# Patient Record
Sex: MALE | URBAN - METROPOLITAN AREA
[De-identification: names, ages, dates, MRNs, and addresses within clinical notes are randomized per-mention and may not be internally consistent; named-entity substitution may affect disease eponyms.]

---

## 2019-08-27 ENCOUNTER — APPOINTMENT (RX ONLY)
Dept: URBAN - METROPOLITAN AREA CLINIC 51 | Facility: CLINIC | Age: 59
Setting detail: DERMATOLOGY
End: 2019-08-27

## 2019-08-27 ENCOUNTER — APPOINTMENT (RX ONLY)
Dept: URBAN - METROPOLITAN AREA CLINIC 49 | Facility: CLINIC | Age: 59
Setting detail: DERMATOLOGY
End: 2019-08-27

## 2019-08-27 DIAGNOSIS — L72.0 EPIDERMAL CYST: ICD-10-CM

## 2019-08-27 DIAGNOSIS — Z02.9 ENCOUNTER FOR ADMINISTRATIVE EXAMINATIONS, UNSPECIFIED: ICD-10-CM

## 2019-08-27 DIAGNOSIS — L91.8 OTHER HYPERTROPHIC DISORDERS OF THE SKIN: ICD-10-CM

## 2019-08-27 PROBLEM — H91.90 UNSPECIFIED HEARING LOSS, UNSPECIFIED EAR: Status: ACTIVE | Noted: 2019-08-27

## 2019-08-27 PROCEDURE — ? SKIN TAG REMOVAL

## 2019-08-27 PROCEDURE — 11200 RMVL SKIN TAGS UP TO&INC 15: CPT

## 2019-08-27 PROCEDURE — 99202 OFFICE O/P NEW SF 15 MIN: CPT | Mod: 25

## 2019-08-27 PROCEDURE — ? COUNSELING

## 2019-08-27 ASSESSMENT — LOCATION DETAILED DESCRIPTION DERM
LOCATION DETAILED: RIGHT LATERAL UPPER BACK
LOCATION DETAILED: RIGHT INFERIOR MEDIAL MIDBACK
LOCATION DETAILED: RIGHT AXILLARY VAULT
LOCATION DETAILED: LEFT SUPERIOR UPPER BACK
LOCATION DETAILED: LEFT AXILLARY VAULT

## 2019-08-27 ASSESSMENT — LOCATION ZONE DERM
LOCATION ZONE: AXILLAE
LOCATION ZONE: TRUNK

## 2019-08-27 ASSESSMENT — LOCATION SIMPLE DESCRIPTION DERM
LOCATION SIMPLE: LEFT UPPER BACK
LOCATION SIMPLE: RIGHT LOWER BACK
LOCATION SIMPLE: RIGHT AXILLARY VAULT
LOCATION SIMPLE: LEFT AXILLARY VAULT
LOCATION SIMPLE: RIGHT UPPER BACK

## 2019-08-27 NOTE — PROCEDURE: SKIN TAG REMOVAL
Anesthesia Volume In Cc: 3
Include Z78.9 (Other Specified Conditions Influencing Health Status) As An Associated Diagnosis?: No
Anesthesia Type: 1% lidocaine with epinephrine
Medical Necessity Clause: This procedure was medically necessary because the lesions that were treated were: irritated.
Medical Necessity Information: It is in your best interest to select a reason for this procedure from the list below. All of these items fulfill various CMS LCD requirements except the new and changing color options.
Consent: Written consent obtained and the risks of skin tag removal was reviewed with the patient including but not limited to bleeding, pigmentary change, infection, pain, and remote possibility of scarring.
Add Associated Diagnoses If Applicable When Selecting Medical Necessity: Yes
Detail Level: Detailed

## 2019-11-18 ENCOUNTER — EVALUATION (OUTPATIENT)
Dept: PHYSICAL THERAPY | Facility: CLINIC | Age: 59
End: 2019-11-18
Payer: COMMERCIAL

## 2019-11-18 DIAGNOSIS — M76.822 POSTERIOR TIBIAL TENDINITIS OF LEFT LOWER EXTREMITY: Primary | ICD-10-CM

## 2019-11-18 PROCEDURE — 97162 PT EVAL MOD COMPLEX 30 MIN: CPT | Performed by: PHYSICAL THERAPIST

## 2019-11-18 NOTE — LETTER
2019    [address]  [address]    Patient: Penelope Ferrara   YOB: 1960   Date of Visit: 2019     Encounter Diagnosis     ICD-10-CM    1  Posterior tibial tendinitis of left lower extremity S93 894        Dear Dr Colton Cardenas: Thank you for your recent referral of Penelope Ferrara  Please review the attached evaluation summary from Davon's recent visit  Please verify that you agree with the plan of care by signing the attached order  If you have any questions or concerns, please do not hesitate to call  I sincerely appreciate the opportunity to share in the care of one of your patients and hope to have another opportunity to work with you in the near future  Sincerely,    Oleksandr Liu, PT      Referring Provider:      I certify that I have read the below Plan of Care and certify the need for these services furnished under this plan of treatment while under my care  EDITH [address]  [address]  [facility]          PT Evaluation     Today's date: 2019  Patient name: Penelope Ferrara  : 1960  MRN: [id number]  Referring provider: Anum Wallace DPM  Dx:   Encounter Diagnosis     ICD-10-CM    1  Posterior tibial tendinitis of left lower extremity M97 591                   Assessment  Assessment details: Patient is a 61 y o  male who presents with the above listed impairments  Patient would benefit from skilled PT services to address these impairments and to maximize function  Thank you for the referral     Impairments: abnormal or restricted ROM, abnormal movement, activity intolerance, impaired physical strength, pain with function and weight-bearing intolerance  Understanding of Dx/Px/POC: good   Prognosis: good    Goals  Impairment Goals  - Decrease pain by 50% in 2 weeks  - Increase left flexibility by 50% in 2 weeks    - Increase left lower extremity strength globally to 5/5 in 4 weeks  Functional Goals  - Return to Prior Level of Function in 4 weeks  - Patient will be independent with HEP in 4 weeks    Plan  Patient would benefit from: skilled PT  Planned modality interventions: cryotherapy  Planned therapy interventions: joint mobilization, manual therapy, neuromuscular re-education, patient education, strengthening, stretching, therapeutic activities, therapeutic exercise, home exercise program, functional ROM exercises, Sequeira taping and postural training  Frequency: 2x week (2-3x week)  Duration in weeks: 4  Treatment plan discussed with: patient, PTA and referring physician        Subjective Evaluation    History of Present Illness  Mechanism of injury: Patient reports for the last 3 years he started to have L foot pain  He states his pain has been off and on  He notes he did see a podiatrist who prescribed a medrol dose pack  He states his foot is starting to feel better  He has not been wearing a shoe, but rather a brace on his ankle  He notes his pain is worse when WB and with walking  He denies any tingling or numbness  Occupation: Unemployed  PLOF: Independent   Pt's Goals:  Pain  Current pain ratin  At best pain ratin  At worst pain ratin  Location: L foot  Quality: sharp    Patient Goals  Patient goals for therapy: decreased pain          Objective     Tenderness   Left Ankle/Foot   Tenderness in the posterior tibial tendon  Additional Tenderness Details  Very mild      Neurological Testing     Sensation     Ankle/Foot   Left Ankle/Foot   Intact: light touch    Right Ankle/Foot   Intact: light touch     Active Range of Motion   Left Ankle/Foot   Normal active range of motion  Dorsiflexion (ke): 10 degrees   Plantar flexion: WFL  Inversion: WFL  Eversion: WFL    Right Ankle/Foot   Dorsiflexion (ke): 18 degrees   Plantar flexion: WFL  Inversion: WFL  Eversion: WFL    Passive Range of Motion   Left Ankle/Foot  Normal passive range of motion    Right Ankle/Foot  Normal passive range of motion    Joint Play   Left Ankle/Foot  Hypomobile in the talocrural joint  Strength/Myotome Testing     Right Ankle/Foot   Normal strength    Additional Strength Details  5/5 strength noted grossly L ankle  Tests     Additional Tests Details  Mild to moderate calcaneal eversion noted upon WB   OTC orthotics recommended  General Comments: Ankle/Foot Comments   Neurovascular intact  Gait is WNL  Flowsheet Rows      Most Recent Value   PT/OT G-Codes   Current Score  69   Projected Score  79   FOTO information reviewed  Yes         Diagnosis: L post  Tib   Tendonitis    Precautions: -   Manuals 11/18       IASTM        TCJ mobs                        Exercise Diary        Bike                gastroc S        soleous S                Eccentric heel raises                                                                                                                                Modalities             CP PRN

## 2019-11-18 NOTE — PROGRESS NOTES
PT Evaluation     Today's date: 2019  Patient name: Perla Smith  : 1960  MRN: [id number]  Referring provider: Angella Aiken DPM  Dx:   Encounter Diagnosis     ICD-10-CM    1  Posterior tibial tendinitis of left lower extremity S28 209                   Assessment  Assessment details: Patient is a 61 y o  male who presents with the above listed impairments  Patient would benefit from skilled PT services to address these impairments and to maximize function  Thank you for the referral     Impairments: abnormal or restricted ROM, abnormal movement, activity intolerance, impaired physical strength, pain with function and weight-bearing intolerance  Understanding of Dx/Px/POC: good   Prognosis: good    Goals  Impairment Goals  - Decrease pain by 50% in 2 weeks  - Increase left flexibility by 50% in 2 weeks  - Increase left lower extremity strength globally to 5/5 in 4 weeks  Functional Goals  - Return to Prior Level of Function in 4 weeks  - Patient will be independent with HEP in 4 weeks    Plan  Patient would benefit from: skilled PT  Planned modality interventions: cryotherapy  Planned therapy interventions: joint mobilization, manual therapy, neuromuscular re-education, patient education, strengthening, stretching, therapeutic activities, therapeutic exercise, home exercise program, functional ROM exercises, Sequeira taping and postural training  Frequency: 2x week (2-3x week)  Duration in weeks: 4  Treatment plan discussed with: patient, PTA and referring physician        Subjective Evaluation    History of Present Illness  Mechanism of injury: Patient reports for the last 3 years he started to have L foot pain  He states his pain has been off and on  He notes he did see a podiatrist who prescribed a medrol dose pack  He states his foot is starting to feel better  He has not been wearing a shoe, but rather a brace on his ankle    He notes his pain is worse when WB and with walking  He denies any tingling or numbness  Occupation: Unemployed  PLOF: Independent   Pt's Goals:  Pain  Current pain ratin  At best pain ratin  At worst pain ratin  Location: L foot  Quality: sharp    Patient Goals  Patient goals for therapy: decreased pain          Objective     Tenderness   Left Ankle/Foot   Tenderness in the posterior tibial tendon  Additional Tenderness Details  Very mild  Neurological Testing     Sensation     Ankle/Foot   Left Ankle/Foot   Intact: light touch    Right Ankle/Foot   Intact: light touch     Active Range of Motion   Left Ankle/Foot   Normal active range of motion  Dorsiflexion (ke): 10 degrees   Plantar flexion: WFL  Inversion: WFL  Eversion: WFL    Right Ankle/Foot   Dorsiflexion (ke): 18 degrees   Plantar flexion: WFL  Inversion: WFL  Eversion: WFL    Passive Range of Motion   Left Ankle/Foot  Normal passive range of motion    Right Ankle/Foot  Normal passive range of motion    Joint Play   Left Ankle/Foot  Hypomobile in the talocrural joint  Strength/Myotome Testing     Right Ankle/Foot   Normal strength    Additional Strength Details  5/5 strength noted grossly L ankle  Tests     Additional Tests Details  Mild to moderate calcaneal eversion noted upon WB   OTC orthotics recommended  General Comments: Ankle/Foot Comments   Neurovascular intact  Gait is WNL  Flowsheet Rows      Most Recent Value   PT/OT G-Codes   Current Score  69   Projected Score  79   FOTO information reviewed  Yes         Diagnosis: L post  Tib   Tendonitis    Precautions: -   Manuals        IASTM        TCJ mobs                        Exercise Diary        Bike                gastroc S        soleous S                Eccentric heel raises                                                                                                                                Modalities             CP PRN

## 2019-11-20 ENCOUNTER — OFFICE VISIT (OUTPATIENT)
Dept: PHYSICAL THERAPY | Facility: CLINIC | Age: 59
End: 2019-11-20
Payer: COMMERCIAL

## 2019-11-20 DIAGNOSIS — M76.822 POSTERIOR TIBIAL TENDINITIS OF LEFT LOWER EXTREMITY: Primary | ICD-10-CM

## 2019-11-20 PROCEDURE — 97140 MANUAL THERAPY 1/> REGIONS: CPT

## 2019-11-20 PROCEDURE — 97112 NEUROMUSCULAR REEDUCATION: CPT

## 2019-11-25 ENCOUNTER — APPOINTMENT (OUTPATIENT)
Dept: PHYSICAL THERAPY | Facility: CLINIC | Age: 59
End: 2019-11-25
Payer: COMMERCIAL

## 2020-12-02 ENCOUNTER — TELEPHONE (OUTPATIENT)
Dept: GASTROENTEROLOGY | Facility: CLINIC | Age: 60
End: 2020-12-02

## 2020-12-03 ENCOUNTER — TELEPHONE (OUTPATIENT)
Dept: GASTROENTEROLOGY | Facility: AMBULARY SURGERY CENTER | Age: 60
End: 2020-12-03

## 2020-12-08 ENCOUNTER — TRANSCRIBE ORDERS (OUTPATIENT)
Dept: LAB | Facility: CLINIC | Age: 60
End: 2020-12-08

## 2020-12-08 ENCOUNTER — OFFICE VISIT (OUTPATIENT)
Dept: FAMILY MEDICINE CLINIC | Facility: CLINIC | Age: 60
End: 2020-12-08
Payer: COMMERCIAL

## 2020-12-08 VITALS
OXYGEN SATURATION: 97 % | DIASTOLIC BLOOD PRESSURE: 80 MMHG | SYSTOLIC BLOOD PRESSURE: 136 MMHG | WEIGHT: 224.6 LBS | HEART RATE: 82 BPM | HEIGHT: 69 IN | BODY MASS INDEX: 33.27 KG/M2 | RESPIRATION RATE: 18 BRPM

## 2020-12-08 DIAGNOSIS — R21 RASH ON SCROTUM: ICD-10-CM

## 2020-12-08 DIAGNOSIS — Z11.59 NEED FOR HEPATITIS C SCREENING TEST: ICD-10-CM

## 2020-12-08 DIAGNOSIS — Z12.5 SCREENING FOR PROSTATE CANCER: ICD-10-CM

## 2020-12-08 DIAGNOSIS — R35.1 NOCTURIA: ICD-10-CM

## 2020-12-08 DIAGNOSIS — Z13.1 SCREENING FOR DIABETES MELLITUS: ICD-10-CM

## 2020-12-08 DIAGNOSIS — Z13.6 SCREENING FOR CARDIOVASCULAR CONDITION: ICD-10-CM

## 2020-12-08 DIAGNOSIS — Z00.00 ANNUAL PHYSICAL EXAM: Primary | ICD-10-CM

## 2020-12-08 DIAGNOSIS — Z12.11 SCREENING FOR COLON CANCER: ICD-10-CM

## 2020-12-08 PROCEDURE — 99386 PREV VISIT NEW AGE 40-64: CPT | Performed by: NURSE PRACTITIONER

## 2020-12-08 PROCEDURE — 3725F SCREEN DEPRESSION PERFORMED: CPT | Performed by: NURSE PRACTITIONER

## 2020-12-08 PROCEDURE — 3008F BODY MASS INDEX DOCD: CPT | Performed by: NURSE PRACTITIONER

## 2020-12-09 ENCOUNTER — LAB (OUTPATIENT)
Dept: LAB | Facility: CLINIC | Age: 60
End: 2020-12-09
Payer: COMMERCIAL

## 2020-12-09 DIAGNOSIS — Z12.5 SCREENING FOR PROSTATE CANCER: ICD-10-CM

## 2020-12-09 DIAGNOSIS — Z11.59 NEED FOR HEPATITIS C SCREENING TEST: ICD-10-CM

## 2020-12-09 DIAGNOSIS — R35.1 NOCTURIA: ICD-10-CM

## 2020-12-09 DIAGNOSIS — Z13.6 SCREENING FOR CARDIOVASCULAR CONDITION: ICD-10-CM

## 2020-12-09 DIAGNOSIS — Z13.1 SCREENING FOR DIABETES MELLITUS: ICD-10-CM

## 2020-12-09 DIAGNOSIS — Z00.00 ANNUAL PHYSICAL EXAM: ICD-10-CM

## 2020-12-09 LAB
ALBUMIN SERPL BCP-MCNC: 4.1 G/DL (ref 3.5–5)
ALP SERPL-CCNC: 75 U/L (ref 46–116)
ALT SERPL W P-5'-P-CCNC: 34 U/L (ref 12–78)
ANION GAP SERPL CALCULATED.3IONS-SCNC: 3 MMOL/L (ref 4–13)
AST SERPL W P-5'-P-CCNC: 15 U/L (ref 5–45)
BASOPHILS # BLD AUTO: 0.08 THOUSANDS/ΜL (ref 0–0.1)
BASOPHILS NFR BLD AUTO: 1 % (ref 0–1)
BILIRUB SERPL-MCNC: 0.39 MG/DL (ref 0.2–1)
BUN SERPL-MCNC: 16 MG/DL (ref 5–25)
CALCIUM SERPL-MCNC: 9.6 MG/DL (ref 8.3–10.1)
CHLORIDE SERPL-SCNC: 107 MMOL/L (ref 100–108)
CHOLEST SERPL-MCNC: 235 MG/DL (ref 50–200)
CO2 SERPL-SCNC: 29 MMOL/L (ref 21–32)
CREAT SERPL-MCNC: 0.94 MG/DL (ref 0.6–1.3)
EOSINOPHIL # BLD AUTO: 0.52 THOUSAND/ΜL (ref 0–0.61)
EOSINOPHIL NFR BLD AUTO: 7 % (ref 0–6)
ERYTHROCYTE [DISTWIDTH] IN BLOOD BY AUTOMATED COUNT: 12 % (ref 11.6–15.1)
GFR SERPL CREATININE-BSD FRML MDRD: 88 ML/MIN/1.73SQ M
GLUCOSE P FAST SERPL-MCNC: 95 MG/DL (ref 65–99)
HCT VFR BLD AUTO: 42.5 % (ref 36.5–49.3)
HCV AB SER QL: NORMAL
HDLC SERPL-MCNC: 48 MG/DL
HGB BLD-MCNC: 13.6 G/DL (ref 12–17)
IMM GRANULOCYTES # BLD AUTO: 0.03 THOUSAND/UL (ref 0–0.2)
IMM GRANULOCYTES NFR BLD AUTO: 0 % (ref 0–2)
LDLC SERPL CALC-MCNC: 165 MG/DL (ref 0–100)
LYMPHOCYTES # BLD AUTO: 2.58 THOUSANDS/ΜL (ref 0.6–4.47)
LYMPHOCYTES NFR BLD AUTO: 35 % (ref 14–44)
MCH RBC QN AUTO: 28.9 PG (ref 26.8–34.3)
MCHC RBC AUTO-ENTMCNC: 32 G/DL (ref 31.4–37.4)
MCV RBC AUTO: 90 FL (ref 82–98)
MONOCYTES # BLD AUTO: 0.52 THOUSAND/ΜL (ref 0.17–1.22)
MONOCYTES NFR BLD AUTO: 7 % (ref 4–12)
NEUTROPHILS # BLD AUTO: 3.58 THOUSANDS/ΜL (ref 1.85–7.62)
NEUTS SEG NFR BLD AUTO: 50 % (ref 43–75)
NRBC BLD AUTO-RTO: 0 /100 WBCS
PLATELET # BLD AUTO: 208 THOUSANDS/UL (ref 149–390)
PMV BLD AUTO: 10.7 FL (ref 8.9–12.7)
POTASSIUM SERPL-SCNC: 4.7 MMOL/L (ref 3.5–5.3)
PROT SERPL-MCNC: 8.2 G/DL (ref 6.4–8.2)
PSA SERPL-MCNC: 4.5 NG/ML (ref 0–4)
RBC # BLD AUTO: 4.7 MILLION/UL (ref 3.88–5.62)
SODIUM SERPL-SCNC: 139 MMOL/L (ref 136–145)
TRIGL SERPL-MCNC: 112 MG/DL
TSH SERPL DL<=0.05 MIU/L-ACNC: 2.73 UIU/ML (ref 0.36–3.74)
WBC # BLD AUTO: 7.31 THOUSAND/UL (ref 4.31–10.16)

## 2020-12-09 PROCEDURE — 86803 HEPATITIS C AB TEST: CPT

## 2020-12-09 PROCEDURE — 84443 ASSAY THYROID STIM HORMONE: CPT

## 2020-12-09 PROCEDURE — G0103 PSA SCREENING: HCPCS

## 2020-12-09 PROCEDURE — 80061 LIPID PANEL: CPT

## 2020-12-09 PROCEDURE — 80053 COMPREHEN METABOLIC PANEL: CPT

## 2020-12-09 PROCEDURE — 85025 COMPLETE CBC W/AUTO DIFF WBC: CPT

## 2020-12-09 PROCEDURE — 36415 COLL VENOUS BLD VENIPUNCTURE: CPT

## 2020-12-22 ENCOUNTER — TELEPHONE (OUTPATIENT)
Dept: FAMILY MEDICINE CLINIC | Facility: CLINIC | Age: 60
End: 2020-12-22

## 2020-12-22 ENCOUNTER — TELEMEDICINE (OUTPATIENT)
Dept: FAMILY MEDICINE CLINIC | Facility: CLINIC | Age: 60
End: 2020-12-22
Payer: COMMERCIAL

## 2020-12-22 DIAGNOSIS — R97.20 ELEVATED PSA: ICD-10-CM

## 2020-12-22 DIAGNOSIS — E66.9 OBESITY (BMI 30-39.9): ICD-10-CM

## 2020-12-22 DIAGNOSIS — E78.5 HYPERLIPIDEMIA, UNSPECIFIED HYPERLIPIDEMIA TYPE: Primary | ICD-10-CM

## 2020-12-22 PROCEDURE — 1036F TOBACCO NON-USER: CPT | Performed by: NURSE PRACTITIONER

## 2020-12-22 PROCEDURE — 99213 OFFICE O/P EST LOW 20 MIN: CPT | Performed by: NURSE PRACTITIONER

## 2022-08-21 ENCOUNTER — HOSPITAL ENCOUNTER (EMERGENCY)
Facility: HOSPITAL | Age: 62
Discharge: HOME | End: 2022-08-21
Attending: EMERGENCY MEDICINE
Payer: COMMERCIAL

## 2022-08-21 VITALS
WEIGHT: 275.57 LBS | DIASTOLIC BLOOD PRESSURE: 70 MMHG | RESPIRATION RATE: 28 BRPM | OXYGEN SATURATION: 95 % | HEART RATE: 62 BPM | SYSTOLIC BLOOD PRESSURE: 118 MMHG | BODY MASS INDEX: 40.7 KG/M2 | TEMPERATURE: 97.9 F

## 2022-08-21 DIAGNOSIS — R33.9 URINARY RETENTION: ICD-10-CM

## 2022-08-21 DIAGNOSIS — T83.9XXA PROBLEM WITH FOLEY CATHETER, INITIAL ENCOUNTER (HCC): Primary | ICD-10-CM

## 2022-08-21 LAB
AMORPH URATE CRY URNS QL MICRO: ABNORMAL /HPF
ANION GAP SERPL CALCULATED.3IONS-SCNC: 12 MMOL/L (ref 4–13)
BACTERIA UR QL AUTO: ABNORMAL /HPF
BASOPHILS # BLD AUTO: 0.05 THOUSANDS/ΜL (ref 0–0.1)
BASOPHILS NFR BLD AUTO: 1 % (ref 0–1)
BILIRUB UR QL STRIP: NEGATIVE
BUN SERPL-MCNC: 19 MG/DL (ref 5–25)
CALCIUM SERPL-MCNC: 9.1 MG/DL (ref 8.3–10.1)
CHLORIDE SERPL-SCNC: 99 MMOL/L (ref 96–108)
CLARITY UR: ABNORMAL
CO2 SERPL-SCNC: 23 MMOL/L (ref 21–32)
COLOR UR: ABNORMAL
CREAT SERPL-MCNC: 1.29 MG/DL (ref 0.6–1.3)
EOSINOPHIL # BLD AUTO: 0.1 THOUSAND/ΜL (ref 0–0.61)
EOSINOPHIL NFR BLD AUTO: 2 % (ref 0–6)
ERYTHROCYTE [DISTWIDTH] IN BLOOD BY AUTOMATED COUNT: 13.2 % (ref 11.6–15.1)
GFR SERPL CREATININE-BSD FRML MDRD: 59 ML/MIN/1.73SQ M
GLUCOSE SERPL-MCNC: 100 MG/DL (ref 65–140)
GLUCOSE UR STRIP-MCNC: NEGATIVE MG/DL
HCT VFR BLD AUTO: 39.7 % (ref 36.5–49.3)
HGB BLD-MCNC: 13.1 G/DL (ref 12–17)
HGB UR QL STRIP.AUTO: ABNORMAL
IMM GRANULOCYTES # BLD AUTO: 0.04 THOUSAND/UL (ref 0–0.2)
IMM GRANULOCYTES NFR BLD AUTO: 1 % (ref 0–2)
KETONES UR STRIP-MCNC: NEGATIVE MG/DL
LEUKOCYTE ESTERASE UR QL STRIP: ABNORMAL
LYMPHOCYTES # BLD AUTO: 1.06 THOUSANDS/ΜL (ref 0.6–4.47)
LYMPHOCYTES NFR BLD AUTO: 17 % (ref 14–44)
MCH RBC QN AUTO: 28.5 PG (ref 26.8–34.3)
MCHC RBC AUTO-ENTMCNC: 33 G/DL (ref 31.4–37.4)
MCV RBC AUTO: 86 FL (ref 82–98)
MONOCYTES # BLD AUTO: 0.44 THOUSAND/ΜL (ref 0.17–1.22)
MONOCYTES NFR BLD AUTO: 7 % (ref 4–12)
MUCOUS THREADS UR QL AUTO: ABNORMAL
NEUTROPHILS # BLD AUTO: 4.72 THOUSANDS/ΜL (ref 1.85–7.62)
NEUTS SEG NFR BLD AUTO: 72 % (ref 43–75)
NITRITE UR QL STRIP: NEGATIVE
NON-SQ EPI CELLS URNS QL MICRO: ABNORMAL /HPF
NRBC BLD AUTO-RTO: 0 /100 WBCS
OTHER STN SPEC: ABNORMAL
PH UR STRIP.AUTO: 6 [PH]
PLATELET # BLD AUTO: 195 THOUSANDS/UL (ref 149–390)
PMV BLD AUTO: 10.5 FL (ref 8.9–12.7)
POTASSIUM SERPL-SCNC: 4.2 MMOL/L (ref 3.5–5.3)
PROT UR STRIP-MCNC: ABNORMAL MG/DL
RBC # BLD AUTO: 4.6 MILLION/UL (ref 3.88–5.62)
RBC #/AREA URNS AUTO: ABNORMAL /HPF
SODIUM SERPL-SCNC: 134 MMOL/L (ref 135–147)
SP GR UR STRIP.AUTO: >=1.03 (ref 1–1.03)
UROBILINOGEN UR QL STRIP.AUTO: 0.2 E.U./DL
WBC # BLD AUTO: 6.41 THOUSAND/UL (ref 4.31–10.16)
WBC #/AREA URNS AUTO: ABNORMAL /HPF

## 2022-08-21 PROCEDURE — 99283 EMERGENCY DEPT VISIT LOW MDM: CPT

## 2022-08-21 PROCEDURE — 85025 COMPLETE CBC W/AUTO DIFF WBC: CPT | Performed by: EMERGENCY MEDICINE

## 2022-08-21 PROCEDURE — 81001 URINALYSIS AUTO W/SCOPE: CPT | Performed by: EMERGENCY MEDICINE

## 2022-08-21 PROCEDURE — 96374 THER/PROPH/DIAG INJ IV PUSH: CPT

## 2022-08-21 PROCEDURE — 36415 COLL VENOUS BLD VENIPUNCTURE: CPT | Performed by: EMERGENCY MEDICINE

## 2022-08-21 PROCEDURE — 99285 EMERGENCY DEPT VISIT HI MDM: CPT | Performed by: EMERGENCY MEDICINE

## 2022-08-21 PROCEDURE — 80048 BASIC METABOLIC PNL TOTAL CA: CPT | Performed by: EMERGENCY MEDICINE

## 2022-08-21 RX ORDER — WATER 1000 ML/1000ML
INJECTION, SOLUTION INTRAVENOUS
Status: DISCONTINUED
Start: 2022-08-21 | End: 2022-08-21 | Stop reason: HOSPADM

## 2022-08-21 RX ORDER — TAMSULOSIN HYDROCHLORIDE 0.4 MG/1
0.4 CAPSULE ORAL
COMMUNITY
End: 2022-09-07 | Stop reason: SDUPTHER

## 2022-08-21 RX ORDER — HYDROCODONE BITARTRATE AND ACETAMINOPHEN 5; 325 MG/1; MG/1
1 TABLET ORAL EVERY 6 HOURS PRN
Qty: 8 TABLET | Refills: 0 | Status: SHIPPED | OUTPATIENT
Start: 2022-08-21 | End: 2022-08-23

## 2022-08-21 RX ORDER — HYDROMORPHONE HCL/PF 1 MG/ML
1 SYRINGE (ML) INJECTION ONCE
Status: COMPLETED | OUTPATIENT
Start: 2022-08-21 | End: 2022-08-21

## 2022-08-21 RX ADMIN — HYDROMORPHONE HYDROCHLORIDE 1 MG: 1 INJECTION, SOLUTION INTRAMUSCULAR; INTRAVENOUS; SUBCUTANEOUS at 17:00

## 2022-08-21 NOTE — ED PROVIDER NOTES
History  Chief Complaint   Patient presents with    Urinary Catheter Problem     Catheter placed on  four days ago in [place], given referral here for Dr Gisela Oneill, started having prob with catheter recently urinating around catheter     27-year-old male presents with the urine leaking around his Daniels catheter and severe supra pubic abdominal discomfort  He has an indwelling Daniels catheter placed a few days ago at an outside hospital for urine retention and hematuria  Says since 5:00 a m  This morning he has not been draining any urine into the Daniels bag  Denies any nausea vomiting dysuria urgency frequency no fevers or chills no other symptoms at this time  Due to see Dr Gisela Oneill the urologist in a couple of days      History provided by:  Patient   used: No        Prior to Admission Medications   Prescriptions Last Dose Informant Patient Reported? Taking? Sulfamethoxazole-Trimethoprim (BACTRIM DS PO) 8/21/2022 at Unknown time  Yes Yes   Sig: Take by mouth 2 (two) times a day   tamsulosin (FLOMAX) 0 4 mg 8/21/2022 at Unknown time  Yes Yes   Sig: Take 0 4 mg by mouth daily with dinner      Facility-Administered Medications: None       Past Medical History:   Diagnosis Date    Sleep apnea        Past Surgical History:   Procedure Laterality Date    CYST REMOVAL      UVULECTOMY         Family History   Problem Relation Age of Onset    Dementia Mother     Prostate cancer Father      I have reviewed and agree with the history as documented      E-Cigarette/Vaping    E-Cigarette Use Current Every Day User      E-Cigarette/Vaping Substances    Nicotine No     THC No     CBD No     Flavoring No     Other No     Unknown No      Social History     Tobacco Use    Smoking status: Former Smoker    Smokeless tobacco: Never Used   Vaping Use    Vaping Use: Every day   Substance Use Topics    Alcohol use: Yes     Comment: occ    Drug use: Yes     Types: Marijuana     Comment: occ Review of Systems   Constitutional: Negative  HENT: Negative  Eyes: Negative  Respiratory: Negative  Cardiovascular: Negative  Gastrointestinal: Positive for abdominal pain  Endocrine: Negative  Genitourinary: Positive for difficulty urinating  Musculoskeletal: Negative  Skin: Negative  Allergic/Immunologic: Negative  Neurological: Negative  Hematological: Negative  Psychiatric/Behavioral: Negative  All other systems reviewed and are negative  Physical Exam  Physical Exam  Constitutional:       Appearance: Normal appearance  HENT:      Head: Normocephalic and atraumatic  Nose: Nose normal       Mouth/Throat:      Mouth: Mucous membranes are moist    Eyes:      Extraocular Movements: Extraocular movements intact  Pupils: Pupils are equal, round, and reactive to light  Cardiovascular:      Rate and Rhythm: Normal rate and regular rhythm  Pulmonary:      Effort: Pulmonary effort is normal       Breath sounds: Normal breath sounds  Abdominal:      General: Abdomen is flat  Bowel sounds are normal       Palpations: Abdomen is soft  Tenderness: There is abdominal tenderness  Musculoskeletal:         General: Normal range of motion  Cervical back: Normal range of motion and neck supple  Skin:     General: Skin is warm  Capillary Refill: Capillary refill takes less than 2 seconds  Neurological:      General: No focal deficit present  Mental Status: He is alert and oriented to person, place, and time  Mental status is at baseline  Psychiatric:         Mood and Affect: Mood normal          Thought Content:  Thought content normal          Vital Signs  ED Triage Vitals [08/21/22 1620]   Temperature Pulse Respirations Blood Pressure SpO2   97 9 °F (36 6 °C) 76 (!) 28 144/87 94 %      Temp Source Heart Rate Source Patient Position - Orthostatic VS BP Location FiO2 (%)   Tympanic Monitor Sitting Left arm --      Pain Score       8 Vitals:    08/21/22 1620 08/21/22 1753   BP: 144/87 134/74   Pulse: 76 62   Patient Position - Orthostatic VS: Sitting          Visual Acuity      ED Medications  Medications   sterile water injection **ADS Override Pull** (has no administration in time range)   HYDROmorphone (DILAUDID) injection 1 mg (1 mg Intravenous Given 8/21/22 1700)       Diagnostic Studies  Results Reviewed     Procedure Component Value Units Date/Time    Urine Microscopic [[id number]]  (Abnormal) Collected: 08/21/22 1715    Lab Status: Final result Specimen: Urine, Indwelling Daniels Catheter Updated: 08/21/22 1735     RBC, UA 30-50 /hpf      WBC, UA 4-10 /hpf      Epithelial Cells None Seen /hpf      Bacteria, UA Occasional /hpf      AMORPH URATES Moderate /hpf      OTHER OBSERVATIONS Renal Tubule Epithelial Cells Present     MUCUS THREADS Occasional    UA (URINE) with reflex to Scope [[id number]]  (Abnormal) Collected: 08/21/22 1715    Lab Status: Final result Specimen: Urine, Indwelling Daniels Catheter Updated: 08/21/22 1725     Color, UA Gini     Clarity, UA Slightly Cloudy     Specific Gravity, UA >=1 030     pH, UA 6 0     Leukocytes, UA Small     Nitrite, UA Negative     Protein, UA Trace mg/dl      Glucose, UA Negative mg/dl      Ketones, UA Negative mg/dl      Urobilinogen, UA 0 2 E U /dl      Bilirubin, UA Negative     Occult Blood, UA Large    Basic metabolic panel [[id number]]  (Abnormal) Collected: 08/21/22 1705    Lab Status: Final result Specimen: Blood from Arm, Left Updated: 08/21/22 1722     Sodium 134 mmol/L      Potassium 4 2 mmol/L      Chloride 99 mmol/L      CO2 23 mmol/L      ANION GAP 12 mmol/L      BUN 19 mg/dL      Creatinine 1 29 mg/dL      Glucose 100 mg/dL      Calcium 9 1 mg/dL      eGFR 59 ml/min/1 73sq m     Narrative:      [place] guidelines for Chronic Kidney Disease (CKD):     Stage 1 with normal or high GFR (GFR > 90 mL/min/1 73 square meters)    Stage 2 Mild CKD (GFR = 60-89 mL/min/1 73 square meters)    Stage 3A Moderate CKD (GFR = 45-59 mL/min/1 73 square meters)    Stage 3B Moderate CKD (GFR = 30-44 mL/min/1 73 square meters)    Stage 4 Severe CKD (GFR = 15-29 mL/min/1 73 square meters)    Stage 5 End Stage CKD (GFR <15 mL/min/1 73 square meters)  Note: GFR calculation is accurate only with a steady state creatinine    CBC and differential [[id number]] Collected: 08/21/22 1705    Lab Status: Final result Specimen: Blood from Arm, Left Updated: 08/21/22 1712     WBC 6 41 Thousand/uL      RBC 4 60 Million/uL      Hemoglobin 13 1 g/dL      Hematocrit 39 7 %      MCV 86 fL      MCH 28 5 pg      MCHC 33 0 g/dL      RDW 13 2 %      MPV 10 5 fL      Platelets 279 Thousands/uL      nRBC 0 /100 WBCs      Neutrophils Relative 72 %      Immat GRANS % 1 %      Lymphocytes Relative 17 %      Monocytes Relative 7 %      Eosinophils Relative 2 %      Basophils Relative 1 %      Neutrophils Absolute 4 72 Thousands/µL      Immature Grans Absolute 0 04 Thousand/uL      Lymphocytes Absolute 1 06 Thousands/µL      Monocytes Absolute 0 44 Thousand/µL      Eosinophils Absolute 0 10 Thousand/µL      Basophils Absolute 0 05 Thousands/µL                  No orders to display              Procedures  Procedures         ED Course                                             MDM  Number of Diagnoses or Management Options     Amount and/or Complexity of Data Reviewed  Clinical lab tests: ordered and reviewed  Tests in the medicine section of CPT®: ordered and reviewed    Patient Progress  Patient progress: stable      Disposition  Final diagnoses:   Problem with Daniels catheter, initial encounter [facility])   Urinary retention     Time reflects when diagnosis was documented in both MDM as applicable and the Disposition within this note     Time User Action Codes Description Comment    8/21/2022  5:57 PM Massimo Merida Add [T83  9XXA] Problem with Daniels catheter, initial encounter ([facility] [address] )     8/21/2022  5:57 PM Anepu Sana Frost Add [R33 9] Urinary retention       ED Disposition     ED Disposition   Discharge    Condition   Stable    Date/Time   Sun Aug 21, 2022  5:57 PM    [address] discharge to home/self care  Follow-up Information     Follow up With Specialties Details Why Contact Info Additional [address], [address], Nurse Practitioner Schedule an appointment as soon as possible for a visit   [address] [facility]  [facility] [phone]       [address] [place] [address] Emergency Department Emergency Medicine  If symptoms worsen [address] [address] Emergency Department, [place], [place], [address] [facility], MD Urology   [address] [facility]  [phone]             Patient's Medications   Discharge Prescriptions    HYDROCODONE-ACETAMINOPHEN (NORCO) 5-325 MG PER TABLET    Take 1 tablet by mouth every 6 (six) hours as needed for pain for up to 2 days Max Daily Amount: 4 tablets       Start Date: 8/21/2022 End Date: 8/23/2022       Order Dose: 1 tablet       Quantity: 8 tablet    Refills: 0       No discharge procedures on file      PDMP Review     None          ED Provider  Electronically Signed by           Shala Montelongo DO  08/21/22 5193

## 2022-08-21 NOTE — ED NOTES
Placed pt in room removed pants and given cover, will not let go of catheter, if I let go it is going to come out 3 inches took catheter out of stat loc himself  Pushed over bladder  States not too much pressure there as he isnt drinking too much  States Dr wants a urine specimen   Some urine in leg bag, pt states this is from much earlier     [facility]  08/21/22 [id number]

## 2022-08-24 ENCOUNTER — HOSPITAL ENCOUNTER (EMERGENCY)
Facility: HOSPITAL | Age: 62
Discharge: HOME | End: 2022-08-24
Attending: EMERGENCY MEDICINE
Payer: COMMERCIAL

## 2022-08-24 ENCOUNTER — APPOINTMENT (EMERGENCY)
Dept: RADIOLOGY | Facility: HOSPITAL | Age: 62
End: 2022-08-24
Payer: COMMERCIAL

## 2022-08-24 VITALS
OXYGEN SATURATION: 93 % | TEMPERATURE: 97.2 F | RESPIRATION RATE: 20 BRPM | SYSTOLIC BLOOD PRESSURE: 100 MMHG | DIASTOLIC BLOOD PRESSURE: 60 MMHG | HEART RATE: 67 BPM

## 2022-08-24 DIAGNOSIS — R33.9 URINARY RETENTION: ICD-10-CM

## 2022-08-24 DIAGNOSIS — R10.2 PELVIC PAIN: Primary | ICD-10-CM

## 2022-08-24 LAB
ANION GAP SERPL CALCULATED.3IONS-SCNC: 13 MMOL/L (ref 4–13)
BACTERIA UR QL AUTO: ABNORMAL /HPF
BASOPHILS # BLD AUTO: 0.06 THOUSANDS/ΜL (ref 0–0.1)
BASOPHILS NFR BLD AUTO: 1 % (ref 0–1)
BILIRUB UR QL STRIP: NEGATIVE
BUN SERPL-MCNC: 19 MG/DL (ref 5–25)
CALCIUM SERPL-MCNC: 8.3 MG/DL (ref 8.3–10.1)
CHLORIDE SERPL-SCNC: 96 MMOL/L (ref 96–108)
CLARITY UR: CLEAR
CO2 SERPL-SCNC: 23 MMOL/L (ref 21–32)
COLOR UR: ABNORMAL
CREAT SERPL-MCNC: 1.31 MG/DL (ref 0.6–1.3)
EOSINOPHIL # BLD AUTO: 0.31 THOUSAND/ΜL (ref 0–0.61)
EOSINOPHIL NFR BLD AUTO: 4 % (ref 0–6)
ERYTHROCYTE [DISTWIDTH] IN BLOOD BY AUTOMATED COUNT: 13.4 % (ref 11.6–15.1)
GFR SERPL CREATININE-BSD FRML MDRD: 58 ML/MIN/1.73SQ M
GLUCOSE SERPL-MCNC: 83 MG/DL (ref 65–140)
GLUCOSE UR STRIP-MCNC: NEGATIVE MG/DL
HCT VFR BLD AUTO: 37.6 % (ref 36.5–49.3)
HGB BLD-MCNC: 12.3 G/DL (ref 12–17)
HGB UR QL STRIP.AUTO: ABNORMAL
IMM GRANULOCYTES # BLD AUTO: 0.05 THOUSAND/UL (ref 0–0.2)
IMM GRANULOCYTES NFR BLD AUTO: 1 % (ref 0–2)
KETONES UR STRIP-MCNC: NEGATIVE MG/DL
LEUKOCYTE ESTERASE UR QL STRIP: NEGATIVE
LYMPHOCYTES # BLD AUTO: 1.6 THOUSANDS/ΜL (ref 0.6–4.47)
LYMPHOCYTES NFR BLD AUTO: 23 % (ref 14–44)
MCH RBC QN AUTO: 28.7 PG (ref 26.8–34.3)
MCHC RBC AUTO-ENTMCNC: 32.7 G/DL (ref 31.4–37.4)
MCV RBC AUTO: 88 FL (ref 82–98)
MONOCYTES # BLD AUTO: 0.61 THOUSAND/ΜL (ref 0.17–1.22)
MONOCYTES NFR BLD AUTO: 9 % (ref 4–12)
NEUTROPHILS # BLD AUTO: 4.34 THOUSANDS/ΜL (ref 1.85–7.62)
NEUTS SEG NFR BLD AUTO: 62 % (ref 43–75)
NITRITE UR QL STRIP: NEGATIVE
NON-SQ EPI CELLS URNS QL MICRO: ABNORMAL /HPF
NRBC BLD AUTO-RTO: 0 /100 WBCS
PH UR STRIP.AUTO: 5.5 [PH]
PLATELET # BLD AUTO: 212 THOUSANDS/UL (ref 149–390)
PMV BLD AUTO: 10 FL (ref 8.9–12.7)
POTASSIUM SERPL-SCNC: 3.8 MMOL/L (ref 3.5–5.3)
PROT UR STRIP-MCNC: NEGATIVE MG/DL
RBC # BLD AUTO: 4.28 MILLION/UL (ref 3.88–5.62)
RBC #/AREA URNS AUTO: ABNORMAL /HPF
SODIUM SERPL-SCNC: 132 MMOL/L (ref 135–147)
SP GR UR STRIP.AUTO: 1.01 (ref 1–1.03)
URATE CRY URNS QL MICRO: ABNORMAL /HPF
UROBILINOGEN UR QL STRIP.AUTO: 0.2 E.U./DL
WBC # BLD AUTO: 6.97 THOUSAND/UL (ref 4.31–10.16)
WBC #/AREA URNS AUTO: ABNORMAL /HPF

## 2022-08-24 PROCEDURE — 85025 COMPLETE CBC W/AUTO DIFF WBC: CPT | Performed by: EMERGENCY MEDICINE

## 2022-08-24 PROCEDURE — 96374 THER/PROPH/DIAG INJ IV PUSH: CPT

## 2022-08-24 PROCEDURE — 80048 BASIC METABOLIC PNL TOTAL CA: CPT | Performed by: EMERGENCY MEDICINE

## 2022-08-24 PROCEDURE — 81001 URINALYSIS AUTO W/SCOPE: CPT | Performed by: EMERGENCY MEDICINE

## 2022-08-24 PROCEDURE — 36415 COLL VENOUS BLD VENIPUNCTURE: CPT | Performed by: EMERGENCY MEDICINE

## 2022-08-24 PROCEDURE — 99285 EMERGENCY DEPT VISIT HI MDM: CPT | Performed by: EMERGENCY MEDICINE

## 2022-08-24 PROCEDURE — 74178 CT ABD&PLV WO CNTR FLWD CNTR: CPT

## 2022-08-24 PROCEDURE — G1004 CDSM NDSC: HCPCS

## 2022-08-24 PROCEDURE — 99284 EMERGENCY DEPT VISIT MOD MDM: CPT

## 2022-08-24 RX ORDER — HYDROMORPHONE HCL/PF 1 MG/ML
0.5 SYRINGE (ML) INJECTION ONCE
Status: COMPLETED | OUTPATIENT
Start: 2022-08-24 | End: 2022-08-24

## 2022-08-24 RX ORDER — OXYCODONE HYDROCHLORIDE AND ACETAMINOPHEN 5; 325 MG/1; MG/1
1 TABLET ORAL EVERY 4 HOURS PRN
Qty: 5 TABLET | Refills: 0 | Status: SHIPPED | OUTPATIENT
Start: 2022-08-24 | End: 2022-09-03

## 2022-08-24 RX ORDER — TAMSULOSIN HYDROCHLORIDE 0.4 MG/1
0.4 CAPSULE ORAL
Qty: 7 CAPSULE | Refills: 0 | Status: SHIPPED | OUTPATIENT
Start: 2022-08-24 | End: 2022-09-07 | Stop reason: SDUPTHER

## 2022-08-24 RX ORDER — LIDOCAINE HYDROCHLORIDE 20 MG/ML
1 JELLY TOPICAL ONCE
Status: COMPLETED | OUTPATIENT
Start: 2022-08-24 | End: 2022-08-24

## 2022-08-24 RX ORDER — LIDOCAINE HYDROCHLORIDE 20 MG/ML
JELLY TOPICAL AS NEEDED
Qty: 30 ML | Refills: 0 | Status: SHIPPED | OUTPATIENT
Start: 2022-08-24

## 2022-08-24 RX ADMIN — LIDOCAINE HYDROCHLORIDE 1 APPLICATION: 20 JELLY TOPICAL at 16:10

## 2022-08-24 RX ADMIN — HYDROMORPHONE HYDROCHLORIDE 0.5 MG: 1 INJECTION, SOLUTION INTRAMUSCULAR; INTRAVENOUS; SUBCUTANEOUS at 15:47

## 2022-08-24 RX ADMIN — IOHEXOL 65 ML: 350 INJECTION, SOLUTION INTRAVENOUS at 16:26

## 2022-08-24 NOTE — ED NOTES
[facility] patient of results       Hitesh Sanchez RN  08/24/22 0815

## 2022-08-24 NOTE — ED NOTES
Hand irrigated cox as per verbal order of Dr Heidy Watters  No clots noted only sediments  Patient will be discharged with cox catheter in place  Offered to change standard drainage bag with a leg bag but patient refuse  Expressed that he is more comfortable with the standard drainage bag because it doesn't fill too quickly       Radha Jordan RN  08/24/22 6618

## 2022-08-24 NOTE — ED PROVIDER NOTES
History  Chief Complaint   Patient presents with    Urinary Retention     Patient states had a cox catheter removed around 1000 this morning - states has been unable to urinate since and has the urge to go  Sent by Dr Remy Bernard  HPI  Patient is a 35-year-old male presenting for evaluation of urinary retention  Patient initially had Cox catheter placed on 08/17 while out of state in [place], initially had 3 way catheter placed for irrigation hematuria and was discharged home with this  Patient came to this emergency department on 08/21 for leakage around catheter and had Cox changed  Patient's Cox was removed today in outpatient urology office  Patient states that there was some draining sediment at that time, denies hematuria or passage of clots  Patient has not been able to urinate all day today  Patient states that he is having significant suprapubic pain, denies fevers, chills, flank pain, nausea, vomiting  Prior to Admission Medications   Prescriptions Last Dose Informant Patient Reported? Taking? HYDROcodone-acetaminophen (Norco) 5-325 mg per tablet   No No   Sig: Take 1 tablet by mouth every 6 (six) hours as needed for pain for up to 2 days Max Daily Amount: 4 tablets   Sulfamethoxazole-Trimethoprim (BACTRIM DS PO)   Yes No   Sig: Take by mouth 2 (two) times a day   tamsulosin (FLOMAX) 0 4 mg   Yes No   Sig: Take 0 4 mg by mouth daily with dinner      Facility-Administered Medications: None       Past Medical History:   Diagnosis Date    Sleep apnea        Past Surgical History:   Procedure Laterality Date    CYST REMOVAL      UVULECTOMY         Family History   Problem Relation Age of Onset    Dementia Mother     Prostate cancer Father      I have reviewed and agree with the history as documented      E-Cigarette/Vaping    E-Cigarette Use Current Every Day User      E-Cigarette/Vaping Substances    Nicotine No     THC No     CBD No     Flavoring No     Other No     Unknown No Social History     Tobacco Use    Smoking status: Former Smoker    Smokeless tobacco: Never Used   Vaping Use    Vaping Use: Every day   Substance Use Topics    Alcohol use: Yes     Comment: occ    Drug use: Yes     Types: Marijuana     Comment: occ       Review of Systems   Constitutional: Negative for chills, fatigue and fever  HENT: Negative for congestion, rhinorrhea and sore throat  Eyes: Negative for photophobia and visual disturbance  Respiratory: Negative for chest tightness and shortness of breath  Cardiovascular: Negative for chest pain, palpitations and leg swelling  Gastrointestinal: Negative for abdominal distention, abdominal pain, diarrhea, nausea and vomiting  Endocrine: Negative for polydipsia and polyuria  Genitourinary: Positive for decreased urine volume and difficulty urinating  Negative for dysuria, enuresis, flank pain, frequency, genital sores and hematuria  Musculoskeletal: Negative for arthralgias and myalgias  Skin: Negative for color change, pallor, rash and wound  Neurological: Negative for weakness, numbness and headaches  Psychiatric/Behavioral: Negative for confusion  Physical Exam  Physical Exam  Vitals and nursing note reviewed  Constitutional:       General: He is not in acute distress  Appearance: He is well-developed  He is not diaphoretic  Comments: Uncomfortable appearing but nondistressed   HENT:      Head: Normocephalic and atraumatic  Right Ear: External ear normal       Left Ear: External ear normal       Nose: Nose normal       Mouth/Throat:      Pharynx: No oropharyngeal exudate  Eyes:      Conjunctiva/sclera: Conjunctivae normal       Pupils: Pupils are equal, round, and reactive to light  Cardiovascular:      Rate and Rhythm: Normal rate and regular rhythm  Heart sounds: Normal heart sounds  No murmur heard  No friction rub  No gallop  Comments: Regular rate and rhythm, no murmurs rubs or gallops  Extremities warm and well perfused  Pulmonary:      Effort: Pulmonary effort is normal  No respiratory distress  Breath sounds: Normal breath sounds  No wheezing  Comments: No increased work of breathing  Speaking complete sentences  Satting 95% on room air indicating adequate oxygenation  Chest:      Chest wall: No tenderness  Abdominal:      General: Bowel sounds are normal  There is no distension  Palpations: Abdomen is soft  There is no mass  Tenderness: There is no abdominal tenderness  There is no guarding or rebound  Comments: Suprapubic fullness and tenderness  No CVA tenderness  Musculoskeletal:         General: No deformity  Skin:     General: Skin is warm and dry  Capillary Refill: Capillary refill takes less than 2 seconds  Neurological:      Mental Status: He is alert and oriented to person, place, and time     Psychiatric:         Behavior: Behavior normal          Vital Signs  ED Triage Vitals [08/24/22 1524]   Temperature Pulse Respirations Blood Pressure SpO2   99 °F (37 2 °C) 72 22 130/82 95 %      Temp Source Heart Rate Source Patient Position - Orthostatic VS BP Location FiO2 (%)   Tympanic Monitor Sitting Right arm --      Pain Score       6           Vitals:    08/24/22 1524 08/24/22 1702   BP: 130/82 100/60   Pulse: 72 67   Patient Position - Orthostatic VS: Sitting          Visual Acuity      ED Medications  Medications   HYDROmorphone (DILAUDID) injection 0 5 mg (0 5 mg Intravenous Given 8/24/22 1547)   lidocaine (URO-JET) 2 % urethral/mucosal gel 1 application (1 application Urethral Given 8/24/22 1610)   iohexol (OMNIPAQUE) 350 MG/ML injection (MULTI-DOSE) 65 mL (65 mL Intravenous Given 8/24/22 1626)       Diagnostic Studies  Results Reviewed     Procedure Component Value Units Date/Time    Urinalysis with microscopic [[id number]]  (Abnormal) Collected: 08/24/22 1619    Lab Status: Final result Specimen: Urine, Indwelling Daniels Catheter Updated: 08/24/22 1658     Color, UA Light Yellow     Clarity, UA Clear     Specific Gravity, UA 1 010     pH, UA 5 5     Leukocytes, UA Negative     Nitrite, UA Negative     Protein, UA Negative mg/dl      Glucose, UA Negative mg/dl      Ketones, UA Negative mg/dl      Urobilinogen, UA 0 2 E U /dl      Bilirubin, UA Negative     Occult Blood, UA Large     RBC, UA 10-20 /hpf      WBC, UA 0-1 /hpf      Epithelial Cells None Seen /hpf      Bacteria, UA None Seen /hpf      Uric Acid Laura, UA Occasional /hpf     Basic metabolic panel [[id number]]  (Abnormal) Collected: 08/24/22 1548    Lab Status: Final result Specimen: Blood from Arm, Right Updated: 08/24/22 1605     Sodium 132 mmol/L      Potassium 3 8 mmol/L      Chloride 96 mmol/L      CO2 23 mmol/L      ANION GAP 13 mmol/L      BUN 19 mg/dL      Creatinine 1 31 mg/dL      Glucose 83 mg/dL      Calcium 8 3 mg/dL      eGFR 58 ml/min/1 73sq m     Narrative:      [place] guidelines for Chronic Kidney Disease (CKD):     Stage 1 with normal or high GFR (GFR > 90 mL/min/1 73 square meters)    Stage 2 Mild CKD (GFR = 60-89 mL/min/1 73 square meters)    Stage 3A Moderate CKD (GFR = 45-59 mL/min/1 73 square meters)    Stage 3B Moderate CKD (GFR = 30-44 mL/min/1 73 square meters)    Stage 4 Severe CKD (GFR = 15-29 mL/min/1 73 square meters)    Stage 5 End Stage CKD (GFR <15 mL/min/1 73 square meters)  Note: GFR calculation is accurate only with a steady state creatinine    CBC and differential [[id number]] Collected: 08/24/22 1548    Lab Status: Final result Specimen: Blood from Arm, Right Updated: 08/24/22 1555     WBC 6 97 Thousand/uL      RBC 4 28 Million/uL      Hemoglobin 12 3 g/dL      Hematocrit 37 6 %      MCV 88 fL      MCH 28 7 pg      MCHC 32 7 g/dL      RDW 13 4 %      MPV 10 0 fL      Platelets 422 Thousands/uL      nRBC 0 /100 WBCs      Neutrophils Relative 62 %      Immat GRANS % 1 %      Lymphocytes Relative 23 %      Monocytes Relative 9 %      Eosinophils Relative 4 %      Basophils Relative 1 %      Neutrophils Absolute 4 34 Thousands/µL      Immature Grans Absolute 0 05 Thousand/uL      Lymphocytes Absolute 1 60 Thousands/µL      Monocytes Absolute 0 61 Thousand/µL      Eosinophils Absolute 0 31 Thousand/µL      Basophils Absolute 0 06 Thousands/µL                  CT renal protocol   Final Result by Alie Logan MD (08/24 1700)      Severe prostamegaly protruding into the bladder base  A Cox catheter is present within the bladder lumen  Mild circumferential bladder wall thickening likely on the basis of outlet obstruction  No hydronephrosis  Workstation performed: [id number]                    Procedures  Procedures         ED Course                               SBIRT 22yo+    Flowsheet Row Most Recent Value   SBIRT (25 yo +)    In order to provide better care to our patients, we are screening all of our patients for alcohol and drug use  Would it be okay to ask you these screening questions? Unable to answer at this time Filed at: 08/24/2022 1531                    MDM  Number of Diagnoses or Management Options  Pelvic pain  Urinary retention  Diagnosis management comments: Urinary retention shortly following Cox removal   Roughly 700 cc of urine on bladder scan  Plan for lab evaluation including CBC, CMP, analgesia, CT urogram per urology request, replacing of Cox with urinalysis  Patient stating improvement of symptoms following cox placement  CT demonstrating severe prostatomegaly and bladder wall thickening  Patient without significant blood noted in Cox bag  Continue good urine output  Provided with prescription for analgesia, plan for Urology follow-up the next week for additional attempt of Cox removal   Dr Leanna Shaw with Urology agreeable with this plan  Discharged with verbal and written return precautions        Disposition  Final diagnoses:   Pelvic pain   Urinary retention     Time reflects when diagnosis was documented in both MDM as applicable and the Disposition within this note     Time User Action Codes Description Comment    8/24/2022  5:12 PM Brandee Shipley Add [R10 2] Pelvic pain     8/24/2022  5:12 PM Brandee Shipley Add [R33 9] Urinary retention       ED Disposition     ED Disposition   Discharge    Condition   Stable    Date/Time   Wed Aug 24, 2022  5:16 PM    Comment   Yudi Nevarez discharge to home/self care  Follow-up Information     Follow up With Specialties Details Why Contact Info    Jonathan Collins MD Urology   [address]  [phone]            Discharge Medication List as of 8/24/2022  5:16 PM      START taking these medications    Details   oxyCODONE-acetaminophen (Percocet) 5-325 mg per tablet Take 1 tablet by mouth every 4 (four) hours as needed for moderate pain for up to 10 days Max Daily Amount: 6 tablets, Starting Wed 8/24/2022, Until Sat 9/3/2022 at 2359, Normal      !! tamsulosin (FLOMAX) 0 4 mg Take 1 capsule (0 4 mg total) by mouth daily with dinner, Starting Wed 8/24/2022, Normal       !! - Potential duplicate medications found  Please discuss with provider  CONTINUE these medications which have NOT CHANGED    Details   Sulfamethoxazole-Trimethoprim (BACTRIM DS PO) Take by mouth 2 (two) times a day, Historical Med      !! tamsulosin (FLOMAX) 0 4 mg Take 0 4 mg by mouth daily with dinner, Historical Med       !! - Potential duplicate medications found  Please discuss with provider  STOP taking these medications       HYDROcodone-acetaminophen (Norco) 5-325 mg per tablet Comments:   Reason for Stopping:               No discharge procedures on file      PDMP Review     None          ED Provider  Electronically Signed by           Aracely Holcomb MD  08/25/22 1070

## 2022-08-24 NOTE — DISCHARGE INSTRUCTIONS
Based on your CT scan it looks like you are retaining urine due to your large prostate  We have given you an additional prescription of Flomax  Take 2 of the 0 4 mg tablets daily  Keep your Daniels in place  Follow-up with urology next week  Use the provided Percocet as needed for pain control  You can use the provided Uro jet for pain around the tip of the penis  Return to the emergency department if symptoms worsen

## 2022-08-30 ENCOUNTER — TELEPHONE (OUTPATIENT)
Dept: UROLOGY | Facility: CLINIC | Age: 62
End: 2022-08-30

## 2022-08-30 NOTE — TELEPHONE ENCOUNTER
Patient walked in today discuss setting up appointments  He is a previous patient at Dr Anjel Crump office and would like to come to [facility]  He would like to see Dr Alec Thomas to discuss a possibly cystoscopy procedure and to discuss treatment of care  He was in the ER on 8/24 after leaving Dr Anjel Crump office after getting his catheter removed  He does not want to end up in the ER again and would like an appointment ASAP for his catheter  He has had the catheter in for 3 weeks now  Patient is tentatively scheduled in [facility] for 10/6 with Dr Alec Thomas  I told patient I would forward this to clinical and see about any possibility of sooner appointments  Patient signed a release form and I will try to get records from Dr Anjel Crump office

## 2022-08-30 NOTE — TELEPHONE ENCOUNTER
When patient came in on 8/30, he filled out a release form to get records from Dr Mihir Cooper office  I faxed the release to [phone]  Will keep checking to see if we get records

## 2022-09-06 PROBLEM — Z71.2 ENCOUNTER TO DISCUSS TEST RESULTS: Status: ACTIVE | Noted: 2022-09-06

## 2022-09-06 PROBLEM — N40.1 URINARY RETENTION DUE TO BENIGN PROSTATIC HYPERPLASIA: Status: ACTIVE | Noted: 2022-09-06

## 2022-09-06 PROBLEM — R33.8 URINARY RETENTION DUE TO BENIGN PROSTATIC HYPERPLASIA: Status: ACTIVE | Noted: 2022-09-06

## 2022-09-06 PROBLEM — Z97.8 FOLEY CATHETER IN PLACE: Status: ACTIVE | Noted: 2022-09-06

## 2022-09-06 NOTE — PROGRESS NOTES
Problem List Items Addressed This Visit        Cardiovascular and Mediastinum    Angiokeratoma of scrotum     Patient noted to have angiokeratomas of scrotum, these are not bother him at this time, these can be treated with laser therapy in the future if necessary            Genitourinary    Urinary retention due to benign prostatic hyperplasia     Has now had catheter dependent urinary retention a number of times, the precipitating event is drinking alcohol, currently has failed a trial of void with his current catheter  He is amenable to Stevenson of finasteride which I have started  Prostate measures 107 75 grams, no median lobe  I recommend a holmium laser enucleation of prostate  We will arrange for consultation with my colleague, Dr Green Blizzard  He can have a trial of void in 2 weeks, if this fails this can be repeated at 4 week intervals with maximal medical therapy  He will ultimately continue to require surgery at some point         Relevant Medications    finasteride (PROSCAR) 5 mg tablet    Other Relevant Orders    PSA Total, Diagnostic    Cytology, urine    Gross hematuria     No concerning findings on CT renal protocol, he does have an enlarged prostate, hematuria likely due to his large and vascular prostate  A urine cytology has been sent            Other    Nocturia     The patient has the sound of obstructive tissue within the throat upon talking, I suspect that he may have some occult sleep apnea based on this  He also has quite a large prostate which can contribute to nocturia         Relevant Orders    Cytology, urine    Elevated PSA - Primary     Status post negative prostate biopsy in the past, PSA was 4 5  PSA density is low  I have ordered a repeat PSA, this will likely be elevated given the Daniels catheter in place  Relevant Orders    PSA Total, Diagnostic    Cytology, urine    Daniels catheter in place     He does not like his Daniels catheter    I spoke with him about clean intermittent catheterization, he is not sure that he would be able to do this         Relevant Orders    Cytology, urine    Encounter to discuss test results     All findings reviewed with the patient, we discussed the pre, oscar, postoperative care for simple prostatectomy as well as holmium laser enucleation of prostate as well as transurethral resection of prostate  He has abdominal mesh in place, simple prostatectomy is not ideal given this information  I recommend a holmium laser enucleation of prostate         Relevant Orders    Cytology, urine              Assessment and plan:       Please see problem oriented charting for the assessment plan of today's urological complaints      Ernestine Zambrano MD      Chief Complaint     Chief Complaint   Patient presents with    Cystoscopy         History of Present Illness     Luisa Koch is a 64 y o  gentleman with chief complaints as above  Has been catheter dependent urinary tension  He hates his catheter  He is interested in holmium laser enucleation of prostate  This is reasonable based on his findings  Status post negative prostate biopsy in the past, PSA was 4 5, prostate volume 107 75 grams, no median lobe  No malignant findings on cystoscopy today      The following portions of the patient's history were reviewed and updated as appropriate: allergies, current medications, past family history, past medical history, past social history, past surgical history and problem list     Detailed Urologic History     - please refer to HPI    Review of Systems     Review of Systems          Allergies     Allergies   Allergen Reactions    Keflex [Cephalexin]        Physical Exam     Physical Exam        Vital Signs  Vitals:    09/07/22 0856   BP: 116/76   Pulse: 68   SpO2: 95%   Weight: 120 kg (265 lb)   Height: 5' 9" (1 753 m)         Current Medications       Current Outpatient Medications:     finasteride (PROSCAR) 5 mg tablet, Take 1 tablet (5 mg total) by mouth daily, Disp: 90 tablet, Rfl: 3    lidocaine (XYLOCAINE) 2 % topical gel, Apply topically as needed for mild pain, Disp: 30 mL, Rfl: 0    tamsulosin (FLOMAX) 0 4 mg, Take 1 capsule (0 4 mg total) by mouth daily with dinner, Disp: 7 capsule, Rfl: 0    Sulfamethoxazole-Trimethoprim (BACTRIM DS PO), Take by mouth 2 (two) times a day (Patient not taking: Reported on 9/7/2022), Disp: , Rfl:       Active Problems     Patient Active Problem List   Diagnosis    Annual physical exam    Nocturia    Screening for prostate cancer    Screening for colon cancer    Screening for cardiovascular condition    Screening for diabetes mellitus    Need for hepatitis C screening test    Hyperlipidemia    Elevated PSA    Obesity (BMI 30-39  9)    Urinary retention due to benign prostatic hyperplasia    Daniels catheter in place    Encounter to discuss test results    Gross hematuria    Angiokeratoma of scrotum         Past Medical History     Past Medical History:   Diagnosis Date    Sleep apnea          Surgical History     Past Surgical History:   Procedure Laterality Date    APPENDECTOMY      CYST REMOVAL      HERNIA REPAIR      UVULECTOMY           Family History     Family History   Problem Relation Age of Onset    Dementia Mother     Prostate cancer Father          Social History     Social History     Social History     Tobacco Use   Smoking Status Current Some Day Smoker    Types: Cigarettes   Smokeless Tobacco Never Used   Vaporizer device      Pertinent Lab Values     Lab Results   Component Value Date    CREATININE 1 31 (H) 08/24/2022       Lab Results   Component Value Date    PSA 4 5 (H) 12/09/2020             Pertinent Imaging      Review of imaging performed, no concerning lesions within the kidneys or upper tracts    Enlarged prostate is noted

## 2022-09-06 NOTE — TELEPHONE ENCOUNTER
Patient walked into [facility] office and was scheduled in cancellation spot tomorrow

## 2022-09-07 ENCOUNTER — TELEPHONE (OUTPATIENT)
Dept: UROLOGY | Facility: CLINIC | Age: 62
End: 2022-09-07

## 2022-09-07 ENCOUNTER — OFFICE VISIT (OUTPATIENT)
Dept: UROLOGY | Facility: CLINIC | Age: 62
End: 2022-09-07
Payer: COMMERCIAL

## 2022-09-07 VITALS
SYSTOLIC BLOOD PRESSURE: 116 MMHG | DIASTOLIC BLOOD PRESSURE: 76 MMHG | WEIGHT: 265 LBS | HEIGHT: 69 IN | OXYGEN SATURATION: 95 % | BODY MASS INDEX: 39.25 KG/M2 | HEART RATE: 68 BPM

## 2022-09-07 DIAGNOSIS — D29.4 ANGIOKERATOMA OF SCROTUM: ICD-10-CM

## 2022-09-07 DIAGNOSIS — Z97.8 FOLEY CATHETER IN PLACE: ICD-10-CM

## 2022-09-07 DIAGNOSIS — R33.8 URINARY RETENTION DUE TO BENIGN PROSTATIC HYPERPLASIA: ICD-10-CM

## 2022-09-07 DIAGNOSIS — Z71.2 ENCOUNTER TO DISCUSS TEST RESULTS: ICD-10-CM

## 2022-09-07 DIAGNOSIS — R33.9 URINARY RETENTION: ICD-10-CM

## 2022-09-07 DIAGNOSIS — R35.1 NOCTURIA: ICD-10-CM

## 2022-09-07 DIAGNOSIS — R31.0 GROSS HEMATURIA: ICD-10-CM

## 2022-09-07 DIAGNOSIS — R97.20 ELEVATED PSA: Primary | ICD-10-CM

## 2022-09-07 DIAGNOSIS — N40.1 URINARY RETENTION DUE TO BENIGN PROSTATIC HYPERPLASIA: ICD-10-CM

## 2022-09-07 PROCEDURE — 88112 CYTOPATH CELL ENHANCE TECH: CPT | Performed by: PATHOLOGY

## 2022-09-07 PROCEDURE — 76872 US TRANSRECTAL: CPT | Performed by: UROLOGY

## 2022-09-07 PROCEDURE — 52000 CYSTOURETHROSCOPY: CPT | Performed by: UROLOGY

## 2022-09-07 PROCEDURE — 99215 OFFICE O/P EST HI 40 MIN: CPT | Performed by: UROLOGY

## 2022-09-07 RX ORDER — FINASTERIDE 5 MG/1
5 TABLET, FILM COATED ORAL DAILY
Qty: 90 TABLET | Refills: 3 | Status: SHIPPED | OUTPATIENT
Start: 2022-09-07 | End: 2022-12-06

## 2022-09-07 RX ORDER — TAMSULOSIN HYDROCHLORIDE 0.4 MG/1
0.4 CAPSULE ORAL
Qty: 90 CAPSULE | Refills: 3 | Status: SHIPPED | OUTPATIENT
Start: 2022-09-07 | End: 2022-12-06

## 2022-09-07 NOTE — ASSESSMENT & PLAN NOTE
Patient noted to have angiokeratomas of scrotum, these are not bother him at this time, these can be treated with laser therapy in the future if necessary

## 2022-09-07 NOTE — TELEPHONE ENCOUNTER
Patient needs to be seen by Dr Griffin Harper ASAP within the next 2 weeks and I cannot find anything at [facility] for him  Any suggestions? Provider note:    Return for ASAP with Dr Griffin Harper please for HOLEP discussion, TOV 2 weeks

## 2022-09-07 NOTE — ASSESSMENT & PLAN NOTE
All findings reviewed with the patient, we discussed the pre, oscar, postoperative care for simple prostatectomy as well as holmium laser enucleation of prostate as well as transurethral resection of prostate  He has abdominal mesh in place, simple prostatectomy is not ideal given this information    I recommend a holmium laser enucleation of prostate

## 2022-09-07 NOTE — ASSESSMENT & PLAN NOTE
No concerning findings on CT renal protocol, he does have an enlarged prostate, hematuria likely due to his large and vascular prostate    A urine cytology has been sent

## 2022-09-07 NOTE — PROGRESS NOTES
Office Cystoscopy Procedure Note    Indication:    Hematuria    Informed consent   The risks, benefits, complications, treatment options, and expected outcomes were discussed with the patient  The patient concurred with the proposed plan and provided informed consent  Anesthesia  Lidocaine jelly 2%    Antibiotic prophylaxis   None    Procedure  The patient was placed in the supineposition, was prepped and draped in the usual manner using sterile technique, and 2% lidocaine jelly instilled into the urethra  A 17 F flexible cystoscope was then inserted into the urethra and the urethra and bladder carefully examined  The following findings were noted:    Findings:  Urethra:  Normal  Prostate:  Enlarged, vascular, protrudes into the bladder base, no median lobe  Bladder:  Some catheter edema, no lesions, tumors, defects, or stones  Ureteral orifices:  Orthotopic  Other findings:  None, retroflexed view confirms    Specimens: None                 Complications:    None; patient tolerated the procedure well           Disposition: To home           Condition: Stable    Plan: High-grade bladder outlet obstruction due to large and vascular prostate  This is the likely culprit for gross hematuria, no malignant findings       Cystoscopy     Date/Time 9/7/2022 9:27 AM     Performed by  Alycia Mares MD     Authorized by Alycia Mares MD      Universal Protocol:  Consent: Verbal consent obtained  Written consent obtained    Risks and benefits: risks, benefits and alternatives were discussed  Consent given by: patient  Patient understanding: patient states understanding of the procedure being performed  Patient consent: the patient's understanding of the procedure matches consent given  Procedure consent: procedure consent matches procedure scheduled  Relevant documents: relevant documents present and verified  Test results: test results available and properly labeled  Site marked: the operative site was not marked  Radiology Images displayed and confirmed  If images not available, report reviewed: imaging studies available  Required items: required blood products, implants, devices, and special equipment available  Patient identity confirmed: verbally with patient and provided demographic data        Procedure Details:  Procedure type: cystoscopy    Patient tolerance: Patient tolerated the procedure well with no immediate complications    Additional Procedure Details: Office Cystoscopy Procedure Note    Indication:    Hematuria    Informed consent   The risks, benefits, complications, treatment options, and expected outcomes were discussed with the patient  The patient concurred with the proposed plan and provided informed consent  Anesthesia  Lidocaine jelly 2%    Antibiotic prophylaxis   None    Procedure  The patient was placed in the supineposition, was prepped and draped in the usual manner using sterile technique, and 2% lidocaine jelly instilled into the urethra  A 17 F flexible cystoscope was then inserted into the urethra and the urethra and bladder carefully examined  The following findings were noted:    Findings:  Urethra:  Normal  Prostate:  Enlarged, vascular, protrudes into the bladder base, no median lobe  Bladder:  Some catheter edema, no lesions, tumors, defects, or stones  Ureteral orifices:  Orthotopic  Other findings:  None, retroflexed view confirms    Specimens: None                 Complications:    None; patient tolerated the procedure well           Disposition: To home           Condition: Stable    Plan: High-grade bladder outlet obstruction due to large and vascular prostate    This is the likely culprit for gross hematuria, no malignant findings

## 2022-09-07 NOTE — ASSESSMENT & PLAN NOTE
Has now had catheter dependent urinary retention a number of times, the precipitating event is drinking alcohol, currently has failed a trial of void with his current catheter  He is amenable to [place] of finasteride which I have started  Prostate measures 107 75 grams, no median lobe  I recommend a holmium laser enucleation of prostate  We will arrange for consultation with my colleague, Dr Alize Aldana  He can have a trial of void in 2 weeks, if this fails this can be repeated at 4 week intervals with maximal medical therapy    He will ultimately continue to require surgery at some point

## 2022-09-07 NOTE — ASSESSMENT & PLAN NOTE
The patient has the sound of obstructive tissue within the throat upon talking, I suspect that he may have some occult sleep apnea based on this    He also has quite a large prostate which can contribute to nocturia

## 2022-09-07 NOTE — ASSESSMENT & PLAN NOTE
He does not like his Daniels catheter    I spoke with him about clean intermittent catheterization, he is not sure that he would be able to do this

## 2022-09-07 NOTE — ASSESSMENT & PLAN NOTE
Status post negative prostate biopsy in the past, PSA was 4 5  PSA density is low  I have ordered a repeat PSA, this will likely be elevated given the Daniels catheter in place

## 2022-09-07 NOTE — PROGRESS NOTES
Office transrectal ultrasound    Indication    Urinary retention    Informed consent   The risks, benefits and alternatives to TRUS discussed with patient, informed consent obtained  Transrectal ultrasonography  The patient was placed in the left lateral decubitus position  After an attentive digital rectal examination, a 7 5 mHz sidefire ultrasound probe was gently inserted into the rectum and biplanar imaging of the prostate was done with the findings noted below  Images were taken of any abnormal findings and also to document prostate size  Bladder  The bladder base appeared normal     Prostate  Digital rectal exam findings:  - enlarged    Ultrasound size measurements:  -Volume:  107 75 cm3    Ultrasound findings:  -Cysts: None  -Masses: None  -Median lobe: Absent                  Complications  There were no procedural complications  Disposition  The patient was dismissed to home     Post-procedure instructions: Today he underwent an uncomplicated transrectal ultrasound   showing a 107 75 gram gland, the patient is interested in holmium laser enucleation of prostate andthis is reasonable based on these findings          Biopsy prostate     Date/Time 9/7/2022 9:30 AM     Performed by  Ken Donahue MD     Authorized by Ken Donahue MD      Universal Protocol   Consent: Verbal consent obtained  Written consent obtained  Risks and benefits: risks, benefits and alternatives were discussed  Consent given by: patient  Patient understanding: patient states understanding of the procedure being performed  Patient consent: the patient's understanding of the procedure matches consent given  Procedure consent: procedure consent matches procedure scheduled  Relevant documents: relevant documents present and verified  Test results: test results available and properly labeled  Site marked: the operative site was not marked  Radiology Images displayed and confirmed   If images not available, report reviewed: imaging studies available  Required items: required blood products, implants, devices, and special equipment available  Patient identity confirmed: verbally with patient and provided demographic data        Local anesthesia used: no     Anesthesia   Local anesthesia used: no     Sedation   Patient sedated: no        Specimen: no    Culture: no   Procedure Details   Procedure Notes: Office transrectal ultrasound    Indication    Urinary retention    Informed consent   The risks, benefits and alternatives to TRUS discussed with patient, informed consent obtained  Transrectal ultrasonography  The patient was placed in the left lateral decubitus position  After an attentive digital rectal examination, a 7 5 mHz sidefire ultrasound probe was gently inserted into the rectum and biplanar imaging of the prostate was done with the findings noted below  Images were taken of any abnormal findings and also to document prostate size  Bladder  The bladder base appeared normal     Prostate  Digital rectal exam findings:  - enlarged    Ultrasound size measurements:  -Volume:  107 75 cm3    Ultrasound findings:  -Cysts: None  -Masses: None  -Median lobe: Absent                  Complications  There were no procedural complications  Disposition  The patient was dismissed to home     Post-procedure instructions: Today he underwent an uncomplicated transrectal ultrasound     showing a 107 75 gram gland, the patient is interested in holmium laser enucleation of prostate andthis is reasonable based on these findings  Patient Transportation: confirmed  Patient tolerance: patient tolerated the procedure well with no immediate complications

## 2022-09-07 NOTE — TELEPHONE ENCOUNTER
Patient scheduled for 2 week void trial on 9/22/22 at 830am and 230pm in the [place] office with RN  Soonest discussion with Dr Green Blizzard scheduled for 9/30/22 at 830am in the [facility] office  Please confirm all appts and locations

## 2022-09-22 ENCOUNTER — PROCEDURE VISIT (OUTPATIENT)
Dept: UROLOGY | Facility: CLINIC | Age: 62
End: 2022-09-22
Payer: COMMERCIAL

## 2022-09-22 VITALS
SYSTOLIC BLOOD PRESSURE: 148 MMHG | WEIGHT: 262 LBS | DIASTOLIC BLOOD PRESSURE: 82 MMHG | RESPIRATION RATE: 18 BRPM | HEIGHT: 69 IN | BODY MASS INDEX: 38.8 KG/M2

## 2022-09-22 DIAGNOSIS — R33.8 URINARY RETENTION DUE TO BENIGN PROSTATIC HYPERPLASIA: Primary | ICD-10-CM

## 2022-09-22 DIAGNOSIS — N40.1 URINARY RETENTION DUE TO BENIGN PROSTATIC HYPERPLASIA: Primary | ICD-10-CM

## 2022-09-22 LAB — POST-VOID RESIDUAL VOLUME, ML POC: 450 ML

## 2022-09-22 PROCEDURE — 51702 INSERT TEMP BLADDER CATH: CPT

## 2022-09-22 PROCEDURE — 51798 US URINE CAPACITY MEASURE: CPT

## 2022-09-22 NOTE — PROGRESS NOTES
9/22/2022    Joesph Stringer  1960  [id number]    Diagnosis  Chief Complaint     Urinary Retention; Elevated PSA          Patient presents for cox removal and void trial managed by our office    Plan  Patient will keep catheter in place until upcoming appt with Dr Aidan Fraga next week on 9/30/22  Procedure Cox removal/voiding trial    Cox catheter removed after deflation of an intact balloon  Patient tolerated well  Encouraged patient to hydrate well and return this afternoon for post void residual   he knows he may return early if uncomfortable and unable to urinate  Patient agrees to this plan        Vitals:    09/22/22 0833   BP: 148/82   Resp: 18   Weight: 119 kg (262 lb)   Height: 5' 9" (1 753 m)           Neisha Riley RN BSN

## 2022-09-22 NOTE — PROGRESS NOTES
9/22/2022  Amarilis Angel is a 64 y o  male  [id number]    Diagnosis:  Chief Complaint     Urinary Retention; Elevated PSA          Patient presents for follow up post void residual s/p Daniels removal earlier today managed by our office    Plan:    folow up as scheduled      Assessment:      Vitals:    09/22/22 0833   BP: 148/82   Resp: 18   Weight: 119 kg (262 lb)   Height: 5' 9" (1 753 m)           Patient was not able to void in the office  He was uncomfortable and states he only urinated about 4 oz at home throughout the day  Post void residual measured via bladder scanner to be 450 mL  Discussed with Dr Melecio Melendez, who recommends Daniels catheter be placed  Recent Results (from the past 6 hour(s))   POCT Measure PVR    Collection Time: 09/22/22  2:24 PM   Result Value Ref Range    POST-VOID RESIDUAL VOLUME, ML  mL     Universal Protocol:  Consent: Verbal consent obtained  Risks and benefits: risks, benefits and alternatives were discussed  Consent given by: patient  Patient understanding: patient states understanding of the procedure being performed  Patient identity confirmed: verbally with patient      Bladder catheterization    Date/Time: 9/22/2022 2:27 PM  Performed by: Tor Lauren RN  Authorized by: Khari Link MD     Patient location:  Bedside  Consent:     Consent given by:  Patient  Universal protocol:     Procedure explained and questions answered to patient or proxy's satisfaction: yes      Patient identity confirmed:  Verbally with patient  Pre-procedure details:     Procedure purpose:  Therapeutic    Preparation: Patient was prepped and draped in usual sterile fashion    Anesthesia (see MAR for exact dosages):      Anesthesia method:  None  Procedure details:     Bladder irrigation: no      Catheter insertion:  Indwelling    Approach: natural orifice      Catheter type:  Coude, Daniels and latex    Catheter size:  16 Fr    Number of attempts:  1    Successful placement: yes Urine characteristics:  Clear and yellow  Post-procedure details:     Patient tolerance of procedure: Tolerated well, no immediate complications  Comments:      After bladder was drained of 450 mL yellow urine, it was attached to leg bag  Patient provided with extra leg bags and stat locks  He will maintain catheter until at least his upcoming appt with Dr Oanh Jimenez  Patient knows to call the office with any questions or concerns          Андрей Sweeney RN,BSN

## 2022-09-28 NOTE — TELEPHONE ENCOUNTER
After numerous attempts to fax release, we called the office and e-mailed the request to them  They received the request and will send records

## 2022-09-30 ENCOUNTER — OFFICE VISIT (OUTPATIENT)
Dept: UROLOGY | Facility: AMBULATORY SURGERY CENTER | Age: 62
End: 2022-09-30
Payer: COMMERCIAL

## 2022-09-30 ENCOUNTER — APPOINTMENT (OUTPATIENT)
Dept: LAB | Facility: CLINIC | Age: 62
End: 2022-09-30
Payer: COMMERCIAL

## 2022-09-30 VITALS
DIASTOLIC BLOOD PRESSURE: 68 MMHG | HEIGHT: 69 IN | BODY MASS INDEX: 37.47 KG/M2 | SYSTOLIC BLOOD PRESSURE: 118 MMHG | WEIGHT: 253 LBS | HEART RATE: 74 BPM | OXYGEN SATURATION: 96 %

## 2022-09-30 DIAGNOSIS — N32.89 BLADDER SPASMS: ICD-10-CM

## 2022-09-30 DIAGNOSIS — R97.20 ELEVATED PSA: ICD-10-CM

## 2022-09-30 DIAGNOSIS — N40.1 URINARY RETENTION DUE TO BENIGN PROSTATIC HYPERPLASIA: Primary | ICD-10-CM

## 2022-09-30 DIAGNOSIS — N40.1 URINARY RETENTION DUE TO BENIGN PROSTATIC HYPERPLASIA: ICD-10-CM

## 2022-09-30 DIAGNOSIS — R33.8 URINARY RETENTION DUE TO BENIGN PROSTATIC HYPERPLASIA: Primary | ICD-10-CM

## 2022-09-30 DIAGNOSIS — R33.8 URINARY RETENTION DUE TO BENIGN PROSTATIC HYPERPLASIA: ICD-10-CM

## 2022-09-30 LAB
BACTERIA UR QL AUTO: ABNORMAL /HPF
BILIRUB UR QL STRIP: NEGATIVE
CLARITY UR: ABNORMAL
COLOR UR: COLORLESS
GLUCOSE UR STRIP-MCNC: NEGATIVE MG/DL
HGB UR QL STRIP.AUTO: NEGATIVE
KETONES UR STRIP-MCNC: NEGATIVE MG/DL
LEUKOCYTE ESTERASE UR QL STRIP: ABNORMAL
NITRITE UR QL STRIP: NEGATIVE
NON-SQ EPI CELLS URNS QL MICRO: ABNORMAL /HPF
PH UR STRIP.AUTO: 6 [PH]
PROT UR STRIP-MCNC: NEGATIVE MG/DL
PSA SERPL-MCNC: 5.6 NG/ML (ref 0–4)
RBC #/AREA URNS AUTO: ABNORMAL /HPF
SP GR UR STRIP.AUTO: 1 (ref 1–1.03)
UROBILINOGEN UR STRIP-ACNC: <2 MG/DL
WBC #/AREA URNS AUTO: ABNORMAL /HPF

## 2022-09-30 PROCEDURE — 87181 SC STD AGAR DILUTION PER AGT: CPT | Performed by: UROLOGY

## 2022-09-30 PROCEDURE — 81001 URINALYSIS AUTO W/SCOPE: CPT | Performed by: UROLOGY

## 2022-09-30 PROCEDURE — 87077 CULTURE AEROBIC IDENTIFY: CPT | Performed by: UROLOGY

## 2022-09-30 PROCEDURE — 87086 URINE CULTURE/COLONY COUNT: CPT | Performed by: UROLOGY

## 2022-09-30 PROCEDURE — 84153 ASSAY OF PSA TOTAL: CPT

## 2022-09-30 PROCEDURE — 99215 OFFICE O/P EST HI 40 MIN: CPT | Performed by: UROLOGY

## 2022-09-30 PROCEDURE — 87186 SC STD MICRODIL/AGAR DIL: CPT | Performed by: UROLOGY

## 2022-09-30 RX ORDER — OXYBUTYNIN CHLORIDE 15 MG/1
15 TABLET, EXTENDED RELEASE ORAL
Qty: 30 TABLET | Refills: 1 | Status: SHIPPED | OUTPATIENT
Start: 2022-09-30 | End: 2022-10-30

## 2022-09-30 RX ORDER — LEVOFLOXACIN 5 MG/ML
750 INJECTION, SOLUTION INTRAVENOUS ONCE
OUTPATIENT
Start: 2022-09-30 | End: 2022-09-30

## 2022-09-30 RX ORDER — ACETAMINOPHEN 325 MG/1
975 TABLET ORAL ONCE
OUTPATIENT
Start: 2022-09-30 | End: 2022-09-30

## 2022-09-30 NOTE — ASSESSMENT & PLAN NOTE
Patient with persistent catheter dependent urinary retention with a very large prostate gland despite medical therapy  We discussed options for procedural intervention  Given the size of his gland these center on robotic simple prostatectomy versus HoLEP surgery versus prostate artery embolization  Discussed the risks and benefits of each  Robotic simple prostatectomy offers excellent functional outcomes requires entry into the abdomen and catheter for 2 weeks while the bladder is healing with cystogram to follow and has risks of bleeding urine leak bowel injury and usually temporary incontinence  HoLEP surgery requires special equipment and an experienced urologist and has risks of bleeding and fluid absorption and almost guaranteed incontinence for a few weeks to months  Prostate artery embolization is performed by Interventional Radiology through a minimally invasive approach through femoral vessels  This is a relatively new technology that has not been studied in a wide population but limited results show good efficacy but will take time for them to result as the prostate shrinks over time  We discussed these procedures will likely improve obstructive symptoms and may or may not improve irritative symptoms such as frequency and urgency  The patient wants move forward with HoLEP  We need to evaluate his PSA further before we can commit to surgery consent was obtained today after discussing risks

## 2022-09-30 NOTE — PROGRESS NOTES
Assessment/Plan:    Urinary retention due to benign prostatic hyperplasia  Patient with persistent catheter dependent urinary retention with a very large prostate gland despite medical therapy  We discussed options for procedural intervention  Given the size of his gland these center on robotic simple prostatectomy versus HoLEP surgery versus prostate artery embolization  Discussed the risks and benefits of each  Robotic simple prostatectomy offers excellent functional outcomes requires entry into the abdomen and catheter for 2 weeks while the bladder is healing with cystogram to follow and has risks of bleeding urine leak bowel injury and usually temporary incontinence  HoLEP surgery requires special equipment and an experienced urologist and has risks of bleeding and fluid absorption and almost guaranteed incontinence for a few weeks to months  Prostate artery embolization is performed by Interventional Radiology through a minimally invasive approach through femoral vessels  This is a relatively new technology that has not been studied in a wide population but limited results show good efficacy but will take time for them to result as the prostate shrinks over time  We discussed these procedures will likely improve obstructive symptoms and may or may not improve irritative symptoms such as frequency and urgency  The patient wants move forward with HoLEP  We need to evaluate his PSA further before we can commit to surgery consent was obtained today after discussing risks  Elevated PSA  Pt with neg biopsy in the past and last PSA 4 5 which is low density but with family hx of prostate cancer  Plan to repeat PSA since it has been 2 years  If high (which it may be from cox) have to consider MRI or biopsy prior to outlet surgery    Bladder spasms  Cox seems to be draining so discomfort and leakage around is likely bladder spasms   Will try ditropan XL      Subjective:      Patient ID: Abby Gonzalez Blayne Boland is a 64 y o  male  HPI  Summer Nava is a 64 y o  gentleman with hx of gross hematuria and recurrent catheter dependent urinary retention  The patient has been catheter dependent for approximately 2 months  He has also had issues in the past usually precipitated by alcohol  He recently had a trial of void which he again failed  He hates his catheter  He had cysto showing bilobar hypertrophy (no median lobe) and TRUS volume of 108cc  He was recently started on finasteride (already on Flomax)  Reports he has had more issues with bladder spasms and pericatheter leakage since his last catheter was placed  As result he says he has cut down on drinking  Regarding his PSA he does have a history of negative prostate biopsy in the past but we do not have access to the records  His last PSA was 4 5 in 2020, repeat ordered but not done  His father had prostate cancer  Also with angiokertoma of scrotum  He is not on blood thinners does take a lot of NSAIDs because of discomfort with catheterization    Abdominal surgical history is significant for appendectomy and umbilical mesh for hernia  Pt is here with his sister  Past Surgical History:   Procedure Laterality Date    APPENDECTOMY      CYST REMOVAL      HERNIA REPAIR      UVULECTOMY          Past Medical History:   Diagnosis Date    Sleep apnea              Review of Systems   Constitutional: Negative for chills and fever  HENT: Negative for ear pain and sore throat  Eyes: Negative for pain and visual disturbance  Respiratory: Negative for cough and shortness of breath  Cardiovascular: Negative for chest pain and palpitations  Gastrointestinal: Negative for abdominal pain and vomiting  Genitourinary: Positive for difficulty urinating and penile pain  Negative for dysuria and hematuria  Musculoskeletal: Negative for arthralgias and back pain  Skin: Negative for color change and rash     Neurological: Negative for seizures and syncope  All other systems reviewed and are negative  Objective:      /68   Pulse 74   Ht 5' 9" (1 753 m)   Wt 115 kg (253 lb)   SpO2 96%   BMI 37 36 kg/m²     Lab Results   Component Value Date    PSA 4 5 (H) 12/09/2020          Physical Exam  Vitals reviewed  Constitutional:       Appearance: Normal appearance  He is normal weight  HENT:      Head: Normocephalic and atraumatic  Eyes:      Pupils: Pupils are equal, round, and reactive to light  Abdominal:      General: Abdomen is flat  Genitourinary:     Comments: Catheter in place draining urine  Neurological:      General: No focal deficit present  Mental Status: He is alert and oriented to person, place, and time  Psychiatric:         Mood and Affect: Mood normal          Thought Content: Thought content normal            Catheter was irrigated by our team and is draining well  Bag was exchanged  Orders  No orders of the defined types were placed in this encounter

## 2022-09-30 NOTE — ASSESSMENT & PLAN NOTE
Pt with neg biopsy in the past and last PSA 4 5 which is low density but with family hx of prostate cancer    Plan to repeat PSA since it has been 2 years  If high (which it may be from cox) have to consider MRI or biopsy prior to outlet surgery

## 2022-09-30 NOTE — ASSESSMENT & PLAN NOTE
Daniels seems to be draining so discomfort and leakage around is likely bladder spasms   Will try ditropan XL

## 2022-10-02 LAB — BACTERIA UR CULT: ABNORMAL

## 2022-10-03 DIAGNOSIS — R33.8 URINARY RETENTION DUE TO BENIGN PROSTATIC HYPERPLASIA: ICD-10-CM

## 2022-10-03 DIAGNOSIS — N40.1 URINARY RETENTION DUE TO BENIGN PROSTATIC HYPERPLASIA: ICD-10-CM

## 2022-10-03 DIAGNOSIS — R97.20 ELEVATED PSA: Primary | ICD-10-CM

## 2022-10-04 ENCOUNTER — TELEPHONE (OUTPATIENT)
Dept: UROLOGY | Facility: AMBULATORY SURGERY CENTER | Age: 62
End: 2022-10-04

## 2022-10-04 NOTE — TELEPHONE ENCOUNTER
Spoke with patient and provided him with central scheduling phone number to call and get MRI prostate scheduled  He was informed his PSA 5 6  He agrees to call and get scheduled

## 2022-10-04 NOTE — TELEPHONE ENCOUNTER
----- Message from Alfred Zheng MD sent at 10/3/2022  1:15 PM EDT -----  Please tell the patient that his PSA is slightly higher than before and therefore I think we should get an MRI of the prostate (which is ordered) as we discussed in clinic before committing to benign prostate surgery

## 2022-10-05 LAB
BACTERIA UR CULT: ABNORMAL
BACTERIA UR CULT: ABNORMAL

## 2022-10-06 ENCOUNTER — TELEPHONE (OUTPATIENT)
Dept: UROLOGY | Facility: CLINIC | Age: 62
End: 2022-10-06

## 2022-10-06 ENCOUNTER — TELEPHONE (OUTPATIENT)
Dept: UROLOGY | Facility: MEDICAL CENTER | Age: 62
End: 2022-10-06

## 2022-10-06 ENCOUNTER — PREP FOR PROCEDURE (OUTPATIENT)
Dept: UROLOGY | Facility: CLINIC | Age: 62
End: 2022-10-06

## 2022-10-06 DIAGNOSIS — N40.1 URINARY RETENTION DUE TO BENIGN PROSTATIC HYPERPLASIA: ICD-10-CM

## 2022-10-06 DIAGNOSIS — R33.8 URINARY RETENTION DUE TO BENIGN PROSTATIC HYPERPLASIA: ICD-10-CM

## 2022-10-06 DIAGNOSIS — R97.20 ELEVATED PSA: Primary | ICD-10-CM

## 2022-10-06 DIAGNOSIS — Z97.8 FOLEY CATHETER IN PLACE: ICD-10-CM

## 2022-10-06 NOTE — TELEPHONE ENCOUNTER
Spoke with patient and provided him with central scheduling phone number to call and have prostate MRI done STAT  Patient asked why and he was informed it is due to his elevated PSA  We have to rule out cancer prior to scheduling him for surgery  He verbalized understanding and agrees to plan  Office will be in touch once results are back

## 2022-10-06 NOTE — PROGRESS NOTES
Reordering mri as stat   I ordered earlier today as stat but did not put stat in 2nd place it asked about

## 2022-10-06 NOTE — TELEPHONE ENCOUNTER
Spoke with patient and he is scheduled for 10/12 with AS at [facility]  He is aware the hospital will call the day prior with time of arrival, nothing to eat or drink after midnight, he will need a , will stay overnight, and to hold blood thinning medications 7 days prior  He does not require any further testing after MRI tomorrow  Mailed surgical pkt to patient

## 2022-10-06 NOTE — TELEPHONE ENCOUNTER
Called patient to get him scheduled for his procedure on 10/12, he was having problems with MRI getting scheduled as STAT  I called MRI and spoke with Dr Sam Meckel who made sure order was scheduled STAT       Patient is scheduled for STAT MRI on 10/7 at 12:15pm

## 2022-10-07 ENCOUNTER — APPOINTMENT (OUTPATIENT)
Dept: RADIOLOGY | Age: 62
End: 2022-10-07
Payer: MEDICARE

## 2022-10-07 ENCOUNTER — HOSPITAL ENCOUNTER (OUTPATIENT)
Dept: RADIOLOGY | Age: 62
Discharge: HOME | End: 2022-10-07
Payer: MEDICARE

## 2022-10-07 ENCOUNTER — TELEPHONE (OUTPATIENT)
Dept: UROLOGY | Facility: CLINIC | Age: 62
End: 2022-10-07

## 2022-10-07 DIAGNOSIS — R33.8 URINARY RETENTION DUE TO BENIGN PROSTATIC HYPERPLASIA: ICD-10-CM

## 2022-10-07 DIAGNOSIS — Z97.8 FOLEY CATHETER IN PLACE: ICD-10-CM

## 2022-10-07 DIAGNOSIS — R97.20 ELEVATED PSA: ICD-10-CM

## 2022-10-07 DIAGNOSIS — R97.20 ELEVATED PSA: Primary | ICD-10-CM

## 2022-10-07 DIAGNOSIS — N30.00 ACUTE CYSTITIS WITHOUT HEMATURIA: ICD-10-CM

## 2022-10-07 DIAGNOSIS — N40.1 URINARY RETENTION DUE TO BENIGN PROSTATIC HYPERPLASIA: ICD-10-CM

## 2022-10-07 PROCEDURE — G1004 CDSM NDSC: HCPCS

## 2022-10-07 PROCEDURE — 72197 MRI PELVIS W/O & W/DYE: CPT

## 2022-10-07 PROCEDURE — 76377 3D RENDER W/INTRP POSTPROCES: CPT

## 2022-10-07 PROCEDURE — A9585 GADOBUTROL INJECTION: HCPCS | Performed by: UROLOGY

## 2022-10-07 RX ADMIN — GADOBUTROL 11 ML: 604.72 INJECTION INTRAVENOUS at 13:10

## 2022-10-07 NOTE — TELEPHONE ENCOUNTER
I called the patient relayed the results of his prostate MRI which shows a PI-RADS 4 lesion  We discussed implications of this  I recommend we hold off on HoLEP and do an MRI guided fusion biopsy because if he has clinically significant prostate cancer he may elect for prostatectomy and therefore HoLEP would be unnecessary surgery  We did discuss that if he wanted radiation HoLEP will still be indicated but most men in their early 62s opt for radical prostatectomy over radiation  Patient is amenable to getting fusion biopsy    Will cancel HoLEP surgery

## 2022-10-10 NOTE — TELEPHONE ENCOUNTER
Dr Edilia Soni,  Patient has been scheduled for 1st available which is 11/1/2022 with Dr Seth Jo at the [place] [address]  Unfortunately the [facility] schedule with you for tomorrow is set, as you are scheduled at [facility] the USB needed to be upload and picked up by Hector Rankin to be checked, this happened last week and we would not have enough time to make arrangements to add a case tomorrow   Also, patient will need authorization for the case that will take an estimated 2 weeks with his Insurance      -instructions given verbally and mailed  -patient aware to be NPO, needs a  and use an enema 1 hour prior to leaving the house morning of procedure  -patient aware to avoid any potentially blood thinning medications 7 days prior  -Urine C&S  2 weeks prior  -[employer]/Healthcare assistance -

## 2022-10-12 PROBLEM — Z12.5 SCREENING FOR PROSTATE CANCER: Status: RESOLVED | Noted: 2020-12-08 | Resolved: 2022-10-12

## 2022-10-12 PROBLEM — Z13.1 SCREENING FOR DIABETES MELLITUS: Status: RESOLVED | Noted: 2020-12-08 | Resolved: 2022-10-12

## 2022-10-12 PROBLEM — Z12.11 SCREENING FOR COLON CANCER: Status: RESOLVED | Noted: 2020-12-08 | Resolved: 2022-10-12

## 2022-10-12 PROBLEM — Z13.6 SCREENING FOR CARDIOVASCULAR CONDITION: Status: RESOLVED | Noted: 2020-12-08 | Resolved: 2022-10-12

## 2022-10-12 PROBLEM — Z11.59 NEED FOR HEPATITIS C SCREENING TEST: Status: RESOLVED | Noted: 2020-12-08 | Resolved: 2022-10-12

## 2022-10-17 ENCOUNTER — TELEPHONE (OUTPATIENT)
Dept: UROLOGY | Facility: MEDICAL CENTER | Age: 62
End: 2022-10-17

## 2022-10-17 NOTE — TELEPHONE ENCOUNTER
DOS 11/1/22 procedure [id number] approved [facility]#[id number] dates 11/1/22-1/1/23

## 2022-10-24 ENCOUNTER — TELEPHONE (OUTPATIENT)
Dept: OTHER | Facility: OTHER | Age: 62
End: 2022-10-24

## 2022-10-24 NOTE — TELEPHONE ENCOUNTER
Pt calling to make sure everything is done prior to his procedure  For 11/12022  Calling about paperwork for pre admission testing   Please call patient back to discuss

## 2022-10-25 ENCOUNTER — APPOINTMENT (OUTPATIENT)
Dept: LAB | Facility: CLINIC | Age: 62
End: 2022-10-25

## 2022-10-25 DIAGNOSIS — R97.20 ELEVATED PSA: ICD-10-CM

## 2022-10-27 RX ORDER — SULFAMETHOXAZOLE AND TRIMETHOPRIM 800; 160 MG/1; MG/1
1 TABLET ORAL 2 TIMES DAILY
Qty: 14 TABLET | Refills: 0 | Status: SHIPPED | OUTPATIENT
Start: 2022-10-27 | End: 2022-11-03

## 2022-10-28 ENCOUNTER — TELEPHONE (OUTPATIENT)
Dept: UROLOGY | Facility: MEDICAL CENTER | Age: 62
End: 2022-10-28

## 2022-10-28 LAB
BACTERIA UR CULT: ABNORMAL
BACTERIA UR CULT: ABNORMAL

## 2022-10-28 NOTE — TELEPHONE ENCOUNTER
Called and left detailed message regarding the prep for pt's upcoming procedure       I am calling in regrades to your prep instructions for your appointment at the [place] [address] [facility] [address] [place] [address]  on 11/1/22 with Dr Jose Salas  under IV Se  We need you to please make sure to stop all blood thinners (including multivitamins) ( Eliquis or Gala Skillern should be clarified with Cardiology before stopping) 7 day prior to you appointment  Pt should have nothing to eat after midnight the day before the procedure  The pt will need to make sure they have a   Finally the Pt will need to use an enema at least 2 hour prior to the appointment   If you have any questions please call [phone] and ask for Shila Duvall "

## 2022-10-31 NOTE — H&P
[address] [place] [address] Mathias Moritz [address], [address] [facility]  [phone]  MD Nicholas Bennett MD Leamon Alfred, NP    Office Note  Patient ID:  Name:  Alma Delia Lion  MRN:  [id number]  :  1968/49 y.o. Date:  2017      HISTORY OF PRESENT ILLNESS:  Alma Delia Lion is a 52 y.o.  perimenopausal female who was referred to me for a left ovarian mass, pelvic pain, and a rising CA-125 by Dr. Ilan Guevara. I first met her in 2017. She was noted to have a left ovarian mass back in late  when she presented for evaluation of pelvic pain. A CA-125 at that time was mildly elevated. She went to [facility] for a few months before returning for repeat evaluation. The ovarian mass had decreased in size, but her CA-125 was a little bit higher. In 2017 she was taken to the OR by Dr. Bogdan Ocasio for laparoscopic evaluation. The plan was to perform a left salpingooopohorectomy. At the time of surgery she was noted to have a normal appearing uterus, but there were significant bowel and omental adhesions that completely covered and precluded visualization of the left ovary. There also appeared to be a right hydrosalpinx. Overall findings were consistent with pelvic endometriosis. The procedure was aborted at that time. She was referred to me for surgical consultation. A  service was used for her visit due to her limited [place]. She and her  were concerned about the cost of surgery, as they were still paying off the last surgery. She did not want to have surgery at the time of her last visit. A repeat CA-125 last week was minimally elevated and about the same as the prior check. She presents today for follow-up. She reports less pain than before.         ROS:   and GI review: Negative  Cardiopulmonary review:Negative   Musculoskeletal:  Negative    A comprehensive review of systems was negative except for UROLOGY H&P NOTE     CHIEF COMPLAINT   Francisca Miner is a 58 y o  male with a complaint of No chief complaint on file  History of Present Illness:     58 y o  male known to [facility] and [place]  Patient with a known enlarged prostate  Patient developed ongoing urinary retention  Outlet evaluation performed showing bilobar hypertrophy without a median lobe and a transrectal ultrasound volume of approximately 108 g  Patient was started on finasteride in addition to his Flomax  Was seen and evaluated for holmium laser enucleation of the prostate however given some elevation of his PSA, preoperative multiparametric MRI was performed  This demonstrated abnormality  Patient presents today for fusion biopsy  Patient's father did have prostate cancer      Lab Results   Component Value Date    PSA 5 6 (H) 09/30/2022    PSA 4 5 (H) 12/09/2020     Past Medical History:     Past Medical History:   Diagnosis Date   • Sleep apnea        PAST SURGICAL HISTORY:     Past Surgical History:   Procedure Laterality Date   • APPENDECTOMY     • CYST REMOVAL     • HERNIA REPAIR     • UVULECTOMY         CURRENT MEDICATIONS:     Current Facility-Administered Medications   Medication Dose Route Frequency Provider Last Rate Last Admin   • gentamicin (GARAMYCIN) 240 mg in sodium chloride 0 9 % 100 mL IVPB  3 4 mg/kg (Ideal) Intravenous Once Raisa Travis MD           ALLERGIES:     Allergies   Allergen Reactions   • Keflex [Cephalexin]        SOCIAL HISTORY:     Social History     Socioeconomic History   • Marital status: Single     Spouse name: None   • Number of children: None   • Years of education: None   • Highest education level: None   Occupational History   • None   Tobacco Use   • Smoking status: Current Some Day Smoker     Types: Cigarettes   • Smokeless tobacco: Never Used   • Tobacco comment: rarely - one a week or so   Vaping Use   • Vaping Use: Every day   • Substances: Nicotine   Substance and Sexual Activity   • Alcohol use: Not Currently     Comment: occ   • Drug use: Yes     Types: Marijuana     Comment: occ   • Sexual activity: Not Currently   Other Topics Concern   • None   Social History Narrative   • None     Social Determinants of Health     Financial Resource Strain: Not on file   Food Insecurity: Not on file   Transportation Needs: Not on file   Physical Activity: Not on file   Stress: Not on file   Social Connections: Not on file   Intimate Partner Violence: Not on file   Housing Stability: Not on file       SOCIAL HISTORY:     Family History   Problem Relation Age of Onset   • Dementia Mother    • Prostate cancer Father        REVIEW OF SYSTEMS:     Review of Systems   Constitutional: Negative  Respiratory: Positive for wheezing  Cardiovascular: Negative  Gastrointestinal: Negative  Genitourinary: Positive for penile pain  Musculoskeletal: Negative  Skin: Negative  Psychiatric/Behavioral: Negative  PHYSICAL EXAM:     /95   Pulse 78   Temp 98 6 °F (37 °C) (Tympanic)   Resp 20   SpO2 94%     General:  Healthy appearing male in no acute distress  They have a normal affect  There is not appear to be any gross neurologic defects or abnormalities  HEENT:  Normocephalic, atraumatic  Neck is supple without any palpable lymphadenopathy  Cardiovascular:  Patient has normal palpable distal radial pulses  There is no significant peripheral edema  No JVD is noted  Respiratory:  Patient has unlabored respirations  There is no audible wheeze or rhonchi  Abdomen:  Abdomen is soft and nontender  There is no tympany  Inguinal and umbilical hernia are not appreciated  Genitourinary: Catheter in place    Musculoskeletal:  Patient does not have significant CVA tenderness in the  flank with palpation or percussion  They full range of motion in all 4 extremities  Strength in all 4 extremities appears congruent    Patient is able to ambulate without assistance or that written in the History of Present Illness. , 10 point ROS    OB/GYN ROS:    Prior Lsc demonstrating pelvic endometriosis  Patient denies any abnormal bleeding or vaginal discharge. Problem List:  Patient Active Problem List    Diagnosis Date Noted    Ovarian mass, left 2017    Pelvic pain in female 2017    Leiomyoma of body of uterus 2017    Elevated CA-125 2017     PMH:  Past Medical History:   Diagnosis Date    Environmental allergies     Ill-defined condition     blood disease (thalalassemia)    Thyroid disease       PSH:  History reviewed. No pertinent surgical history. Social History:  Social History   Substance Use Topics    Smoking status: Never Smoker    Smokeless tobacco: Never Used    Alcohol use No      Family History:  History reviewed. No pertinent family history. Medications: (reviewed)  Current Outpatient Prescriptions   Medication Sig    atenolol (TENORMIN) 50 mg tablet     ibuprofen (MOTRIN) 400 mg tablet Take 1 Tab by mouth every six (6) hours as needed for Pain.  methIMAzole (TAPAZOLE) 5 mg tablet Take 5 mg by mouth two (2) times a day. Indications: hyperthyroidism    acetaminophen (TYLENOL) 325 mg tablet Take 325 mg by mouth every four (4) hours as needed for Pain. Indications: Pain    oxyCODONE-acetaminophen (PERCOCET) 5-325 mg per tablet Take 1-2 Tabs by mouth every four (4) hours as needed for Pain. Max Daily Amount: 12 Tabs.  cetirizine (ZYRTEC) 10 mg tablet Take 10 mg by mouth daily. Indications: ALLERGIC RHINITIS     No current facility-administered medications for this visit. Allergies: (reviewed)  No Known Allergies       OBJECTIVE:    Physical Exam:  VITAL SIGNS: Vitals:    17 1539   BP: 119/83   Pulse: 76   Weight: 118 lb 8 oz (53.8 kg)   Height: 5' 2.99\" (1.6 m)     Body mass index is 21 kg/(m^2). GENERAL KAZ: Conversant, alert, oriented. No acute distress. HEENT: HEENT. No thyroid enlargement. No JVD. Neck: Supple without restrictions. RESPIRATORY: Clear to auscultation and percussion to the bases. No CVAT. CARDIOVASC: RRR without murmur/rub. GASTROINT: soft, non-tender, without masses or organomegaly   MUSCULOSKEL: no joint tenderness, deformity or swelling   EXTREMITIES: extremities normal, atraumatic, no cyanosis or edema   PELVIC: Vulva and vagina appear normal. Bimanual exam reveals normal uterus and adnexa. RECTAL: Deferred   LALA SURVEY: No suspicious lymphadenopathy or edema noted. NEURO: Grossly intact. No acute deficit. Imaging:  Multiple outside pelvic ultrasounds from [facility] reviewed. Pertinent findings noted in HPI. IMPRESSION/PLAN:  Eulalia Dutton is a 52 y.o. female with a working diagnosis of pelvic pain and a left adnexal mass, presumably secondary to endometriosis. I reviewed with Eulalia Dutton her medical records, physical exam, and review of symptoms. I again explained that the rising CA-125 is most likely due to endometriosis, but malignancy cannot be excluded without pathology. I again discussed laparoscopic evaluation with resection of the mass, laparoscopic hysterectomy, removal of the contralateral ovary, lysis of adhesions, and resection of any endometriosis implants. She would like to hold off on any surgery at this time. I feel very comfortable that she does not have a malignancy and I explained to her that it is OK that she holds off on surgery for now. I have instructed her to call if the pain gets worse.       Signed By: Teresa Lugo MD     9/26/2017/3:25 PM difficulty  Dermatologic:  Patient has no skin abnormalities or rashes  LABS:     CBC:   Lab Results   Component Value Date    WBC 6 97 08/24/2022    HGB 12 3 08/24/2022    HCT 37 6 08/24/2022    MCV 88 08/24/2022     08/24/2022       BMP:   Lab Results   Component Value Date    CALCIUM 8 3 08/24/2022    K 3 8 08/24/2022    CO2 23 08/24/2022    CL 96 08/24/2022    BUN 19 08/24/2022    CREATININE 1 31 (H) 08/24/2022     Urine Culture >100,000 cfu/ml Escherichia coli ESBL Abnormal     An Extended-Spectrum Beta-Lactamase is being produced by this organism (requires contact precautions)  Cephalosporins are NOT effective for treating these organisms  For SEVERE infections (i e  bacteremia, septic shock, etc ), Carbapenems are the drug of choice  For MILD infections (i e  isolated urinary or biliary infection), high-dose Zosyn may be used  This organism has been edited  The previous result was Gram Negative Wallace Enteric Like on 10/26/2022 at 1714 EDT    >100,000 cfu/ml Klebsiella aerogenes Abnormal     This organism has been edited  The previous result was Gram Negative Wallace Enteric Like on 10/26/2022 at 1714 EDT            Susceptibility     Escherichia coli ESBL Klebsiella aerogenes     KAMILAH KAMILAH     Amoxicillin + Clavulanate <=8/4 ug/ml Susceptible >16/8 ug/ml Resistant     Ampicillin ($$) >16 00 ug/ml Resistant >16 00 ug/ml Resistant     Ampicillin + Sulbactam ($) 8/4 ug/ml Susceptible 8/4 ug/ml Resistant     Aztreonam ($$$)  16 ug/ml Resistant <=4 ug/ml Susceptible     Cefazolin ($) >16 00 ug/ml Resistant >16 00 ug/ml Resistant     Cefepime ($) >16 00 ug/ml Resistant       Ceftazidime ($$) 16 ug/ml Resistant       Ceftriaxone ($$) >32 00 ug/ml Resistant       Cefuroxime ($$) >16 ug/ml Resistant <=4 ug/ml Susceptible     Ciprofloxacin ($)  >2 00 ug/ml Resistant <=0 25 ug/ml Susceptible     Ertapenem ($$$) <=0 5 ug/ml Susceptible <=0 5 ug/ml Susceptible     Fosfomycin   2 000 ug/ml Susceptible Gentamicin ($$) <=2 ug/ml Susceptible <=2 ug/ml Susceptible     Levofloxacin ($) >4 00 ug/ml Resistant <=0 50 ug/ml Susceptible     Nitrofurantoin <=32 ug/ml Susceptible 64 ug/ml Intermediate     Piperacillin + Tazobactam ($$$) <=8 ug/ml Susceptible <=8 ug/ml Susceptible     Tetracycline <=4 ug/ml Susceptible <=4 ug/ml Susceptible     Tobramycin ($) <=2 ug/ml Susceptible <=2 ug/ml Susceptible     Trimethoprim + Sulfamethoxazole ($$$) <=0 5/9 5 u  Susceptible <=0 5/9 5 u  Susceptible     ZID Performed Yes   Yes                      Specimen Collected: 10/25/22 12:03 PM Last Resulted: 10/28/22  4:25 PM             IMAGING:     10/7/22  MULTIPARAMETRIC MRI OF THE PROSTATE WITH AND WITHOUT CONTRAST-WITH 3-D POSTPROCESSING      INDICATION:   49-year-old male with urinary retention and elevated PSA  Patient to undergo surgery next week but need to evaluate prostate for lesions first based on prostate specific antigen (PSA)      COMPARISON: CT abdomen pelvis 8/24/2022      PSA LEVEL: 5 6 ng/mL on 9/30/2022, previously 4 5 ng/mL on 12/9/2020  PRIOR BIOPSY: Patient has undergone a prior prostate biopsy in the past which was negative      TECHNIQUE: The following pulse sequences were obtained:  Small field-of-view axial T1-weighted and multiplanar T2-weighted images; DWI axial and ADC map; large field of view axial T2 weighted images; T1w in-phase and opposed-phase axials of entire pelvis   and dynamic 3D T1w axial before and during IV contrast injection  Imaging performed on 3 0T MRI      CONTRAST:  Gadobutrol (Gadavist) 11 mL of Gadobutrol injection (SINGLE-DOSE)       TECHNICAL LIMITATIONS: None      FINDINGS:     PROSTATE:     Size: 5 4 x 6 2 x 6 1 cm = 106 2 cc  Post-biopsy hemorrhage:  None  Central gland enlargement (BPH): Marked    A 2 5 x 2 3 cm prostatic cyst in the left anterior transition zone      Focal lesions - localization as follows:     Lesion: 1       Size: 3 0 x 3 0 x 2 7 cm, series 5 image 16 and series 4 image 18  Location: Right anterior and posterior transition zone at the level of the base and mid gland  T2-weighted images: Score 3: Heterogeneous signal intensity with obscured margins; includes others that do not qualify as 2, 4 or 5  Diffusion-weighted images: Score 5: Focal markedly hypointense on ADC and markedly hyperintense on high b-value DWI, but greater than or equal to 1 5 cm in greatest dimension or definite extraprostatic extension/invasive behavior  Dynamic post-contrast images: (-) Lesion that does not enhance early compared to the surrounding prostate or enhances diffusely so that the margins of the enhancing area do not correspond to a finding on T2-weighted images and/or DWI  PI-RADS Assessment Category: 4, High (clinically significant cancer is likely to be present)  Extra-prostatic extension (EPE): Broadly abuts capsule without visualized gross EPE      SEMINAL VESICLES: Unremarkable     Note: Clinically significant cancer is defined on pathology/histology as Anastasiia score greater than or equal to 7, and/or volume of greater than or equal to 0 5 mL, and/or extraprostatic extension      URINARY BLADDER: Decompressed by Daniels catheter      LYMPH NODES: Prominent bilateral obturator lymph nodes measuring up to 0 9 cm on the left (6/6) and 0 7 cm on the right (6/13)      BONES: No suspicious osseous lesion       Sigmoid diverticulosis      IMPRESSION:     1  PI-RADSv2 1 Category 4 -High (clinically significant cancer is likely to be present)  Index lesion #1 in the right anterior and posterior transition zone at the level of the base and mid gland measuring up to 3 0 cm  The lesion broadly abuts capsule   without visualized gross extraprostatic extension      2   No extraprostatic tumor, seminal vesicle invasion, or pelvic osseous metastatic disease      3  Prominent bilateral obturator lymph nodes measuring up to 0 9 cm, nonspecific      4   Calculated prostate volume of 106 2 cc  PATHOLOGY:     9/7/22  Final Diagnosis   A  Urine, Cystoscopic, :  Negative for high grade urothelial carcinoma ([address]) - see comment  Rare benign urothelial and squamous cells, limited by the presence of lubricant  Scattered mixed inflammatory cells     ASSESSMENT:     58 y o  male with catheter dependence, elevated PSA, abnormal multiparametric MRI    PLAN:     Plan for transperineal fusion biopsy of the prostate discussed and described  Risks and benefits reviewed  Patient signed informed consent  Patient has been pretreated with Bactrim given an outpatient positive urine culture  Patient will need gentamicin given the culture data

## 2022-11-01 ENCOUNTER — ANESTHESIA (OUTPATIENT)
Dept: GASTROENTEROLOGY | Facility: HOSPITAL | Age: 62
End: 2022-11-01

## 2022-11-01 ENCOUNTER — APPOINTMENT (EMERGENCY)
Dept: RADIOLOGY | Facility: HOSPITAL | Age: 62
End: 2022-11-01

## 2022-11-01 ENCOUNTER — ANESTHESIA EVENT (OUTPATIENT)
Dept: GASTROENTEROLOGY | Facility: HOSPITAL | Age: 62
End: 2022-11-01

## 2022-11-01 ENCOUNTER — HOSPITAL ENCOUNTER (INPATIENT)
Facility: HOSPITAL | Age: 62
LOS: 3 days | Discharge: HOME | End: 2022-11-04
Attending: EMERGENCY MEDICINE | Admitting: HOSPITALIST

## 2022-11-01 ENCOUNTER — NURSE TRIAGE (OUTPATIENT)
Dept: OTHER | Facility: OTHER | Age: 62
End: 2022-11-01

## 2022-11-01 ENCOUNTER — HOSPITAL ENCOUNTER (OUTPATIENT)
Facility: HOSPITAL | Age: 62
Setting detail: OUTPATIENT SURGERY
Discharge: HOME | End: 2022-11-01
Attending: UROLOGY | Admitting: UROLOGY

## 2022-11-01 VITALS
SYSTOLIC BLOOD PRESSURE: 106 MMHG | HEART RATE: 66 BPM | DIASTOLIC BLOOD PRESSURE: 65 MMHG | RESPIRATION RATE: 18 BRPM | OXYGEN SATURATION: 93 % | TEMPERATURE: 98.1 F

## 2022-11-01 DIAGNOSIS — R65.20 SEVERE SEPSIS (HCC): Primary | ICD-10-CM

## 2022-11-01 DIAGNOSIS — R97.20 ELEVATED PROSTATE SPECIFIC ANTIGEN (PSA): ICD-10-CM

## 2022-11-01 DIAGNOSIS — G47.30 SLEEP APNEA, UNSPECIFIED TYPE: ICD-10-CM

## 2022-11-01 DIAGNOSIS — N39.0 UTI (URINARY TRACT INFECTION): ICD-10-CM

## 2022-11-01 DIAGNOSIS — N32.89 BLADDER SPASMS: ICD-10-CM

## 2022-11-01 DIAGNOSIS — Z97.8 FOLEY CATHETER IN PLACE: ICD-10-CM

## 2022-11-01 DIAGNOSIS — R33.8 URINARY RETENTION DUE TO BENIGN PROSTATIC HYPERPLASIA: ICD-10-CM

## 2022-11-01 DIAGNOSIS — R06.82 TACHYPNEA: ICD-10-CM

## 2022-11-01 DIAGNOSIS — A41.9 SEVERE SEPSIS (HCC): Primary | ICD-10-CM

## 2022-11-01 DIAGNOSIS — R50.9 FEVER: ICD-10-CM

## 2022-11-01 DIAGNOSIS — N40.1 URINARY RETENTION DUE TO BENIGN PROSTATIC HYPERPLASIA: ICD-10-CM

## 2022-11-01 LAB
2HR DELTA HS TROPONIN: 3 NG/L
4HR DELTA HS TROPONIN: 7 NG/L
ALBUMIN SERPL BCP-MCNC: 4.3 G/DL (ref 3.5–5)
ALP SERPL-CCNC: 66 U/L (ref 34–104)
ALT SERPL W P-5'-P-CCNC: 31 U/L (ref 7–52)
ANION GAP SERPL CALCULATED.3IONS-SCNC: 10 MMOL/L (ref 4–13)
ANION GAP SERPL CALCULATED.3IONS-SCNC: 12 MMOL/L (ref 4–13)
APTT PPP: 29 SECONDS (ref 23–37)
AST SERPL W P-5'-P-CCNC: 23 U/L (ref 13–39)
ATRIAL RATE: 108 BPM
BACTERIA UR QL AUTO: ABNORMAL /HPF
BASE EX.OXY STD BLDV CALC-SCNC: 69.1 % (ref 60–80)
BASE EXCESS BLDV CALC-SCNC: -2.9 MMOL/L
BASOPHILS # BLD MANUAL: 0.06 THOUSAND/UL (ref 0–0.1)
BASOPHILS NFR MAR MANUAL: 1 % (ref 0–1)
BILIRUB SERPL-MCNC: 0.82 MG/DL (ref 0.2–1)
BILIRUB UR QL STRIP: NEGATIVE
BUN SERPL-MCNC: 26 MG/DL (ref 5–25)
BUN SERPL-MCNC: 27 MG/DL (ref 5–25)
CALCIUM SERPL-MCNC: 8.6 MG/DL (ref 8.4–10.2)
CALCIUM SERPL-MCNC: 9.2 MG/DL (ref 8.4–10.2)
CARDIAC TROPONIN I PNL SERPL HS: 21 NG/L
CARDIAC TROPONIN I PNL SERPL HS: 24 NG/L
CARDIAC TROPONIN I PNL SERPL HS: 28 NG/L
CHLORIDE SERPL-SCNC: 100 MMOL/L (ref 96–108)
CHLORIDE SERPL-SCNC: 101 MMOL/L (ref 96–108)
CLARITY UR: ABNORMAL
CO2 SERPL-SCNC: 20 MMOL/L (ref 21–32)
CO2 SERPL-SCNC: 21 MMOL/L (ref 21–32)
COLOR UR: YELLOW
CREAT SERPL-MCNC: 1.51 MG/DL (ref 0.6–1.3)
CREAT SERPL-MCNC: 1.68 MG/DL (ref 0.6–1.3)
EOSINOPHIL # BLD MANUAL: 0 THOUSAND/UL (ref 0–0.4)
EOSINOPHIL NFR BLD MANUAL: 0 % (ref 0–6)
ERYTHROCYTE [DISTWIDTH] IN BLOOD BY AUTOMATED COUNT: 14.6 % (ref 11.6–15.1)
GFR SERPL CREATININE-BSD FRML MDRD: 42 ML/MIN/1.73SQ M
GFR SERPL CREATININE-BSD FRML MDRD: 48 ML/MIN/1.73SQ M
GLUCOSE SERPL-MCNC: 101 MG/DL (ref 65–140)
GLUCOSE SERPL-MCNC: 119 MG/DL (ref 65–140)
GLUCOSE UR STRIP-MCNC: NEGATIVE MG/DL
HCO3 BLDV-SCNC: 21.9 MMOL/L (ref 24–30)
HCT VFR BLD AUTO: 39.8 % (ref 36.5–49.3)
HGB BLD-MCNC: 13.1 G/DL (ref 12–17)
HGB UR QL STRIP.AUTO: ABNORMAL
HYALINE CASTS #/AREA URNS LPF: ABNORMAL /LPF
INR PPP: 1.12 (ref 0.84–1.19)
KETONES UR STRIP-MCNC: NEGATIVE MG/DL
LACTATE SERPL-SCNC: 2.1 MMOL/L (ref 0.5–2)
LACTATE SERPL-SCNC: 3.1 MMOL/L (ref 0.5–2)
LACTATE SERPL-SCNC: 4.4 MMOL/L (ref 0.5–2)
LEUKOCYTE ESTERASE UR QL STRIP: ABNORMAL
LYMPHOCYTES # BLD AUTO: 0.31 THOUSAND/UL (ref 0.6–4.47)
LYMPHOCYTES # BLD AUTO: 5 % (ref 14–44)
MCH RBC QN AUTO: 28 PG (ref 26.8–34.3)
MCHC RBC AUTO-ENTMCNC: 32.9 G/DL (ref 31.4–37.4)
MCV RBC AUTO: 85 FL (ref 82–98)
METAMYELOCYTES NFR BLD MANUAL: 3 % (ref 0–1)
MONOCYTES # BLD AUTO: 0 THOUSAND/UL (ref 0–1.22)
MONOCYTES NFR BLD: 0 % (ref 4–12)
MUCOUS THREADS UR QL AUTO: ABNORMAL
MYELOCYTES NFR BLD MANUAL: 1 % (ref 0–1)
NEUTROPHILS # BLD MANUAL: 5.45 THOUSAND/UL (ref 1.85–7.62)
NEUTS BAND NFR BLD MANUAL: 18 % (ref 0–8)
NEUTS SEG NFR BLD AUTO: 69 % (ref 43–75)
NITRITE UR QL STRIP: NEGATIVE
NON-SQ EPI CELLS URNS QL MICRO: ABNORMAL /HPF
O2 CT BLDV-SCNC: 13.3 ML/DL
P AXIS: 68 DEGREES
PCO2 BLDV: 38.3 MM HG (ref 42–50)
PH BLDV: 7.38 [PH] (ref 7.3–7.4)
PH UR STRIP.AUTO: 5.5 [PH]
PLATELET # BLD AUTO: 189 THOUSANDS/UL (ref 149–390)
PLATELET BLD QL SMEAR: ADEQUATE
PMV BLD AUTO: 9.7 FL (ref 8.9–12.7)
PO2 BLDV: 39.2 MM HG (ref 35–45)
POTASSIUM SERPL-SCNC: 4.1 MMOL/L (ref 3.5–5.3)
POTASSIUM SERPL-SCNC: 4.2 MMOL/L (ref 3.5–5.3)
PR INTERVAL: 172 MS
PROCALCITONIN SERPL-MCNC: 19.98 NG/ML
PROT SERPL-MCNC: 7.8 G/DL (ref 6.4–8.4)
PROT UR STRIP-MCNC: ABNORMAL MG/DL
PROTHROMBIN TIME: 14.6 SECONDS (ref 11.6–14.5)
QRS AXIS: -55 DEGREES
QRSD INTERVAL: 98 MS
QT INTERVAL: 314 MS
QTC INTERVAL: 420 MS
RBC # BLD AUTO: 4.68 MILLION/UL (ref 3.88–5.62)
RBC #/AREA URNS AUTO: ABNORMAL /HPF
RBC MORPH BLD: NORMAL
SODIUM SERPL-SCNC: 132 MMOL/L (ref 135–147)
SODIUM SERPL-SCNC: 132 MMOL/L (ref 135–147)
SP GR UR STRIP.AUTO: 1.02 (ref 1–1.03)
T WAVE AXIS: 71 DEGREES
UROBILINOGEN UR STRIP-ACNC: 2 MG/DL
VARIANT LYMPHS # BLD AUTO: 3 %
VENTRICULAR RATE: 108 BPM
WBC # BLD AUTO: 6.27 THOUSAND/UL (ref 4.31–10.16)
WBC #/AREA URNS AUTO: ABNORMAL /HPF
WBC CLUMPS # UR AUTO: PRESENT /UL

## 2022-11-01 RX ORDER — PROPOFOL 10 MG/ML
INJECTION, EMULSION INTRAVENOUS AS NEEDED
Status: DISCONTINUED | OUTPATIENT
Start: 2022-11-01 | End: 2022-11-01

## 2022-11-01 RX ORDER — HEPARIN SODIUM 5000 [USP'U]/ML
5000 INJECTION, SOLUTION INTRAVENOUS; SUBCUTANEOUS EVERY 8 HOURS SCHEDULED
Status: DISCONTINUED | OUTPATIENT
Start: 2022-11-01 | End: 2022-11-04 | Stop reason: HOSPADM

## 2022-11-01 RX ORDER — SODIUM CHLORIDE, SODIUM GLUCONATE, SODIUM ACETATE, POTASSIUM CHLORIDE, MAGNESIUM CHLORIDE, SODIUM PHOSPHATE, DIBASIC, AND POTASSIUM PHOSPHATE .53; .5; .37; .037; .03; .012; .00082 G/100ML; G/100ML; G/100ML; G/100ML; G/100ML; G/100ML; G/100ML
75 INJECTION, SOLUTION INTRAVENOUS CONTINUOUS
Status: DISCONTINUED | OUTPATIENT
Start: 2022-11-01 | End: 2022-11-03

## 2022-11-01 RX ORDER — SODIUM CHLORIDE, SODIUM GLUCONATE, SODIUM ACETATE, POTASSIUM CHLORIDE, MAGNESIUM CHLORIDE, SODIUM PHOSPHATE, DIBASIC, AND POTASSIUM PHOSPHATE .53; .5; .37; .037; .03; .012; .00082 G/100ML; G/100ML; G/100ML; G/100ML; G/100ML; G/100ML; G/100ML
1000 INJECTION, SOLUTION INTRAVENOUS ONCE
Status: COMPLETED | OUTPATIENT
Start: 2022-11-01 | End: 2022-11-01

## 2022-11-01 RX ORDER — NICOTINE 21 MG/24HR
14 PATCH, TRANSDERMAL 24 HOURS TRANSDERMAL DAILY
Status: DISCONTINUED | OUTPATIENT
Start: 2022-11-02 | End: 2022-11-04 | Stop reason: HOSPADM

## 2022-11-01 RX ORDER — LANOLIN ALCOHOL/MO/W.PET/CERES
3 CREAM (GRAM) TOPICAL
Status: DISCONTINUED | OUTPATIENT
Start: 2022-11-01 | End: 2022-11-04 | Stop reason: HOSPADM

## 2022-11-01 RX ORDER — CEFTRIAXONE 2 G/50ML
2000 INJECTION, SOLUTION INTRAVENOUS ONCE
Status: COMPLETED | OUTPATIENT
Start: 2022-11-01 | End: 2022-11-01

## 2022-11-01 RX ORDER — LEVOFLOXACIN 5 MG/ML
750 INJECTION, SOLUTION INTRAVENOUS ONCE
Status: DISCONTINUED | OUTPATIENT
Start: 2022-11-01 | End: 2022-11-01

## 2022-11-01 RX ORDER — FENTANYL CITRATE 50 UG/ML
INJECTION, SOLUTION INTRAMUSCULAR; INTRAVENOUS AS NEEDED
Status: DISCONTINUED | OUTPATIENT
Start: 2022-11-01 | End: 2022-11-01

## 2022-11-01 RX ORDER — TAMSULOSIN HYDROCHLORIDE 0.4 MG/1
0.4 CAPSULE ORAL
Status: DISCONTINUED | OUTPATIENT
Start: 2022-11-02 | End: 2022-11-04 | Stop reason: HOSPADM

## 2022-11-01 RX ORDER — LIDOCAINE HYDROCHLORIDE 10 MG/ML
INJECTION, SOLUTION EPIDURAL; INFILTRATION; INTRACAUDAL; PERINEURAL AS NEEDED
Status: DISCONTINUED | OUTPATIENT
Start: 2022-11-01 | End: 2022-11-01

## 2022-11-01 RX ORDER — SODIUM CHLORIDE, SODIUM LACTATE, POTASSIUM CHLORIDE, CALCIUM CHLORIDE 600; 310; 30; 20 MG/100ML; MG/100ML; MG/100ML; MG/100ML
INJECTION, SOLUTION INTRAVENOUS CONTINUOUS PRN
Status: DISCONTINUED | OUTPATIENT
Start: 2022-11-01 | End: 2022-11-01

## 2022-11-01 RX ORDER — OXYBUTYNIN CHLORIDE 5 MG/1
15 TABLET, EXTENDED RELEASE ORAL
Status: DISCONTINUED | OUTPATIENT
Start: 2022-11-01 | End: 2022-11-04 | Stop reason: HOSPADM

## 2022-11-01 RX ORDER — CEFTRIAXONE 1 G/50ML
1000 INJECTION, SOLUTION INTRAVENOUS EVERY 24 HOURS
Status: DISCONTINUED | OUTPATIENT
Start: 2022-11-01 | End: 2022-11-01

## 2022-11-01 RX ORDER — ACETAMINOPHEN 325 MG/1
650 TABLET ORAL EVERY 6 HOURS PRN
Status: DISCONTINUED | OUTPATIENT
Start: 2022-11-01 | End: 2022-11-04 | Stop reason: HOSPADM

## 2022-11-01 RX ORDER — OXYBUTYNIN CHLORIDE 15 MG/1
15 TABLET, EXTENDED RELEASE ORAL
Qty: 30 TABLET | Refills: 0 | Status: SHIPPED | OUTPATIENT
Start: 2022-11-01 | End: 2022-11-18 | Stop reason: SDUPTHER

## 2022-11-01 RX ORDER — FINASTERIDE 5 MG/1
5 TABLET, FILM COATED ORAL DAILY
Status: DISCONTINUED | OUTPATIENT
Start: 2022-11-02 | End: 2022-11-04 | Stop reason: HOSPADM

## 2022-11-01 RX ADMIN — Medication 3 MG: at 22:12

## 2022-11-01 RX ADMIN — PROPOFOL 20 MG: 10 INJECTION, EMULSION INTRAVENOUS at 10:30

## 2022-11-01 RX ADMIN — PROPOFOL 80 MG: 10 INJECTION, EMULSION INTRAVENOUS at 10:11

## 2022-11-01 RX ADMIN — SODIUM CHLORIDE, SODIUM GLUCONATE, SODIUM ACETATE, POTASSIUM CHLORIDE, MAGNESIUM CHLORIDE, SODIUM PHOSPHATE, DIBASIC, AND POTASSIUM PHOSPHATE 1000 ML: .53; .5; .37; .037; .03; .012; .00082 INJECTION, SOLUTION INTRAVENOUS at 19:15

## 2022-11-01 RX ADMIN — FENTANYL CITRATE 25 MCG: 50 INJECTION INTRAMUSCULAR; INTRAVENOUS at 10:11

## 2022-11-01 RX ADMIN — ACETAMINOPHEN 650 MG: 325 TABLET ORAL at 22:12

## 2022-11-01 RX ADMIN — SODIUM CHLORIDE, SODIUM GLUCONATE, SODIUM ACETATE, POTASSIUM CHLORIDE, MAGNESIUM CHLORIDE, SODIUM PHOSPHATE, DIBASIC, AND POTASSIUM PHOSPHATE 75 ML/HR: .53; .5; .37; .037; .03; .012; .00082 INJECTION, SOLUTION INTRAVENOUS at 22:11

## 2022-11-01 RX ADMIN — PROPOFOL 30 MG: 10 INJECTION, EMULSION INTRAVENOUS at 10:18

## 2022-11-01 RX ADMIN — CEFTRIAXONE 2000 MG: 2 INJECTION, SOLUTION INTRAVENOUS at 18:07

## 2022-11-01 RX ADMIN — SODIUM CHLORIDE, SODIUM GLUCONATE, SODIUM ACETATE, POTASSIUM CHLORIDE, MAGNESIUM CHLORIDE, SODIUM PHOSPHATE, DIBASIC, AND POTASSIUM PHOSPHATE 1000 ML: .53; .5; .37; .037; .03; .012; .00082 INJECTION, SOLUTION INTRAVENOUS at 19:06

## 2022-11-01 RX ADMIN — SODIUM CHLORIDE, SODIUM LACTATE, POTASSIUM CHLORIDE, AND CALCIUM CHLORIDE: .6; .31; .03; .02 INJECTION, SOLUTION INTRAVENOUS at 10:08

## 2022-11-01 RX ADMIN — HEPARIN SODIUM 5000 UNITS: 5000 INJECTION INTRAVENOUS; SUBCUTANEOUS at 22:12

## 2022-11-01 RX ADMIN — PROPOFOL 30 MG: 10 INJECTION, EMULSION INTRAVENOUS at 10:16

## 2022-11-01 RX ADMIN — GENTAMICIN SULFATE 240 MG: 40 INJECTION, SOLUTION INTRAMUSCULAR; INTRAVENOUS at 10:05

## 2022-11-01 RX ADMIN — LIDOCAINE HYDROCHLORIDE 50 MG: 10 INJECTION, SOLUTION EPIDURAL; INFILTRATION; INTRACAUDAL; PERINEURAL at 10:11

## 2022-11-01 RX ADMIN — SODIUM CHLORIDE, SODIUM GLUCONATE, SODIUM ACETATE, POTASSIUM CHLORIDE, MAGNESIUM CHLORIDE, SODIUM PHOSPHATE, DIBASIC, AND POTASSIUM PHOSPHATE 1000 ML: .53; .5; .37; .037; .03; .012; .00082 INJECTION, SOLUTION INTRAVENOUS at 18:07

## 2022-11-01 RX ADMIN — FENTANYL CITRATE 25 MCG: 50 INJECTION INTRAMUSCULAR; INTRAVENOUS at 10:18

## 2022-11-01 RX ADMIN — OXYBUTYNIN CHLORIDE 15 MG: 5 TABLET, EXTENDED RELEASE ORAL at 22:12

## 2022-11-01 RX ADMIN — PROPOFOL 20 MG: 10 INJECTION, EMULSION INTRAVENOUS at 10:13

## 2022-11-01 RX ADMIN — PROPOFOL 20 MG: 10 INJECTION, EMULSION INTRAVENOUS at 10:23

## 2022-11-01 RX ADMIN — ERTAPENEM SODIUM 1000 MG: 1 INJECTION, POWDER, LYOPHILIZED, FOR SOLUTION INTRAMUSCULAR; INTRAVENOUS at 22:16

## 2022-11-01 NOTE — TELEPHONE ENCOUNTER
Regarding: POST OP SHAKING VIOLENTLY ELEVATE TEMP  ----- Message from Andrez Patiño sent at 11/1/2022  2:54 PM EDT -----  Patient had a prostate biopsy today, shaking violently, temp went from up to 104 now at 102  3  He feels very cold covered in blanket

## 2022-11-01 NOTE — TELEPHONE ENCOUNTER
Reason for Disposition  • Fever > 100 4 F (38 0 C)    Answer Assessment - Initial Assessment Questions  1  SYMPTOM: "What's the main symptom you're concerned about?" (e g , pain, fever, vomiting)     Shivering/fever   2  ONSET: "When did fever  start?"      1300  3  SURGERY: "What surgery was performed?"    TRANSPERINEALMRI FUSION BIOPSY PROSTATE  4  DATE of SURGERY: "When was surgery performed?"      11/1/202  5  ANESTHESIA: " What type of anesthesia did you have?" (e g , general, spinal, epidural, local)      General   6  PAIN: "Is there any pain?" If Yes, ask: "How bad is it?"  (Scale 1-10; or mild, moderate, severe)      denies  7  FEVER: "Do you have a fever?" If Yes, ask: "What is your temperature, how was it measured, and when did it start?"      Temp 103 7     8  VOMITING: "Is there any vomiting?" If yes, ask: "How many times?"      Denies  9  BLEEDING: "Is there any bleeding?" If Yes, ask: "How much?" and "Where?"      Denies   10   OTHER SYMPTOMS: "Do you have any other symptoms?" (e g , drainage from wound, painful urination, constipation)      Denies    Protocols used: POST-OP SYMPTOMS AND QUESTIONS-[facility]

## 2022-11-01 NOTE — OP NOTE
OPERATIVE REPORT  PATIENT NAME: Theresa Quintero    :  1960  MRN: [id number]  Pt Location: [facility] GI ROOM 01    SURGERY DATE: 2022    Surgeon(s) and Role:     * Adalberto Glaser MD - Primary    Preop Diagnosis:  Elevated prostate specific antigen (PSA) [R97 20]    Post-Op Diagnosis Codes:     * Elevated prostate specific antigen (PSA) [R97 20]    Procedure(s) (LRB):  TRANSPERINEALMRI FUSION BIOPSY PROSTATE (N/A)    Specimen(s):  ID Type Source Tests Collected by Time Destination   1 : R post med x 2 Tissue Prostate TISSUE EXAM Adalberto Glaser MD 2022 0954    2 : R post lat x 2 Tissue Prostate TISSUE EXAM Adalberto Glaser MD 2022 0954    3 : R base x 2 Tissue Prostate TISSUE EXAM Adalberto Glaser MD 2022 0954    4 : L post med x 2 Tissue Prostate TISSUE EXAM Adalberto Glaser MD 2022 0954    5 : L post lat x 2 Tissue Prostate TISSUE EXAM Adalberto Glaser MD 2022 0954    6 : L base x 2 Tissue Prostate TISSUE EXAM Adalberto Glaser MD 2022 0954    7 : L ant med x 2 Tissue Prostate TISSUE EXAM Adalberto Glaser MD 2022 0955    8 : L ant lat x 2 Tissue Prostate TISSUE EXAM Adalberto Glaser MD 2022 0955    9 : R ant lat x 2 Tissue Prostate TISSUE EXAM Adalberto Glaser MD 2022 0955    10 : R ant med x 2 Tissue Prostate TISSUE EXAM Adalberto Glaser MD 2022 0955    11 : R Lat HUONG x 7 Tissue Prostate TISSUE EXAM Adalberto Glaser MD 2022 2632        Estimated Blood Loss:   Minimal    Drains:  Urethral Catheter Straight-tip 16 Fr  (Active)   Number of days: 69       Anesthesia Type:   IV Sedation with Anesthesia    Operative Indications:  Elevated prostate specific antigen (PSA) [R97 20]      Operative Findings:  1  Standard transperineal biopsy, 20 samples  2  Large RIGHT lesion difficult to sample in the lateral border, 7 samples taken  3   Total 27 samples    Complications:   None    Procedure and Technique:    Procedure was transperineal saturation biopsy utilizing the Precision Point perineal access device utilized to map the standard geographic sites of the prostate  Giovanna Grant is a 58 y o  male with history of urinary retention and elevated PSA  He presents after multiparametric MRI was obtained of the prostate  There were lesions concerning so the patient was scheduled for transperineal fusion style biopsy  He was pretreated with outpatient Bactrim and preop gentamycin antibiotics given positive culture  He was met in the prep and hold area and the procedure along with its risks and benefits were discussed and reviewed  Patient signed an informed consent  Transferred to OR  He was placed in dorsal lithotomy with care to pad all pressure points  Daniels removed  His perineum and genitalia were prepped with a ChloraPrep solution  Sterile Iodoband was placed over the perineum above the rectum  Side fire biplanar transrectal ultrasound was performed and measurements of the prostate gland were taken as above  Biplanar transrectal ultrasound was placed in the patient's rectum  With the assistance of the technician, the prior obtained MRI images which had been form added for the abnormal lesions were mapped to the real-time ultrasound  In the midportion of the left and right prostate, a radha was denoted in the perineal skin  Skin wheal was elevated with 5 cc of 2% lidocaine plain on either side  The PrecisionPoint device was then introduced into the left perineal subcutaneous tissue  We visualized the tip of the needle  Spinal needle was introduced through the subcutaneous fat and into the pelvic floor muscle where a perineal pudendal block was performed with additional 5 cc of 2% lidocaine  This was repeated on the patient's right side  Utilizing the [place] [employer] access device, the perineum was access via finder needle   Ultrasound guidance was utilized to place the needle into the lesion that was mass at the large left lateral lesion (ROI1)  Seven specimens taken  The lateral aspect was difficult to fully saturate however the lesion appeared well sampled  We confirmed good position of the needle strikes utilizing the fusion software  On the right side of the prostate, we performed serial biopsies of the right posterior medial peripheral zone, right posterior lateral peripheral zone and moving up the anterior horn to the right anterior lateral and right anteromedial zone  Separate specimen was taken from the right-sided prostate base  2 samples were taken from each geography    The PrecisionPoint device was repositioned into the left perineal stab  The biopsies were undertaken in the same fashion on the left side  Left posterior medial, left posterior lateral, left anterior lateral, left anteromedial and left base  A total of 27 biopsies taken  The transplant rectal ultrasound probe was removed  The Iodoband was retracted  Some gentle pressure was placed on the perineum for approximately 5 minutes  Direct pressure was held for 5 minutes for hemostasis  At the completion of the procedure, the patient was taken out of dorsal lithotomy, extubated and transferred to PACU in good condition  Overall the patient tolerated the procedure and there were no complications  Plan-the patient will return for biopsy pathology review in 2 weeks       I was present for the entire procedure    Patient Disposition:  PACU         SIGNATURE: Ivanna Goodrich MD  DATE: November 1, 2022  TIME: 10:45 AM

## 2022-11-01 NOTE — ED PROVIDER NOTES
History  Chief Complaint   Patient presents with   • Fever - 9 weeks to 74 years     Pt arrives c/o fever 104 earlier, feeling lightheaded, denies cough or CP, reports SOB  Some nausea, denies vomiting or diarrhea  Maryse Garcia comes to the emergency department accompanied by his spouse after recording in elevated temperature at home (T-max 104°) with their home thermometer as well as episodes of rigors and chills at home  Patient states that he underwent a prostate biopsy with his urology team and stated that he had tolerated the procedure well and was discharged home  He states that there were multiple hours following the procedure in which he felt well  However, he stated sudden onset of chills and shaking (bilateral upper extremity started shaking as well as checking of his jaw and profound feeling of chills that required him to put multiple levels of occult on top of him in order to improve his feelings of coldness  Patient stated that this resolved on its own after approximately 30-45 minutes  Patient was provided with discharge information following his procedure that if he was to experience any fevers at home, he should come to be evaluated in the emergency department  Secondary to the presence of fever that was recorded at home, patient wished to come to the emergency department for continued evaluation  Patient states that his breathing and ability to catch his breath is at his baseline but his spouse accounts that the patient is breathing more rapidly and loudly when compared to his baseline        History provided by:  Patient   used: No    Fever - 9 weeks to 74 years  Max temp prior to arrival:  80 F  Temp source:  Oral  Severity:  Mild  Onset quality:  Sudden  Duration:  3 hours  Timing:  Constant  Progression:  Improving  Chronicity:  New  Relieved by:  Nothing  Worsened by:  Nothing  Ineffective treatments:  None tried  Associated symptoms: no chest pain, no chills, no confusion, no congestion, no cough, no diarrhea, no dysuria, no ear pain, no headaches, no myalgias, no nausea, no rash, no rhinorrhea, no somnolence, no sore throat and no vomiting    Risk factors: no recent sickness, no recent travel and no sick contacts        Prior to Admission Medications   Prescriptions Last Dose Informant Patient Reported? Taking?   finasteride (PROSCAR) 5 mg tablet  Self No No   Sig: Take 1 tablet (5 mg total) by mouth daily   lidocaine (XYLOCAINE) 2 % topical gel  Self No No   Sig: Apply topically as needed for mild pain   oxybutynin (DITROPAN XL) 15 MG 24 hr tablet   No No   Sig: Take 1 tablet (15 mg total) by mouth daily at bedtime   sulfamethoxazole-trimethoprim (BACTRIM DS) 800-160 mg per tablet   No No   Sig: Take 1 tablet by mouth 2 (two) times a day for 7 days   tamsulosin (FLOMAX) 0 4 mg  Self No No   Sig: Take 1 capsule (0 4 mg total) by mouth daily with dinner      Facility-Administered Medications: None       Past Medical History:   Diagnosis Date   • Enlarged prostate    • Sleep apnea        Past Surgical History:   Procedure Laterality Date   • APPENDECTOMY     • CLAVICLE FRACTURE REPAIR     • CYST REMOVAL     • HERNIA REPAIR     • UVULECTOMY         Family History   Problem Relation Age of Onset   • Dementia Mother    • Prostate cancer Father      I have reviewed and agree with the history as documented      E-Cigarette/Vaping   • E-Cigarette Use Current Every Day User    • Cartridges/Day 1      E-Cigarette/Vaping Substances   • Nicotine Yes    • THC No    • CBD No    • Flavoring No    • Other No    • Unknown No      Social History     Tobacco Use   • Smoking status: Former Smoker     Types: Cigarettes   • Smokeless tobacco: Never Used   • Tobacco comment: rarely - one a week or so   Vaping Use   • Vaping Use: Every day   • Substances: Nicotine   Substance Use Topics   • Alcohol use: Not Currently     Comment: occ   • Drug use: Yes     Types: Marijuana     Comment: occ Review of Systems   Constitutional: Positive for fever  Negative for chills  HENT: Negative for congestion, ear pain, rhinorrhea and sore throat  Eyes: Negative for pain and visual disturbance  Respiratory: Negative for cough and shortness of breath  Cardiovascular: Negative for chest pain and palpitations  Gastrointestinal: Negative for abdominal pain, diarrhea, nausea and vomiting  Genitourinary: Negative for dysuria and hematuria  Musculoskeletal: Negative for arthralgias, back pain and myalgias  Skin: Negative for color change and rash  Neurological: Negative for seizures, syncope and headaches  Psychiatric/Behavioral: Negative for confusion  All other systems reviewed and are negative  Physical Exam  ED Triage Vitals [11/01/22 1624]   Temperature Pulse Respirations Blood Pressure SpO2   99 7 °F (37 6 °C) (!) 112 (!) 24 118/65 94 %      Temp Source Heart Rate Source Patient Position - Orthostatic VS BP Location FiO2 (%)   Oral Monitor Sitting Right arm --      Pain Score       --             Orthostatic Vital Signs  Vitals:    11/01/22 1900 11/01/22 1915 11/01/22 1930 11/01/22 2016   BP: 107/53 120/56 109/54    Pulse: 91 91 91 92   Patient Position - Orthostatic VS:           Physical Exam  Vitals and nursing note reviewed  Constitutional:       Appearance: He is well-developed  He is obese  He is ill-appearing  He is not diaphoretic  HENT:      Head: Normocephalic and atraumatic  Right Ear: External ear normal       Left Ear: External ear normal       Nose: Nose normal       Mouth/Throat:      Mouth: Mucous membranes are moist       Pharynx: No posterior oropharyngeal erythema  Eyes:      General:         Right eye: No discharge  Left eye: No discharge  Extraocular Movements: Extraocular movements intact  Conjunctiva/sclera: Conjunctivae normal       Pupils: Pupils are equal, round, and reactive to light     Cardiovascular:      Rate and Rhythm: Normal rate and regular rhythm  Pulses: Normal pulses  Heart sounds: Normal heart sounds  No murmur heard  Pulmonary:      Effort: No respiratory distress  Breath sounds: Normal breath sounds  No wheezing, rhonchi or rales  Comments: Patient noted to be tachypneic during evaluation in the emergency department  Patient denies feeling short of breath  Patient is noted to have an oxygen saturation in the low 90s on room air  Abdominal:      General: Abdomen is flat  Palpations: Abdomen is soft  Tenderness: There is no abdominal tenderness  There is no right CVA tenderness, left CVA tenderness, guarding or rebound  Musculoskeletal:         General: No tenderness, deformity or signs of injury  Normal range of motion  Cervical back: Normal range of motion and neck supple  Right lower leg: No edema  Left lower leg: No edema  Skin:     General: Skin is warm and dry  Capillary Refill: Capillary refill takes less than 2 seconds  Coloration: Skin is not jaundiced or pale  Findings: No bruising, erythema or rash  Neurological:      General: No focal deficit present  Mental Status: He is alert and oriented to person, place, and time     Psychiatric:         Mood and Affect: Mood normal          ED Medications  Medications   multi-electrolyte (PLASMALYTE-A/ISOLYTE-S PH 7 4) IV solution 1,000 mL (0 mL Intravenous Stopped 11/1/22 1837)     Followed by   multi-electrolyte (PLASMALYTE-A/ISOLYTE-S PH 7 4) IV solution 1,000 mL (0 mL Intravenous Stopped 11/1/22 1936)     Followed by   multi-electrolyte (PLASMALYTE-A/ISOLYTE-S PH 7 4) IV solution 1,000 mL (1,000 mL Intravenous New Bag 11/1/22 1915)   cefTRIAXone (ROCEPHIN) IVPB (premix in dextrose) 2,000 mg 50 mL (0 mg Intravenous Stopped 11/1/22 1837)       Diagnostic Studies  Results Reviewed     Procedure Component Value Units Date/Time    HS Troponin I 2hr [[id number]]  (Normal) Collected: 11/01/22 1915    Lab Status: Final result Specimen: Blood from Arm, Left Updated: 11/01/22 2008     hs TnI 2hr 24 ng/L      Delta 2hr hsTnI 3 ng/L     Lactic acid 2 Hours [[id number]] Collected: 11/01/22 1932    Lab Status: In process Specimen: Blood from Arm, Left Updated: 11/01/22 1935    Urine Microscopic [[id number]]  (Abnormal) Collected: 11/01/22 1805    Lab Status: Final result Specimen: Urine, Indwelling Daniels Catheter Updated: 11/01/22 1920     RBC, UA Innumerable /hpf      WBC, UA Innumerable /hpf      Epithelial Cells Occasional /hpf      Bacteria, UA None Seen /hpf      MUCUS THREADS Moderate     Hyaline Casts, UA 25-50 /lpf      WBC Clumps Present    Urine culture [[id number]] Collected: 11/01/22 1805    Lab Status: In process Specimen: Urine, Indwelling Daniels Catheter Updated: 11/01/22 1920    UA w Reflex to Microscopic w Reflex to Culture [[id number]]  (Abnormal) Collected: 11/01/22 1805    Lab Status: Final result Specimen: Urine, Indwelling Daniels Catheter Updated: 11/01/22 1916     Color, UA Yellow     Clarity, UA Extra Turbid     Specific Gravity, UA 1 022     pH, UA 5 5     Leukocytes, UA Large     Nitrite, UA Negative     Protein,  (2+) mg/dl      Glucose, UA Negative mg/dl      Ketones, UA Negative mg/dl      Urobilinogen, UA 2 0 mg/dl      Bilirubin, UA Negative     Occult Blood, UA Large    HS Troponin I 4hr [[id number]]     Lab Status: No result Specimen: Blood     Procalcitonin [[id number]]  (Abnormal) Collected: 11/01/22 1740    Lab Status: Final result Specimen: Blood from Arm, Right Updated: 11/01/22 1821     Procalcitonin 19 98 ng/ml     Lactic acid [[id number]]  (Abnormal) Collected: 11/01/22 1740    Lab Status: Final result Specimen: Blood from Arm, Right Updated: 11/01/22 1817     LACTIC ACID 4 4 mmol/L     Narrative:      Result may be elevated if tourniquet was used during collection      CBC and differential [[id number]]  (Normal) Collected: 11/01/22 1644    Lab Status: Final result Specimen: Blood from Arm, Right Updated: 11/01/22 1815     WBC 6 27 Thousand/uL      RBC 4 68 Million/uL      Hemoglobin 13 1 g/dL      Hematocrit 39 8 %      MCV 85 fL      MCH 28 0 pg      MCHC 32 9 g/dL      RDW 14 6 %      MPV 9 7 fL      Platelets 015 Thousands/uL     Narrative: This is an appended report  These results have been appended to a previously verified report  Manual Differential(PHLEBS Do Not Order) [[id number]]  (Abnormal) Collected: 11/01/22 1644    Lab Status: Final result Specimen: Blood from Arm, Right Updated: 11/01/22 1815     Segmented % 69 %      Bands % 18 %      Lymphocytes % 5 %      Monocytes % 0 %      Eosinophils, % 0 %      Basophils % 1 %      Metamyelocytes% 3 %      Myelocytes % 1 %      Atypical Lymphocytes % 3 %      Absolute Neutrophils 5 45 Thousand/uL      Lymphocytes Absolute 0 31 Thousand/uL      Monocytes Absolute 0 00 Thousand/uL      Eosinophils Absolute 0 00 Thousand/uL      Basophils Absolute 0 06 Thousand/uL      Total Counted --     RBC Morphology Normal     Platelet Estimate Adequate    APTT [[id number]]  (Normal) Collected: 11/01/22 1752    Lab Status: Final result Specimen: Blood from Arm, Left Updated: 11/01/22 1814     PTT 29 seconds     Protime-INR [[id number]]  (Abnormal) Collected: 11/01/22 1752    Lab Status: Final result Specimen: Blood from Arm, Left Updated: 11/01/22 1814     Protime 14 6 seconds      INR 1 12    Blood gas, venous [[id number]]  (Abnormal) Collected: 11/01/22 1752    Lab Status: Final result Specimen: Blood from Arm, Left Updated: 11/01/22 1802     pH, Carlos 7 375     pCO2, Carlos 38 3 mm Hg      pO2, Carlos 39 2 mm Hg      HCO3, Carlos 21 9 mmol/L      Base Excess, Carlos -2 9 mmol/L      O2 Content, Carlos 13 3 ml/dL      O2 HGB, VENOUS 69 1 %     Blood culture #1 [[id number]] Collected: 11/01/22 1752    Lab Status: In process Specimen: Blood from Arm, Left Updated: 11/01/22 1759    Blood culture #2 [[id number]] Collected: 11/01/22 1740    Lab Status:  In process Specimen: Blood from Line, Venous Updated: 11/01/22 1746    HS Troponin 0hr (reflex protocol) [[id number]]  (Normal) Collected: 11/01/22 1644    Lab Status: Final result Specimen: Blood from Arm, Right Updated: 11/01/22 1724     hs TnI 0hr 21 ng/L     Comprehensive metabolic panel [[id number]]  (Abnormal) Collected: 11/01/22 1644    Lab Status: Final result Specimen: Blood from Arm, Right Updated: 11/01/22 1717     Sodium 132 mmol/L      Potassium 4 1 mmol/L      Chloride 100 mmol/L      CO2 20 mmol/L      ANION GAP 12 mmol/L      BUN 27 mg/dL      Creatinine 1 68 mg/dL      Glucose 101 mg/dL      Calcium 9 2 mg/dL      AST 23 U/L      ALT 31 U/L      Alkaline Phosphatase 66 U/L      Total Protein 7 8 g/dL      Albumin 4 3 g/dL      Total Bilirubin 0 82 mg/dL      eGFR 42 ml/min/1 73sq m     Narrative:      [place] guidelines for Chronic Kidney Disease (CKD):   •  Stage 1 with normal or high GFR (GFR > 90 mL/min/1 73 square meters)  •  Stage 2 Mild CKD (GFR = 60-89 mL/min/1 73 square meters)  •  Stage 3A Moderate CKD (GFR = 45-59 mL/min/1 73 square meters)  •  Stage 3B Moderate CKD (GFR = 30-44 mL/min/1 73 square meters)  •  Stage 4 Severe CKD (GFR = 15-29 mL/min/1 73 square meters)  •  Stage 5 End Stage CKD (GFR <15 mL/min/1 73 square meters)  Note: GFR calculation is accurate only with a steady state creatinine                 XR chest 1 view portable   ED Interpretation by Joshua No MD (11/01 1807)   No acute intrathoracic pathology appreciated on evaluation radiograph              Procedures  ECG 12 Lead Documentation Only    Date/Time: 11/1/2022 7:44 PM  Performed by: Joshua No MD  Authorized by: Joshua No MD     Patient location:  ED  Previous ECG:     Previous ECG:  Unavailable    Comparison to cardiac monitor: Yes    Interpretation:     Interpretation: abnormal    Quality:     Tracing quality:  Limited by artifact  Rate:     ECG rate: 108    ECG rate assessment: tachycardic    Rhythm:     Rhythm: sinus tachycardia    Ectopy:     Ectopy: none    QRS:     QRS axis:  Normal    QRS intervals:  Normal  Conduction:     Conduction: abnormal      Abnormal conduction: incomplete RBBB    ST segments:     ST segments:  Normal  T waves:     T waves: normal      CriticalCare Time  Performed by: Mode Krishna MD  Authorized by: Mode Krishna MD     Critical care provider statement:     Critical care time (minutes):  30    Critical care start time:  11/1/2022 7:00 PM    Critical care end time:  11/1/2022 7:30 PM    Critical care time was exclusive of:  Separately billable procedures and treating other patients and teaching time    Critical care was necessary to treat or prevent imminent or life-threatening deterioration of the following conditions:  Metabolic crisis and sepsis    Critical care was time spent personally by me on the following activities:  Blood draw for specimens, obtaining history from patient or surrogate, development of treatment plan with patient or surrogate, discussions with consultants, discussions with primary provider, evaluation of patient's response to treatment, examination of patient, review of old charts, re-evaluation of patient's condition, ordering and review of radiographic studies, ordering and review of laboratory studies and ordering and performing treatments and interventions    I assumed direction of critical care for this patient from another provider in my specialty: no            ED Course                          Initial Sepsis Screening     Row Name 11/01/22 1906 11/01/22 1747             Is the patient's history suggestive of a new or worsening infection?  Yes (Proceed)  -CK Yes (Proceed)  -CK       Suspected source of infection suspect infection, source unknown  -CK suspect infection, source unknown  -CK       Are two or more of the following signs & symptoms of infection both present and new to the patient? Yes (Proceed)  -CK Yes (Proceed)  -CK       Indicate SIRS criteria Tachycardia > 90 bpm;Tachypnea > 20 resp per min;WBC > 10% bands  -CK Tachycardia > 90 bpm;Tachypnea > 20 resp per min  -CK       If the answer is yes to both questions, suspicion of sepsis is present -- --       If severe sepsis is present AND tissue hypoperfusion perists in the hour after fluid resuscitation or lactate > 4, the patient meets criteria for SEPTIC SHOCK -- --       Are any of the following organ dysfunction criteria present within 6 hours of suspected infection and SIRS criteria that are NOT considered to be chronic conditions? Yes  -CK No  -CK       Organ dysfunction Lactate > 2 0 mmol/L  -CK --       Date of presentation of severe sepsis 11/01/22  -CK --       Time of presentation of severe sepsis 1900  -CK --       Tissue hypoperfusion persists in the hour after crystalloid fluid administration, evidenced, by either: -- --       Was hypotension present within one hour of the conclusion of crystalloid fluid administration? -- --       Date of presentation of septic shock -- --       Time of presentation of septic shock -- --             User Key  (r) = Recorded By, (t) = Taken By, (c) = Cosigned By    [address] Name Provider Type    CK Blair Avila MD Resident                          MDM  Number of Diagnoses or Management Options  Fever: new and requires workup  Severe sepsis [facility]): new and requires workup  Tachypnea: new and requires workup  Diagnosis management comments: Myrna Camarena comes emergency department after procedure (prostate biopsy) and meets SIRS criteria for sepsis and had laboratory evaluations that met criteria for severe sepsis (elevated white count, bandemia, and elevated lactate)  Initial laboratory studies were collected and sent in the emergency department secondary to the patient's initial elevated heart rate as well as respiratory rate    Patient was maintained on continuous pulse oximetry as well as cardiac monitoring for his symptoms  Based off of meeting SIRS criteria, patient was initiated on fluid therapy based off of ideal body weight and was started on initial dosage of ceftriaxone  Secondary to meeting criteria for severe sepsis, it was decided the patient would be admitted for continued evaluation for potential source of fever, fluid hydration, antibiotic therapy  This case was discussed with the on-call urology team who agreed with admission at this campus as opposed to transfer for admission to their service  This case was discussed with the inpatient medical team who agreed with inpatient admission for continued evaluation in the setting of severe sepsis  Disposition:  Inpatient admission for continued management of severe sepsis with fluids and IV antibiotics  Disposition  Final diagnoses:   Severe sepsis ([address] )   Fever   Tachypnea     Time reflects when diagnosis was documented in both MDM as applicable and the Disposition within this note     Time User Action Codes Description Comment    11/1/2022  7:04 PM Patric Sherman [A41 9,  R65 20] Severe sepsis ([address] )     11/1/2022  7:04 PM Patric Sherman [R50 9] Fever     11/1/2022  7:05 PM Patric Ash Add [R06 82] Tachypnea       ED Disposition     ED Disposition   Admit    Condition   Stable    Date/Time   Tue Nov 1, 2022  7:04 PM    Comment   Case was discussed with Dr Dunaway Patient and the patient's admission status was agreed to be Admission Status: inpatient status to the service of Dr Silvia Garcia             Follow-up Information     Follow up With Specialties Details Why Contact Info Additional Information    [facility] [facility] Emergency Medicine  As needed, If symptoms worsen [address] Emergency Department, [address], [place], [place], [address] JANICE [facility], Nurse Practitioner   [address]  [address]  [phone]             Current Discharge Medication List      CONTINUE these medications which have NOT CHANGED    Details   finasteride (PROSCAR) 5 mg tablet Take 1 tablet (5 mg total) by mouth daily  Qty: 90 tablet, Refills: 3    Associated Diagnoses: Urinary retention due to benign prostatic hyperplasia      lidocaine (XYLOCAINE) 2 % topical gel Apply topically as needed for mild pain  Qty: 30 mL, Refills: 0    Associated Diagnoses: Urinary retention      oxybutynin (DITROPAN XL) 15 MG 24 hr tablet Take 1 tablet (15 mg total) by mouth daily at bedtime  Qty: 30 tablet, Refills: 0    Associated Diagnoses: Bladder spasms      sulfamethoxazole-trimethoprim (BACTRIM DS) 800-160 mg per tablet Take 1 tablet by mouth 2 (two) times a day for 7 days  Qty: 14 tablet, Refills: 0    Associated Diagnoses: Elevated PSA; Acute cystitis without hematuria      tamsulosin (FLOMAX) 0 4 mg Take 1 capsule (0 4 mg total) by mouth daily with dinner  Qty: 90 capsule, Refills: 3    Associated Diagnoses: Urinary retention           No discharge procedures on file  PDMP Review     None           ED Provider  Attending physically available and evaluated Abhilash Dengjustin MALONEY managed the patient along with the ED Attending      Electronically Signed by         Italia Anthony MD  11/01/22 2017

## 2022-11-01 NOTE — SEPSIS NOTE
Sepsis Note   Stacy Scott 58 y o  male MRN: [id number]  Unit/Bed#: ED-43 Encounter: [id number]       qSOFA     [address] Name 11/01/22 1830 11/01/22 1624             Altered mental status GCS < 15 -- --       Respiratory Rate > / =04 1 1       Systolic BP < / =312 0 0       Q Sofa Score 1 1                  Initial Sepsis Screening     Row Name 11/01/22 1906 11/01/22 1747             Is the patient's history suggestive of a new or worsening infection? Yes (Proceed)  -CK Yes (Proceed)  -CK       Suspected source of infection suspect infection, source unknown  -CK suspect infection, source unknown  -CK       Are two or more of the following signs & symptoms of infection both present and new to the patient? Yes (Proceed)  -CK Yes (Proceed)  -CK       Indicate SIRS criteria Tachycardia > 90 bpm;Tachypnea > 20 resp per min;WBC > 10% bands  -CK Tachycardia > 90 bpm;Tachypnea > 20 resp per min  -CK       If the answer is yes to both questions, suspicion of sepsis is present -- --       If severe sepsis is present AND tissue hypoperfusion perists in the hour after fluid resuscitation or lactate > 4, the patient meets criteria for SEPTIC SHOCK -- --       Are any of the following organ dysfunction criteria present within 6 hours of suspected infection and SIRS criteria that are NOT considered to be chronic conditions?  Yes  -CK No  -CK       Organ dysfunction Lactate > 2 0 mmol/L  -CK --       Date of presentation of severe sepsis 11/01/22  -CK --       Time of presentation of severe sepsis 1900  -CK --       Tissue hypoperfusion persists in the hour after crystalloid fluid administration, evidenced, by either: -- --       Was hypotension present within one hour of the conclusion of crystalloid fluid administration? -- --       Date of presentation of septic shock -- --       Time of presentation of septic shock -- --             User Key  (r) = Recorded By, (t) = Taken By, (c) = Cosigned By    [address] Name Provider Type Abner Milan MD Resident

## 2022-11-01 NOTE — ED ATTENDING ATTESTATION
11/1/2022  I, Marlene Stanford MD, saw and evaluated the patient  I have discussed the patient with the resident/non-physician practitioner and agree with the resident's/non-physician practitioner's findings, Plan of Care, and MDM as documented in the resident's/non-physician practitioner's note, except where noted  All available labs and Radiology studies were reviewed  I was present for key portions of any procedure(s) performed by the resident/non-physician practitioner and I was immediately available to provide assistance  At this point I agree with the current assessment done in the Emergency Department  I have conducted an independent evaluation of this patient a history and physical is as follows:    S:  Chief Complaint   Patient presents with   • Fever - 9 weeks to 74 years     Pt arrives c/o fever 104 earlier, feeling lightheaded, denies cough or CP, reports SOB  Some nausea, denies vomiting or diarrhea  Rosita Robertson is a 58 y o  male who presents with the chief complaint of fever and chills at home with a tmax of 103  He had a prostate biopsy earlier today and was instructed to get checked out if he spiked any fevers  He presents with a temp of 99 7, heart rate of 112 and mild tachypnea  He reports that he has been suffering with urinary retention and other symptoms for several months and has had several cox catheters  He reports the catheter was changed today  O:  ED Triage Vitals [11/01/22 1624]   Temperature Pulse Respirations Blood Pressure SpO2   99 7 °F (37 6 °C) (!) 112 (!) 24 118/65 94 %      Temp Source Heart Rate Source Patient Position - Orthostatic VS BP Location FiO2 (%)   Oral Monitor Sitting Right arm --      Pain Score       --         Physical Exam  Vitals and nursing note reviewed  Constitutional:       General: He is not in acute distress  Appearance: He is well-developed  He is obese  HENT:      Head: Normocephalic and atraumatic     Eyes:      Extraocular Movements: Extraocular movements intact  Pupils: Pupils are equal, round, and reactive to light  Neck:      Vascular: No JVD  Cardiovascular:      Rate and Rhythm: Regular rhythm  Tachycardia present  Heart sounds: Normal heart sounds  No murmur heard  No friction rub  No gallop  Pulmonary:      Effort: Pulmonary effort is normal  No respiratory distress  Breath sounds: Normal breath sounds  No wheezing or rales  Chest:      Chest wall: No tenderness  Musculoskeletal:         General: No tenderness  Normal range of motion  Cervical back: Normal range of motion  Skin:     General: Skin is warm and dry  Neurological:      General: No focal deficit present  Mental Status: He is alert and oriented to person, place, and time  Psychiatric:         Behavior: Behavior normal          Thought Content: Thought content normal          Judgment: Judgment normal          A/P:  Background: 58 y o  male presents with fever s/p prostate biopsy    Differential DX includes but is not limited to: uti, bacteremia, doubt URI although he was tachypneic upon presentation     Plan: cbc, cmp, blood cx, pt/inr, lactic acid, chest xray, urinalysis - likely admission       ED Course     Labs Reviewed   COMPREHENSIVE METABOLIC PANEL - Abnormal       Result Value Ref Range Status    Sodium 132 (*) 135 - 147 mmol/L Final    Potassium 4 1  3 5 - 5 3 mmol/L Final    Chloride 100  96 - 108 mmol/L Final    CO2 20 (*) 21 - 32 mmol/L Final    ANION GAP 12  4 - 13 mmol/L Final    BUN 27 (*) 5 - 25 mg/dL Final    Creatinine 1 68 (*) 0 60 - 1 30 mg/dL Final    Comment: Standardized to IDMS reference method    Glucose 101  65 - 140 mg/dL Final    Comment: If the patient is fasting, the ADA then defines impaired fasting glucose as > 100 mg/dL and diabetes as > or equal to 123 mg/dL  Specimen collection should occur prior to Sulfasalazine administration due to the potential for falsely depressed results   Specimen collection should occur prior to Sulfapyridine administration due to the potential for falsely elevated results  Calcium 9 2  8 4 - 10 2 mg/dL Final    AST 23  13 - 39 U/L Final    Comment: Specimen collection should occur prior to Sulfasalazine administration due to the potential for falsely depressed results  ALT 31  7 - 52 U/L Final    Comment: Specimen collection should occur prior to Sulfasalazine administration due to the potential for falsely depressed results  Alkaline Phosphatase 66  34 - 104 U/L Final    Total Protein 7 8  6 4 - 8 4 g/dL Final    Albumin 4 3  3 5 - 5 0 g/dL Final    Total Bilirubin 0 82  0 20 - 1 00 mg/dL Final    eGFR 42  ml/min/1 73sq m Final    Narrative:     [place] guidelines for Chronic Kidney Disease (CKD):   •  Stage 1 with normal or high GFR (GFR > 90 mL/min/1 73 square meters)  •  Stage 2 Mild CKD (GFR = 60-89 mL/min/1 73 square meters)  •  Stage 3A Moderate CKD (GFR = 45-59 mL/min/1 73 square meters)  •  Stage 3B Moderate CKD (GFR = 30-44 mL/min/1 73 square meters)  •  Stage 4 Severe CKD (GFR = 15-29 mL/min/1 73 square meters)  •  Stage 5 End Stage CKD (GFR <15 mL/min/1 73 square meters)  Note: GFR calculation is accurate only with a steady state creatinine   LACTIC ACID, PLASMA - Abnormal    LACTIC ACID 4 4 (*) 0 5 - 2 0 mmol/L Final    Narrative:     Result may be elevated if tourniquet was used during collection  PROCALCITONIN TEST - Abnormal    Procalcitonin 19 98 (*) <=0 25 ng/ml Final    Comment: Suspected Lower Respiratory Tract Infection (LRTI):  - LESS than or EQUAL to 0 25 ng/mL:   low likelihood for bacterial LRTI; antibiotics DISCOURAGED   - GREATER than 0 25 ng/mL:   increased likelihood for bacterial LRTI; antibiotics ENCOURAGED  Suspected Sepsis:  - Strongly consider initiating antibiotics in ALL UNSTABLE patients  - LESS than or EQUAL to 0 5 ng/mL:   low likelihood for bacterial sepsis; antibiotics DISCOURAGED    - GREATER than 0 5 ng/mL:   increased likelihood for bacterial sepsis; antibiotics ENCOURAGED   - GREATER than 2 ng/mL:   high risk for severe sepsis / septic shock; antibiotics strongly ENCOURAGED  Decisions on antibiotic use should not be based solely on Procalcitonin (PCT) levels  If PCT is low but uncertainty exists with stopping antibiotics, repeat PCT in 6-24 hours to confirm the low level  If antibiotics are administered (regardless if initial PCT was high or low), repeat PCT every 1-2 days to consider early antibiotic cessation (when GREATER than 80% decrease from the peak OR when PCT drops below designated cutoffs, whichever comes first), so long as the infection is NOT one that typically requires prolonged treatment durations (e g , bone/joint infections, endocarditis, Staph  aureus bacteremia)      Situations of FALSE-POSITIVE Procalcitonin values:  1) Newborns < 67 hours old  2) Massive stress from severe trauma / burns, major surgery, acute pancreatitis, cardiogenic / hemorrhagic shock, sickle cell crisis, or other organ perfusion abnormalities  3) Malaria and some Candidal infections  4) Treatment with agents that stimulate cytokines (e g , OKT3, anti-lymphocyte globulins, alemtuzumab, IL-2, granulocyte transfusion [NOT GCSFs])  5) Chronic renal disease causes elevated baseline levels (consider GREATER than 0 75 ng/mL as an abnormal cut-off); initiating HD/CRRT may cause transient decreases  6) Paraneoplastic syndromes from medullary thyroid or SCLC, some forms of vasculitis, and acute [id number] disease    Situations of FALSE-NEGATIVE Procalcitonin values:  1) Too early in clinical course for PCT to have reached its peak (may repeat in 6-24 hours to confirm low level)  2) Localized infection WITHOUT systemic (SIRS / sepsis) response (e g , an abscess, osteomyelitis, cystitis)  3) Mycobacteria (e g , Tuberculosis, MAC)  4) Cystic fibrosis exacerbations     PROTIME-INR - Abnormal    Protime 14 6 (*) 11 6 - 14 5 seconds Final    INR 1 12  0 84 - 1 19 Final   BLOOD GAS, VENOUS - Abnormal    pH, Carlos 7 375  7 300 - 7 400 Final    pCO2, Carlos 38 3 (*) 42 0 - 50 0 mm Hg Final    pO2, Carlos 39 2  35 0 - 45 0 mm Hg Final    HCO3, Carlos 21 9 (*) 24 - 30 mmol/L Final    Base Excess, Carlos -2 9  mmol/L Final    O2 Content, Carlos 13 3  ml/dL Final    O2 HGB, VENOUS 69 1  60 0 - 80 0 % Final   MANUAL DIFFERENTIAL(PHLEBS DO NOT ORDER) - Abnormal    Segmented % 69  43 - 75 % Final    Bands % 18 (*) 0 - 8 % Final    Lymphocytes % 5 (*) 14 - 44 % Final    Monocytes % 0 (*) 4 - 12 % Final    Eosinophils, % 0  0 - 6 % Final    Basophils % 1  0 - 1 % Final    Metamyelocytes% 3 (*) 0 - 1 % Final    Myelocytes % 1  0 - 1 % Final    Atypical Lymphocytes % 3 (*) <=0 % Final    Absolute Neutrophils 5 45  1 85 - 7 62 Thousand/uL Final    Lymphocytes Absolute 0 31 (*) 0 60 - 4 47 Thousand/uL Final    Monocytes Absolute 0 00  0 00 - 1 22 Thousand/uL Final    Eosinophils Absolute 0 00  0 00 - 0 40 Thousand/uL Final    Basophils Absolute 0 06  0 00 - 0 10 Thousand/uL Final    Total Counted     Final    RBC Morphology Normal   Final    Platelet Estimate Adequate  Adequate Final   CBC AND DIFFERENTIAL - Normal    WBC 6 27  4 31 - 10 16 Thousand/uL Final    RBC 4 68  3 88 - 5 62 Million/uL Final    Hemoglobin 13 1  12 0 - 17 0 g/dL Final    Hematocrit 39 8  36 5 - 49 3 % Final    MCV 85  82 - 98 fL Final    MCH 28 0  26 8 - 34 3 pg Final    MCHC 32 9  31 4 - 37 4 g/dL Final    RDW 14 6  11 6 - 15 1 % Final    MPV 9 7  8 9 - 12 7 fL Final    Platelets 443  079 - 390 Thousands/uL Final    Narrative: This is an appended report  These results have been appended to a previously verified report     HS TROPONIN I 0HR - Normal    hs TnI 0hr 21  "Refer to ACS Flowchart"- see link ng/L Final    Comment:                                              Initial (time 0) result  If >=50 ng/L, Myocardial injury suggested ;  Type of myocardial injury and treatment strategy  to be determined  If 5-49 ng/L, a delta result at 2 hours and or 4 hours will be needed to further evaluate  If <4 ng/L, and chest pain has been >3 hours since onset, patient may qualify for discharge based on the HEART score in the ED  If <5 ng/L and <3hours since onset of chest pain, a delta result at 2 hours will be needed to further evaluate  HS Troponin 99th Percentile URL of a Health Population=12 ng/L with a 95% Confidence Interval of 8-18 ng/L  Second Troponin (time 2 hours)  If calculated delta >= 20 ng/L,  Myocardial injury suggested ; Type of myocardial injury and treatment strategy to be determined  If 5-49 ng/L and the calculated delta is 5-19 ng/L, consult medical service for evaluation  Continue evaluation for ischemia on ecg and other possible etiology and repeat hs troponin at 4 hours  If delta is <5 ng/L at 2 hours, consider discharge based on risk stratification via the HEART score (if in ED), or MEETA risk score in IP/Observation  HS Troponin 99th Percentile URL of a Health Population=12 ng/L with a 95% Confidence Interval of 8-18 ng/L  APTT - Normal    PTT 29  23 - 37 seconds Final    Comment: Therapeutic Heparin Range =  60-90 seconds   BLOOD CULTURE   BLOOD CULTURE   UA W REFLEX TO MICROSCOPIC WITH REFLEX TO CULTURE   HS TROPONIN I 2HR   LACTIC ACID 2 HOUR   HS TROPONIN I 4HR   LIGHT BLUE TOP     XR chest 1 view portable   ED Interpretation   No acute intrathoracic pathology appreciated on evaluation radiograph              Critical Care Time  CriticalCare Time  Performed by: Jessica Wu MD  Authorized by: Jessica Wu MD     Critical care provider statement:     Critical care time (minutes):  30    Critical care time was exclusive of:  Separately billable procedures and treating other patients and teaching time    Critical care was necessary to treat or prevent imminent or life-threatening deterioration of the following conditions:  Sepsis Critical care was time spent personally by me on the following activities:  Blood draw for specimens, obtaining history from patient or surrogate, development of treatment plan with patient or surrogate, evaluation of patient's response to treatment, examination of patient, interpretation of cardiac output measurements, ordering and performing treatments and interventions, ordering and review of laboratory studies, re-evaluation of patient's condition, review of old charts and ordering and review of radiographic studies        Time reflects when diagnosis was documented in both MDM as applicable and the Disposition within this note     Time User Action Codes Description Comment    11/1/2022  7:04 PM Caryn Police Add [A41 9,  R65 20] Severe sepsis ([address] )     11/1/2022  7:04 PM Caryn Police Add [R50 9] Fever     11/1/2022  7:05 PM Iris Mobile Add [R06 82] Tachypnea       ED Disposition     ED Disposition   Admit    Condition   Stable    Date/Time   Tue Nov 1, 2022  7:04 PM    Comment   Case was discussed with Dr Mendoza Hays and the patient's admission status was agreed to be Admission Status: inpatient status to the service of Dr Mike Colin             Follow-up Information     Follow up With Specialties Details Why Contact Info Additional Information    [address] Emergency Department Emergency Medicine  As needed, If symptoms worsen [facility] [id number] [place] [address] Emergency Department, [facility], [address] [place], [place] [address], [address] [facility], Nurse Practitioner   [address]  [address]  [phone]

## 2022-11-01 NOTE — ANESTHESIA POSTPROCEDURE EVALUATION
Post-Op Assessment Note    CV Status:  Stable    Pain management: adequate     Mental Status:  Alert and awake   Hydration Status:  Euvolemic   PONV Controlled:  Controlled   Airway Patency:  Patent      Post Op Vitals Reviewed: Yes            No complications documented      BP   113/71   Temp   98 1   Pulse  69   Resp   18   SpO2   93

## 2022-11-01 NOTE — ANESTHESIA PREPROCEDURE EVALUATION
Procedure:  TRANSPERINEALMRI FUSION BIOPSY PROSTATE (N/A Perineum)    Relevant Problems   CARDIO   (+) Hyperlipidemia      PULMONARY   (+) Sleep apnea      No CPAP  Patient states that he does not have HLD    Physical Exam    Airway  Comment: Small mouth  Mallampati score: III  TM Distance: <3 FB  Neck ROM: full     Dental   Comment: None loose, No notable dental hx     Cardiovascular      Pulmonary      Other Findings        Anesthesia Plan  ASA Score- 3     Anesthesia Type- IV sedation with anesthesia with ASA Monitors  Additional Monitors:   Airway Plan:     Comment: NPO after MN  Plan Factors-Exercise tolerance (METS): <4 METS  Chart reviewed  Patient summary reviewed  Patient is a current smoker (vapes)  Patient instructed to abstain from smoking on day of procedure  Patient smoked on day of surgery  Obstructive sleep apnea risk education given perioperatively  Induction- intravenous  Postoperative Plan-     Informed Consent- Anesthetic plan and risks discussed with patient  I personally reviewed this patient with the CRNA  Discussed and agreed on the Anesthesia Plan with the CRNA  Jaciel Lizama

## 2022-11-02 ENCOUNTER — TELEPHONE (OUTPATIENT)
Dept: UROLOGY | Facility: CLINIC | Age: 62
End: 2022-11-02

## 2022-11-02 PROBLEM — N17.9 AKI (ACUTE KIDNEY INJURY) (HCC): Status: ACTIVE | Noted: 2022-11-02

## 2022-11-02 PROBLEM — A41.9 SEPSIS (HCC): Status: ACTIVE | Noted: 2022-11-02

## 2022-11-02 PROBLEM — E87.1 HYPONATREMIA: Status: ACTIVE | Noted: 2022-11-02

## 2022-11-02 PROBLEM — R50.9 FEVER AND CHILLS: Status: ACTIVE | Noted: 2022-11-02

## 2022-11-02 LAB
ALBUMIN SERPL BCP-MCNC: 3.8 G/DL (ref 3.5–5)
ALP SERPL-CCNC: 49 U/L (ref 34–104)
ALT SERPL W P-5'-P-CCNC: 24 U/L (ref 7–52)
ANION GAP SERPL CALCULATED.3IONS-SCNC: 11 MMOL/L (ref 4–13)
AST SERPL W P-5'-P-CCNC: 22 U/L (ref 13–39)
BACTERIA UR CULT: NORMAL
BASOPHILS # BLD MANUAL: 0.11 THOUSAND/UL (ref 0–0.1)
BASOPHILS NFR MAR MANUAL: 1 % (ref 0–1)
BILIRUB SERPL-MCNC: 0.64 MG/DL (ref 0.2–1)
BUN SERPL-MCNC: 30 MG/DL (ref 5–25)
CALCIUM SERPL-MCNC: 8.7 MG/DL (ref 8.4–10.2)
CHLORIDE SERPL-SCNC: 101 MMOL/L (ref 96–108)
CO2 SERPL-SCNC: 20 MMOL/L (ref 21–32)
CREAT SERPL-MCNC: 1.64 MG/DL (ref 0.6–1.3)
EOSINOPHIL # BLD MANUAL: 0 THOUSAND/UL (ref 0–0.4)
EOSINOPHIL NFR BLD MANUAL: 0 % (ref 0–6)
ERYTHROCYTE [DISTWIDTH] IN BLOOD BY AUTOMATED COUNT: 14.9 % (ref 11.6–15.1)
FLUAV RNA RESP QL NAA+PROBE: NEGATIVE
FLUBV RNA RESP QL NAA+PROBE: NEGATIVE
GFR SERPL CREATININE-BSD FRML MDRD: 44 ML/MIN/1.73SQ M
GLUCOSE SERPL-MCNC: 94 MG/DL (ref 65–140)
HCT VFR BLD AUTO: 35.5 % (ref 36.5–49.3)
HGB BLD-MCNC: 11.3 G/DL (ref 12–17)
LACTATE SERPL-SCNC: 1.3 MMOL/L (ref 0.5–2)
LACTATE SERPL-SCNC: 2.1 MMOL/L (ref 0.5–2)
LYMPHOCYTES # BLD AUTO: 0.57 THOUSAND/UL (ref 0.6–4.47)
LYMPHOCYTES # BLD AUTO: 5 % (ref 14–44)
MCH RBC QN AUTO: 27.6 PG (ref 26.8–34.3)
MCHC RBC AUTO-ENTMCNC: 31.8 G/DL (ref 31.4–37.4)
MCV RBC AUTO: 87 FL (ref 82–98)
MONOCYTES # BLD AUTO: 0.46 THOUSAND/UL (ref 0–1.22)
MONOCYTES NFR BLD: 4 % (ref 4–12)
NEUTROPHILS # BLD MANUAL: 10.29 THOUSAND/UL (ref 1.85–7.62)
NEUTS BAND NFR BLD MANUAL: 13 % (ref 0–8)
NEUTS SEG NFR BLD AUTO: 77 % (ref 43–75)
PLATELET # BLD AUTO: 156 THOUSANDS/UL (ref 149–390)
PLATELET BLD QL SMEAR: ABNORMAL
PMV BLD AUTO: 9.7 FL (ref 8.9–12.7)
POTASSIUM SERPL-SCNC: 4.4 MMOL/L (ref 3.5–5.3)
PROCALCITONIN SERPL-MCNC: 59.4 NG/ML
PROT SERPL-MCNC: 6.9 G/DL (ref 6.4–8.4)
RBC # BLD AUTO: 4.1 MILLION/UL (ref 3.88–5.62)
RBC MORPH BLD: NORMAL
RSV RNA RESP QL NAA+PROBE: NEGATIVE
SARS-COV-2 RNA RESP QL NAA+PROBE: NEGATIVE
SODIUM SERPL-SCNC: 132 MMOL/L (ref 135–147)
WBC # BLD AUTO: 11.43 THOUSAND/UL (ref 4.31–10.16)
WBC TOXIC VACUOLES BLD QL SMEAR: PRESENT

## 2022-11-02 RX ORDER — AMOXICILLIN 250 MG
1 CAPSULE ORAL DAILY PRN
Status: DISCONTINUED | OUTPATIENT
Start: 2022-11-02 | End: 2022-11-04 | Stop reason: HOSPADM

## 2022-11-02 RX ADMIN — HEPARIN SODIUM 5000 UNITS: 5000 INJECTION INTRAVENOUS; SUBCUTANEOUS at 20:26

## 2022-11-02 RX ADMIN — Medication 3 MG: at 20:26

## 2022-11-02 RX ADMIN — TAMSULOSIN HYDROCHLORIDE 0.4 MG: 0.4 CAPSULE ORAL at 17:21

## 2022-11-02 RX ADMIN — HEPARIN SODIUM 5000 UNITS: 5000 INJECTION INTRAVENOUS; SUBCUTANEOUS at 05:26

## 2022-11-02 RX ADMIN — ERTAPENEM SODIUM 1000 MG: 1 INJECTION, POWDER, LYOPHILIZED, FOR SOLUTION INTRAMUSCULAR; INTRAVENOUS at 21:47

## 2022-11-02 RX ADMIN — ACETAMINOPHEN 650 MG: 325 TABLET ORAL at 21:27

## 2022-11-02 RX ADMIN — FINASTERIDE 5 MG: 5 TABLET, FILM COATED ORAL at 08:31

## 2022-11-02 RX ADMIN — OXYBUTYNIN CHLORIDE 15 MG: 5 TABLET, EXTENDED RELEASE ORAL at 20:26

## 2022-11-02 RX ADMIN — SODIUM CHLORIDE, SODIUM GLUCONATE, SODIUM ACETATE, POTASSIUM CHLORIDE, MAGNESIUM CHLORIDE, SODIUM PHOSPHATE, DIBASIC, AND POTASSIUM PHOSPHATE 75 ML/HR: .53; .5; .37; .037; .03; .012; .00082 INJECTION, SOLUTION INTRAVENOUS at 13:29

## 2022-11-02 RX ADMIN — NICOTINE 14 MG: 14 PATCH, EXTENDED RELEASE TRANSDERMAL at 08:31

## 2022-11-02 RX ADMIN — HEPARIN SODIUM 5000 UNITS: 5000 INJECTION INTRAVENOUS; SUBCUTANEOUS at 13:25

## 2022-11-02 RX ADMIN — SENNOSIDES AND DOCUSATE SODIUM 1 TABLET: 8.6; 5 TABLET ORAL at 20:26

## 2022-11-02 NOTE — UTILIZATION REVIEW
Initial Clinical Review    Admission: Date/Time/Statement:   Admission Orders (From admission, onward)     Ordered        11/01/22 1904  INPATIENT ADMISSION  Once                      Orders Placed This Encounter   Procedures   • INPATIENT ADMISSION     Standing Status:   Standing     Number of Occurrences:   1     Order Specific Question:   Level of Care     Answer:   Med Surg [16]     Order Specific Question:   Estimated length of stay     Answer:   More than 2 Midnights     Order Specific Question:   Certification     Answer:   I certify that inpatient services are medically necessary for this patient for a duration of greater than two midnights  See H&P and MD Progress Notes for additional information about the patient's course of treatment  ED Arrival Information     Expected   -    Arrival   11/1/2022 16:12    Acuity   Urgent            Means of arrival   Walk-In    Escorted by   Family Member    Service   Hospitalist    Admission type   Emergency            Arrival complaint   FEVER           Chief Complaint   Patient presents with   • Fever - 9 weeks to 74 years     Pt arrives c/o fever 104 earlier, feeling lightheaded, denies cough or CP, reports SOB  Some nausea, denies vomiting or diarrhea  Initial Presentation: 58 y o  male PMH Urinary Retention 2/2 BPH, bladder spasms on Oxybutynin, s/p prostate biopsy done today urgently to ED w family member presents w acute fever onset w chills, TMax 104F 3-4 hr post surgery  Had been directed to report to ED w any fevers  Reports Daniels cath changed today w procedure    EXAM   Fever, tachycardia, tachypnea;  Labs  Band emia, elevated LA, pro nataliia; TAYLOR, abn UA  3 L Plasmalyte & IV Ceftriaxone in ED    Date:  11/2/2022   Day 2:   First Provider PLAN  Inpatient admission due to Sepsis   Per Urology consult start IV ertapenem, trend LA, repeat AM pro nataliia & Cr, PRN tylenol, follow urine/ blood CX, IVF, avodi nephrotoxin, cont home Oxybutynin   Urology  TAYLOR Creatinine on admission 1 68, now 1 64, trend CR, cont medical optimization & antibx   Follow cutures & titrate antibx as able, OP Urology for results of [address]   ED Triage Vitals   Temperature Pulse Respirations Blood Pressure SpO2   11/01/22 1624 11/01/22 1624 11/01/22 1624 11/01/22 1624 11/01/22 1624   99 7 °F (37 6 °C) (!) 112 (!) 24 118/65 94 %      Temp Source Heart Rate Source Patient Position - Orthostatic VS BP Location FiO2 (%)   11/01/22 1624 11/01/22 1624 11/01/22 1624 11/01/22 1624 --   Oral Monitor Sitting Right arm       Pain Score       11/01/22 2016       No Pain          Wt Readings from Last 1 Encounters:   11/01/22 115 kg (253 lb 8 5 oz)     Additional Vital Signs:   Date/Time Temp Pulse Resp BP MAP (mmHg) SpO2 O2 Device Patient Position - Orthostatic VS   11/02/22 08:09:07 -- 81 -- -- -- 94 % -- --   11/02/22 0737 98 5 °F (36 9 °C) -- -- 99/53 -- -- -- --   11/01/22 23:54:29 99 1 °F (37 3 °C) 78 -- -- -- 94 % -- --   11/01/22 23:19:46 100 6 °F (38 1 °C) Abnormal  79 -- -- -- 93 % -- --   11/01/22 22:27:31 101 8 °F (38 8 °C) Abnormal  83 18 98/55 -- 91 % -- --   11/01/22 20:16:43 100 7 °F (38 2 °C) Abnormal  92 18 96/55 -- 96 % -- --   11/01/22 1930 -- 91 22 109/54 77 91 % None (Room air) --   11/01/22 1915 -- 91 26 Abnormal  120/56 78 94 % None (Room air) --   11/01/22 1900 -- 91 26 Abnormal  107/53 76 93 % None (Room air) --   11/01/22 1845 -- 90 26 Abnormal  112/54 -- 93 % None (Room air) --   11/01/22 1830 -- 90 26 Abnormal  116/57 79 92 % -- --   11/01/22 1815 -- 94 24 Abnormal  132/65 -- 93 % None (Room air) --   11/01/22 1800 -- 94 26 Abnormal  126/62 -- 93 % None (Room air) --   11/01/22 1745 -- 96 26 Abnormal  124/57 -- 92 % None (Room air) --   11/01/22 1730 -- 96 24 Abnormal  130/61 -- 92 % None (Room air) --   11/01/22 1624 99 7 °F (37 6 °C) 112 Abnormal  24 Abnormal  118/65 -- 94 % None (Room air) Sitting       Weights (last 14 days)    Date/Time Weight Weight Method Height   11/01/22 1800 115 kg (253 lb 8 5 oz) Estimated 5' 9" (1 753 m)       Pertinent Labs/Diagnostic Test Results:   XR chest 1 view portable   ED Interpretation by Serena Calderon MD (11/01 1807)   No acute intrathoracic pathology appreciated on evaluation radiograph  Final Result by Connie Anand MD (11/02 0645)      No acute cardiopulmonary disease          Results from last 7 days   Lab Units 11/02/22  0009   SARS-COV-2  Negative     Results from last 7 days   Lab Units 11/02/22  0326 11/01/22  1644   WBC Thousand/uL 11 43* 6 27   HEMOGLOBIN g/dL 11 3* 13 1   HEMATOCRIT % 35 5* 39 8   PLATELETS Thousands/uL 156 189   BANDS PCT % 13* 18*         Results from last 7 days   Lab Units 11/02/22 0326 11/01/22 2209 11/01/22  1644   SODIUM mmol/L 132* 132* 132*   POTASSIUM mmol/L 4 4 4 2 4 1   CHLORIDE mmol/L 101 101 100   CO2 mmol/L 20* 21 20*   ANION GAP mmol/L 11 10 12   BUN mg/dL 30* 26* 27*   CREATININE mg/dL 1 64* 1 51* 1 68*   EGFR ml/min/1 73sq m 44 48 42   CALCIUM mg/dL 8 7 8 6 9 2     Results from last 7 days   Lab Units 11/02/22  0326 11/01/22  1644   AST U/L 22 23   ALT U/L 24 31   ALK PHOS U/L 49 66   TOTAL PROTEIN g/dL 6 9 7 8   ALBUMIN g/dL 3 8 4 3   TOTAL BILIRUBIN mg/dL 0 64 0 82         Results from last 7 days   Lab Units 11/02/22  0326 11/01/22 2209 11/01/22  1644   GLUCOSE RANDOM mg/dL 94 119 101             No results found for: BETA-HYDROXYBUTYRATE       Results from last 7 days   Lab Units 11/01/22  1752   PH EDWARDO  7 375   PCO2 EDWARDO mm Hg 38 3*   PO2 EDWARDO mm Hg 39 2   HCO3 EDWARDO mmol/L 21 9*   BASE EXC EDWARDO mmol/L -2 9   O2 CONTENT EDWARDO ml/dL 13 3   O2 HGB, VENOUS % 69 1             Results from last 7 days   Lab Units 11/01/22 2209 11/01/22  1915 11/01/22  1644   HS TNI 0HR ng/L  --   --  21   HS TNI 2HR ng/L  --  24  --    HSTNI D2 ng/L  --  3  --    HS TNI 4HR ng/L 28  --   --    HSTNI D4 ng/L 7  --   --          Results from last 7 days   Lab Units 11/01/22  1752   PROTIME seconds 14 6* INR  1 12   PTT seconds 29         Results from last 7 days   Lab Units 11/02/22  0326 11/01/22  1740   PROCALCITONIN ng/ml 59 40* 19 98*     Results from last 7 days   Lab Units 11/02/22  0326 11/02/22  0058 11/01/22  2209 11/01/22  1932 11/01/22  1740   LACTIC ACID mmol/L 1 3 2 1* 2 1* 3 1* 4 4*                                         Results from last 7 days   Lab Units 11/01/22  1805   CLARITY UA  Extra Turbid   COLOR UA  Yellow   SPEC GRAV UA  1 022   PH UA  5 5   GLUCOSE UA mg/dl Negative   KETONES UA mg/dl Negative   BLOOD UA  Large*   PROTEIN UA mg/dl 100 (2+)*   NITRITE UA  Negative   BILIRUBIN UA  Negative   UROBILINOGEN UA (BE) mg/dl 2 0*   LEUKOCYTES UA  Large*   WBC UA /hpf Innumerable*   RBC UA /hpf Innumerable*   BACTERIA UA /hpf None Seen   EPITHELIAL CELLS WET PREP /hpf Occasional   MUCUS THREADS  Moderate*     Results from last 7 days   Lab Units 11/02/22  0009   INFLUENZA A PCR  Negative   INFLUENZA B PCR  Negative   RSV PCR  Negative                             Results from last 7 days   Lab Units 11/01/22  1752 11/01/22  1740   BLOOD CULTURE  Received in Microbiology Lab  Culture in Progress  Received in Microbiology Lab  Culture in Progress       11/1/2022 ekg  Sinus tachycardia  Left anterior fascicular block  Septal infarct , age undetermined  Abnormal ECG  No previous ECGs available  ED Treatment:   Medication Administration from 11/01/2022 1612 to 11/01/2022 2012       Date/Time Order Dose Route Action     11/01/2022 1807 multi-electrolyte (PLASMALYTE-A/ISOLYTE-S PH 7 4) IV solution 1,000 mL 1,000 mL Intravenous New Bag     11/01/2022 1906 multi-electrolyte (PLASMALYTE-A/ISOLYTE-S PH 7 4) IV solution 1,000 mL 1,000 mL Intravenous New Bag     11/01/2022 1915 multi-electrolyte (PLASMALYTE-A/ISOLYTE-S PH 7 4) IV solution 1,000 mL 1,000 mL Intravenous New Bag     11/01/2022 1807 cefTRIAXone (ROCEPHIN) IVPB (premix in dextrose) 2,000 mg 50 mL 2,000 mg Intravenous New Bag        [facility] Medical History:   Diagnosis Date   • Enlarged prostate    • Sleep apnea      Present on Admission:  • Bladder spasms      Admitting Diagnosis: Tachypnea [R06 82]  Fever [R50 9]  Severe sepsis (HCC) [A41 9, R65 20]  Age/Sex: 58 y o  male  Admission Orders:  I/O  SCd    Scheduled Medications:  ertapenem, 1,000 mg, Intravenous, Q24H  finasteride, 5 mg, Oral, Daily  heparin (porcine), 5,000 Units, Subcutaneous, Q8H [address]  nicotine, 14 mg, Transdermal, Daily  oxybutynin, 15 mg, Oral, HS  tamsulosin, 0 4 mg, Oral, Daily With Dinner    Continuous IV Infusions:  multi-electrolyte, 75 mL/hr, Intravenous, Continuous      PRN Meds:  acetaminophen, 650 mg, Oral, Q6H PRN  melatonin, 3 mg, Oral, HS PRN  senna-docusate sodium, 1 tablet, Oral, Daily PRN      IP CONSULT TO UROLOGY    Network Utilization Review Department  ATTENTION: Please call with any questions or concerns to [phone] and carefully listen to the prompts so that you are directed to the right person  All voicemails are confidential   Madie Mckeon all requests for admission clinical reviews, approved or denied determinations and any other requests to dedicated fax number below belonging to the campus where the patient is receiving treatment   List of dedicated fax numbers for the Facilities:  [address] DENIALS (Administrative/Medical Necessity) [phone]   [address] (Maternity/NICU/Pediatrics) [phone]   3 Fiona Ortiz [phone]   [facility] Krish  [phone]   [facility] [place] [address] [address] [place] [address] [address] [facility] 28 [phone]   [address] [facility] [phone] 5426 [facility] [phone]

## 2022-11-02 NOTE — ASSESSMENT & PLAN NOTE
POA met sepsis criteria with tachycardia, elevated temperature, and elevated RR  · Lactic Acid 4 4, Procal 19 98  · UA: Leukocytes Large, Protein 2+, Occult Blood Large  · Urine microscopy:   Innumerable WBC, Innumerable RBC  · Received 5L Plasmalyte in the ED  · Received Ceftriaxone in the ED  Plan:   · IVF  · Per Urology, will start patient on ertapenem  · Trend Lactic Acid  · Repeat AM Procal  · PRN Tylenol for fever  · Urine culture blood culture pending

## 2022-11-02 NOTE — UTILIZATION REVIEW
NOTIFICATION OF INPATIENT ADMISSION   AUTHORIZATION REQUEST   SERVICING FACILITY:   [address]ingo [facility], [address] [place] [address]  Tax ID: [id number]  NPI: [id number]   ATTENDING PROVIDER:  Attending Name and NPI#: Mechelle Lynne Md [[id number]]  Address: [address] [place] [address] [address]  [place], [address] [place] [address]  Phone: [phone]     ADMISSION INFORMATION:  Place of Service: Inpatient [facility]  [address]  Place of Service Code: 21  Inpatient Admission Date/Time: 11/1/22  7:04 PM  Discharge Date/Time: No discharge date for patient encounter  Admitting Diagnosis Code/Description:  Tachypnea [R06 82]  Fever [R50 9]  Severe sepsis ([address] ) [A41 9, R65 20]     UTILIZATION REVIEW CONTACT:  Giulia Obando Utilization   Network Utilization Review Department  Phone: [phone]  Fax: [phone]  Email: [email or web address]  Contact for approvals/pending authorizations, clinical reviews, and discharge  PHYSICIAN ADVISORY SERVICES:  Medical Necessity Denial & [id number]-[id number] Review  Phone: [phone]  Fax: [phone]  Email: [email or web address]

## 2022-11-02 NOTE — QUICK NOTE
Urology on-call    Received TT about patient presented to the ER status post prostate biopsy earlier today  He presented with fever and met severe sepsis criteria  (procalcitonin 19 98, lactic acid 4 4)    Urine culture blood culture pending Recommend admitting to medical team for medical optimization, broad-spectrum antibiotics and treatment of severe sepsis

## 2022-11-02 NOTE — ASSESSMENT & PLAN NOTE
Elevated Cr of 1 68 on arrival, likely pre-renal  Plan:  · Fluid Hydration  · Avoid Nephrotoxic Agents  · Trend Creatinine Level

## 2022-11-02 NOTE — ASSESSMENT & PLAN NOTE
POA met sepsis criteria with tachycardia, elevated temperature, and elevated RR  · Lactic Acid POA 4 4, Procal POA 19 98  · UA: Leukocytes Large, Protein 2+, Occult Blood Large  · Urine microscopy: Innumerable WBC, Innumerable RBC  · Received 5L Plasmalyte in the ED  · Received Ceftriaxone in the ED  · Lactic acid improved  Plan:   · Discontinue IVF  · Continue Ertapenem  · PRN Tylenol for fever  · Urine culture, blood culture negative    If no growth by tomorrow, consider discharge on cefdinir

## 2022-11-02 NOTE — CONSULTS
Consult - Urology   Radha Peña 1960, 58 y o  male MRN: [id number]    Unit/Bed#: S -01 Encounter: [id number]    Assessment and Plan:  1  Sepsis  2  TAYLOR  - POD#1 transperineal prostate biopsy  - Creatinine on admission 1 68, now 1 64  likely prerenal, continue to trend  - VS now stable, afebrile  - Continue medical optimization and antibiotics with ertapenem  - Maintain cox catheter  - urology will continue to follow    Subjective:   Radha Peña is a 58year old male who is now POD#1 transperineal prostate biopsy  Patient developed post operative fevers and presented to the emergency room  Patient was admitted with sepsis and TAYLOR  Creatinine on admission 1 68, now 1 64  Patient was started on ertapenem  VS now stable, afebrile  Sitting comfortably in bed, in no acute distress  Review of Systems   Constitutional: Negative for activity change, appetite change, chills and fever  HENT: Negative for congestion and trouble swallowing  Respiratory: Negative for cough and shortness of breath  Cardiovascular: Negative for chest pain, palpitations and leg swelling  Gastrointestinal: Negative for abdominal pain, constipation, diarrhea, nausea and vomiting  Genitourinary: Negative for difficulty urinating, dysuria, flank pain, frequency, hematuria and urgency  Musculoskeletal: Negative for back pain and gait problem  Skin: Negative for wound  Allergic/Immunologic: Negative for immunocompromised state  Neurological: Negative for dizziness and syncope  Hematological: Does not bruise/bleed easily  Psychiatric/Behavioral: Negative for confusion  All other systems reviewed and are negative  Objective:  Vitals: Blood pressure 99/53, pulse 81, temperature 98 5 °F (36 9 °C), resp  rate 18, height 5' 9" (1 753 m), weight 115 kg (253 lb 8 5 oz), SpO2 94 %  ,Body mass index is 37 44 kg/m²  Physical Exam  Constitutional:       General: He is not in acute distress       Appearance: Normal appearance  He is not ill-appearing, toxic-appearing or diaphoretic  HENT:      Head: Normocephalic  Nose: No congestion  Eyes:      General: No scleral icterus  Right eye: No discharge  Left eye: No discharge  Conjunctiva/sclera: Conjunctivae normal       Pupils: Pupils are equal, round, and reactive to light  Pulmonary:      Effort: Pulmonary effort is normal    Genitourinary:     Comments: Daniels catheter in place  Musculoskeletal:      Cervical back: Normal range of motion  Skin:     General: Skin is warm and dry  Coloration: Skin is not jaundiced or pale  Findings: No bruising, erythema, lesion or rash  Neurological:      General: No focal deficit present  Mental Status: He is alert and oriented to person, place, and time  Mental status is at baseline  Gait: Gait normal    Psychiatric:         Mood and Affect: Mood normal          Behavior: Behavior normal          Thought Content: Thought content normal          Judgment: Judgment normal          Imaging:    Imaging reviewed - both report and images personally reviewed       Labs:  Recent Labs     11/01/22  1644 11/02/22  0326   WBC 6 27 11 43*     Recent Labs     11/01/22  1644 11/02/22  0326   HGB 13 1 11 3*       Recent Labs     11/01/22  1644 11/01/22  2209 11/02/22  0326   CREATININE 1 68* 1 51* 1 64*       Microbiology:      History:  Social History     Socioeconomic History   • Marital status: Single     Spouse name: None   • Number of children: None   • Years of education: None   • Highest education level: None   Occupational History   • None   Tobacco Use   • Smoking status: Former Smoker     Types: Cigarettes   • Smokeless tobacco: Never Used   • Tobacco comment: rarely - one a week or so   Vaping Use   • Vaping Use: Every day   • Substances: Nicotine   Substance and Sexual Activity   • Alcohol use: Not Currently     Comment: occ   • Drug use: Yes     Types: Marijuana     Comment: occ   • Sexual activity: Not Currently   Other Topics Concern   • None   Social History Narrative   • None     Social Determinants of Health     Financial Resource Strain: Not on file   Food Insecurity: Not on file   Transportation Needs: Not on file   Physical Activity: Not on file   Stress: Not on file   Social Connections: Not on file   Intimate Partner Violence: Not on file   Housing Stability: Not on file       Past Medical History:   Diagnosis Date   • Enlarged prostate    • Sleep apnea      Past Surgical History:   Procedure Laterality Date   • APPENDECTOMY     • CLAVICLE FRACTURE REPAIR     • CYST REMOVAL     • HERNIA REPAIR     • IL BIOPSY OF PROSTATE,NEEDLE,TRANSPERINEAL N/A 11/1/2022    Procedure: Deisi Kiser;  Surgeon: Michelle Morelos MD;  Location: [facility];  Service: Urology   • UVULECTOMY       Family History   Problem Relation Age of Onset   • Dementia Mother    • Prostate cancer Father        Andrez Harpreet  Date: 11/2/2022 Time: 9:58 AM

## 2022-11-02 NOTE — H&P
[facility]  H&P- Gautam Nicholas 1960, 58 y o  male MRN: [id number]  Unit/Bed#: S -01 Encounter: [id number]  Primary Care Provider: JANICE Hector   Date and time admitted to hospital: 11/1/2022  4:37 PM    * Sepsis [facility])  Assessment & Plan  POA met sepsis criteria with tachycardia, elevated temperature, and elevated RR  · Lactic Acid 4 4, Procal 19 98  · UA: Leukocytes Large, Protein 2+, Occult Blood Large  · Urine microscopy: Innumerable WBC, Innumerable RBC  · Received 5L Plasmalyte in the ED  · Received Ceftriaxone in the ED  Plan:   · IVF  · Per Urology, will start patient on ertapenem  · Trend Lactic Acid  · Repeat AM Procal  · PRN Tylenol for fever  · Urine culture blood culture pending       TAYLOR (acute kidney injury) ([facility] [address] )  Assessment & Plan  Elevated Cr of 1 68 on arrival, likely pre-renal  Plan:  · Fluid Hydration  · Avoid Nephrotoxic Agents  · Trend Creatinine Level    Hyponatremia  Assessment & Plan  Low sodium on arrival with level of 132  Plan: Fluid correction    Fever and chills  Assessment & Plan  Patient is 57 yo male with a PMH of Hyperlipidemia, Urinary Retention 2/2 BPH, MOE, and Obesity who presents with complaints of acute onset fever and chills reported at home with Tmax 104F using home thermometer  · Patient s/p prostate biopsy  · Will start antibiotics as above  · Urology Consulted      Bladder spasms  Assessment & Plan  Patient reports he has a history of bladder spasms, which he currently appreciates  · Takes Oxybutynin at home  · Will Continue        VTE Pharmacologic Prophylaxis: VTE Score: 5 High Risk (Score >/= 5) - Pharmacological DVT Prophylaxis Ordered: heparin  Sequential Compression Devices Ordered  Code Status: Level 1 - Full Code   Discussion with family: Updated  (sister) via phone      Anticipated Length of Stay: Patient will be admitted on an observation basis with an anticipated length of stay of less than 2 midnights secondary to Sepsis w/u  Chief Complaint: Fever Chills    History of Present Illness:  Patient is 57 yo male with a PMH of Hyperlipidemia, Urinary Retention 2/2 BPH, MOE, and Obesity who presents with complaints of acute onset fever and chills reported at home with Tmax 104F using home thermometer  Patient reports he also had associated rigors, chills, diaphoresis  Patient is status post prostate biopsy that was performed earlier today   Patient states that upon returning home he developed symptoms 3-4 hours after surgery  He was instructed to report to the emergency department if he developed any fevers after procedure  Patient notes procedure was performed for urinary retention necessitating several Cox catheters over the last couple months  He states that his Cox catheter was last changed today  Patient denies any difficulty breathing or shortness of breath  No abdominal pain, n/v/d   Patient states that biopsy was performed because he was having difficulty with urinary retention necessitating cox catheter placement  Last changed today s/p procedure  Review of Systems:  Review of Systems   Constitutional: Positive for chills, diaphoresis and fever  Negative for activity change and appetite change  Respiratory: Negative for cough and shortness of breath  Cardiovascular: Negative for chest pain, palpitations and leg swelling  Gastrointestinal: Negative for abdominal distention and abdominal pain  Endocrine: Negative for cold intolerance and heat intolerance  Genitourinary: Negative for decreased urine volume, difficulty urinating, flank pain and hematuria  Neurological: Negative for dizziness and headaches  Psychiatric/Behavioral: Negative for agitation and behavioral problems  All other systems reviewed and are negative     the id present please see    Past Medical and Surgical History:   Past Medical History:   Diagnosis Date   • Enlarged prostate    • Sleep apnea        Past Surgical History:   Procedure Laterality Date   • APPENDECTOMY     • CLAVICLE FRACTURE REPAIR     • CYST REMOVAL     • HERNIA REPAIR     • ID BIOPSY OF PROSTATE,NEEDLE,TRANSPERINEAL N/A 11/1/2022    Procedure: Roberto Bath BIOPSY PROSTATE;  Surgeon: Adalberto Glaser MD;  [facility];  Service: Urology   • UVULECTOMY         Meds/Allergies:  Prior to Admission medications    Medication Sig Start Date End Date Taking? Authorizing Provider   finasteride (PROSCAR) 5 mg tablet Take 1 tablet (5 mg total) by mouth daily 9/7/22 12/6/22  Jovita Yan MD   lidocaine (XYLOCAINE) 2 % topical gel Apply topically as needed for mild pain 8/24/22   Dianna Duckworth MD   oxybutynin (DITROPAN XL) 15 MG 24 hr tablet Take 1 tablet (15 mg total) by mouth daily at bedtime 11/1/22 12/1/22  Adalberto Glaser MD   sulfamethoxazole-trimethoprim (BACTRIM DS) 800-160 mg per tablet Take 1 tablet by mouth 2 (two) times a day for 7 days 10/27/22 11/3/22  Rhianna Krause PA-C   tamsulosin [facility]) 0 4 mg Take 1 capsule (0 4 mg total) by mouth daily with dinner 9/7/22 12/6/22  Jovita Yan MD     I have reviewed home medications with patient family member  Allergies:    Allergies   Allergen Reactions   • Keflex [Cephalexin]        Social History:  Marital Status: Single   Occupation: Unknown  Patient Pre-hospital Living Situation: Home  Patient Pre-hospital Level of Mobility: walks  Patient Pre-hospital Diet Restrictions: N/A  Substance Use History:   Social History     Substance and Sexual Activity   Alcohol Use Not Currently    Comment: occ     Social History     Tobacco Use   Smoking Status Former Smoker   • Types: Cigarettes   Smokeless Tobacco Never Used   Tobacco Comment    rarely - one a week or so     Social History     Substance and Sexual Activity   Drug Use Yes   • Types: Marijuana    Comment: occ       Family History:  Family History   Problem Relation Age of Onset   • Dementia Mother    • Prostate cancer Father        Physical Exam:     Vitals:   Blood Pressure: 98/55 (11/01/22 2227)  Pulse: 78 (11/01/22 2354)  Temperature: 99 1 °F (37 3 °C) (11/01/22 2354)  Temp Source: Oral (11/01/22 2016)  Respirations: 18 (11/01/22 2227)  Height: 5' 9" (175 3 cm) (11/01/22 1800)  Weight - Scale: 115 kg (253 lb 8 5 oz) (11/01/22 1800)  SpO2: 94 % (11/01/22 2354)    Physical Exam  Vitals and nursing note reviewed  Constitutional:       Appearance: He is well-developed  He is diaphoretic  Interventions: Nasal cannula in place  Comments: febrile   HENT:      Head: Normocephalic and atraumatic  Eyes:      Conjunctiva/sclera: Conjunctivae normal    Cardiovascular:      Rate and Rhythm: Regular rhythm  Tachycardia present  Heart sounds: No murmur heard  Pulmonary:      Effort: Pulmonary effort is normal  Tachypnea present  No respiratory distress  Breath sounds: Normal breath sounds  Abdominal:      Palpations: Abdomen is soft  Tenderness: There is no abdominal tenderness  Genitourinary:     Comments: Folate cath in place draining clear yellow urine  Musculoskeletal:         General: No swelling  Cervical back: Neck supple  Skin:     General: Skin is warm  Neurological:      General: No focal deficit present  Mental Status: He is alert and oriented to person, place, and time  Mental status is at baseline     Psychiatric:         Mood and Affect: Mood normal          Behavior: Behavior normal           Additional Data:     Lab Results:  Results from last 7 days   Lab Units 11/02/22  0326   WBC Thousand/uL 11 43*   HEMOGLOBIN g/dL 11 3*   HEMATOCRIT % 35 5*   PLATELETS Thousands/uL 156   BANDS PCT % 13*   LYMPHO PCT % 5*   MONO PCT % 4   EOS PCT % 0     Results from last 7 days   Lab Units 11/02/22  0326   SODIUM mmol/L 132*   POTASSIUM mmol/L 4 4   CHLORIDE mmol/L 101   CO2 mmol/L 20*   BUN mg/dL 30*   CREATININE mg/dL 1 64*   ANION GAP mmol/L 11   CALCIUM mg/dL 8  7   ALBUMIN g/dL 3 8   TOTAL BILIRUBIN mg/dL 0 64   ALK PHOS U/L 49   ALT U/L 24   AST U/L 22   GLUCOSE RANDOM mg/dL 94     Results from last 7 days   Lab Units 11/01/22  1752   INR  1 12             Results from last 7 days   Lab Units 11/02/22  0326 11/02/22  0058 11/01/22  2209 11/01/22  1932 11/01/22  1740   LACTIC ACID mmol/L 1 3 2 1* 2 1* 3 1* 4 4*   PROCALCITONIN ng/ml 59 40*  --   --   --  19 98*       Imaging: Reviewed radiology reports from this admission including: chest xray  XR chest 1 view portable   ED Interpretation by Joshua No MD (11/01 1807)   No acute intrathoracic pathology appreciated on evaluation radiograph  ** Please Note: This note has been constructed using a voice recognition system   **

## 2022-11-02 NOTE — TELEPHONE ENCOUNTER
Post Op Note    Addis Ramirez is a 58 y o  male s/p transperineal MRI fusion prostate biopsy performed 11/1/2022  Addis Ramirez is a patient of Dr Nam Corey and is seen at the [facility] office  After reviewing patient's chart, he was admitted for sepsis following the procedure  Patient has no follow up visit at this time

## 2022-11-02 NOTE — ASSESSMENT & PLAN NOTE
Patient is 59 yo male with a PMH of Hyperlipidemia, Urinary Retention 2/2 BPH, MOE, and Obesity who presents with complaints of acute onset fever and chills reported at home with Tmax 104F using home thermometer  · Patient s/p prostate biopsy  · Will continue antibiotics as above  · Urology onboard

## 2022-11-02 NOTE — ASSESSMENT & PLAN NOTE
Patient reports he has a history of bladder spasms, which he currently appreciates    · Takes Oxybutynin at home  · Will Continue

## 2022-11-02 NOTE — ASSESSMENT & PLAN NOTE
Patient is 57 yo male with a PMH of Hyperlipidemia, Urinary Retention 2/2 BPH, MOE, and Obesity who presents with complaints of acute onset fever and chills reported at home with Tmax 104F using home thermometer     · Patient s/p prostate biopsy  · Will start antibiotics as above  · Urology Consulted

## 2022-11-03 PROBLEM — E87.1 HYPONATREMIA: Status: RESOLVED | Noted: 2022-11-02 | Resolved: 2022-11-03

## 2022-11-03 PROBLEM — N17.9 AKI (ACUTE KIDNEY INJURY) (HCC): Status: RESOLVED | Noted: 2022-11-02 | Resolved: 2022-11-03

## 2022-11-03 LAB
ANION GAP SERPL CALCULATED.3IONS-SCNC: 6 MMOL/L (ref 4–13)
BUN SERPL-MCNC: 20 MG/DL (ref 5–25)
CALCIUM SERPL-MCNC: 8 MG/DL (ref 8.4–10.2)
CHLORIDE SERPL-SCNC: 104 MMOL/L (ref 96–108)
CO2 SERPL-SCNC: 25 MMOL/L (ref 21–32)
CREAT SERPL-MCNC: 1 MG/DL (ref 0.6–1.3)
ERYTHROCYTE [DISTWIDTH] IN BLOOD BY AUTOMATED COUNT: 14.7 % (ref 11.6–15.1)
GFR SERPL CREATININE-BSD FRML MDRD: 80 ML/MIN/1.73SQ M
GLUCOSE SERPL-MCNC: 124 MG/DL (ref 65–140)
HCT VFR BLD AUTO: 31.4 % (ref 36.5–49.3)
HGB BLD-MCNC: 10 G/DL (ref 12–17)
MCH RBC QN AUTO: 27.9 PG (ref 26.8–34.3)
MCHC RBC AUTO-ENTMCNC: 31.8 G/DL (ref 31.4–37.4)
MCV RBC AUTO: 88 FL (ref 82–98)
PLATELET # BLD AUTO: 107 THOUSANDS/UL (ref 149–390)
PMV BLD AUTO: 10.3 FL (ref 8.9–12.7)
POTASSIUM SERPL-SCNC: 4.1 MMOL/L (ref 3.5–5.3)
RBC # BLD AUTO: 3.58 MILLION/UL (ref 3.88–5.62)
SODIUM SERPL-SCNC: 135 MMOL/L (ref 135–147)
WBC # BLD AUTO: 5.59 THOUSAND/UL (ref 4.31–10.16)

## 2022-11-03 RX ADMIN — OXYBUTYNIN CHLORIDE 15 MG: 5 TABLET, EXTENDED RELEASE ORAL at 21:20

## 2022-11-03 RX ADMIN — HEPARIN SODIUM 5000 UNITS: 5000 INJECTION INTRAVENOUS; SUBCUTANEOUS at 15:14

## 2022-11-03 RX ADMIN — FINASTERIDE 5 MG: 5 TABLET, FILM COATED ORAL at 09:05

## 2022-11-03 RX ADMIN — NICOTINE 14 MG: 14 PATCH, EXTENDED RELEASE TRANSDERMAL at 09:05

## 2022-11-03 RX ADMIN — SODIUM CHLORIDE, SODIUM GLUCONATE, SODIUM ACETATE, POTASSIUM CHLORIDE, MAGNESIUM CHLORIDE, SODIUM PHOSPHATE, DIBASIC, AND POTASSIUM PHOSPHATE 75 ML/HR: .53; .5; .37; .037; .03; .012; .00082 INJECTION, SOLUTION INTRAVENOUS at 02:03

## 2022-11-03 RX ADMIN — HEPARIN SODIUM 5000 UNITS: 5000 INJECTION INTRAVENOUS; SUBCUTANEOUS at 04:38

## 2022-11-03 RX ADMIN — TAMSULOSIN HYDROCHLORIDE 0.4 MG: 0.4 CAPSULE ORAL at 16:08

## 2022-11-03 RX ADMIN — HEPARIN SODIUM 5000 UNITS: 5000 INJECTION INTRAVENOUS; SUBCUTANEOUS at 21:20

## 2022-11-03 RX ADMIN — ERTAPENEM SODIUM 1000 MG: 1 INJECTION, POWDER, LYOPHILIZED, FOR SOLUTION INTRAMUSCULAR; INTRAVENOUS at 21:20

## 2022-11-03 NOTE — PROGRESS NOTES
Progress Note - Urology  Karen Araujo 1960, 58 y o  male MRN: [id number]    Unit/Bed#: S -01 Encounter: [id number]        Assessment & Plan:  1  Sepsis   2  TAYLOR, resolved  3  BPH with chronic urinary retention  - POD#2 transperineal prostate biopsy   - doing well  - Afebrile and VSS  -No leukocytosis  - Urine culture and prelim BC negative  - Cr normalized  - Hmg 10, platelets 480, continue to trend  - Maintain cox on discharge  - Will sign off at this time and see patient as scheduled in the outpatient setting for pathology review  Subjective:   HPI: No acute events overnight  Patient doing well and denies any complaints  He is afebrile and VSS  Cox is patent for clear yellow urine  No leukocytosis  Hemoglobin 10, platelets 470  Urine culture negative  Preliminary blood cultures negative  Renal function normalized  Review of Systems   Constitutional: Negative for chills and fever  Respiratory: Negative for shortness of breath  Cardiovascular: Negative for chest pain  Gastrointestinal: Negative for abdominal pain, nausea and vomiting  Genitourinary: Negative for dysuria, flank pain, hematuria and penile pain  Neurological: Negative for dizziness  Objective:  Vitals: Blood pressure 102/54, pulse 70, temperature 98 6 °F (37 °C), resp  rate 20, height 5' 9" (1 753 m), weight 115 kg (253 lb 8 5 oz), SpO2 93 %  ,Body mass index is 37 44 kg/m²  Physical Exam  Constitutional:       General: He is not in acute distress  Appearance: Normal appearance  He is obese  He is not ill-appearing or toxic-appearing  HENT:      Head: Normocephalic and atraumatic  Right Ear: External ear normal       Left Ear: External ear normal       Nose: Nose normal    Eyes:      General: No scleral icterus  Conjunctiva/sclera: Conjunctivae normal    Cardiovascular:      Pulses: Normal pulses     Pulmonary:      Effort: Pulmonary effort is normal    Genitourinary:     Comments: Jeremiah patent for clear yellow urine  Musculoskeletal:      Cervical back: Normal range of motion  Skin:     General: Skin is warm and dry  Neurological:      General: No focal deficit present  Mental Status: He is alert and oriented to person, place, and time  Psychiatric:         Behavior: Behavior normal          Thought Content:  Thought content normal          Judgment: Judgment normal          Labs:  Recent Labs     11/01/22  1644 11/02/22  0326 11/03/22  0455   WBC 6 27 11 43* 5 59     Recent Labs     11/01/22  1644 11/02/22  0326 11/03/22  0455   HGB 13 1 11 3* 10 0*     Recent Labs     11/01/22  1644 11/02/22  0326 11/03/22  0455   HCT 39 8 35 5* 31 4*     Recent Labs     11/01/22  1644 11/01/22  2209 11/02/22  0326 11/03/22  0455   CREATININE 1 68* 1 51* 1 64* 1 00       History:  Past Medical History:   Diagnosis Date   • Enlarged prostate    • Sleep apnea      Social History     Socioeconomic History   • Marital status: Single     Spouse name: None   • Number of children: None   • Years of education: None   • Highest education level: None   Occupational History   • None   Tobacco Use   • Smoking status: Former Smoker     Types: Cigarettes   • Smokeless tobacco: Never Used   • Tobacco comment: rarely - one a week or so   Vaping Use   • Vaping Use: Every day   • Substances: Nicotine   Substance and Sexual Activity   • Alcohol use: Not Currently     Comment: occ   • Drug use: Yes     Types: Marijuana     Comment: occ   • Sexual activity: Not Currently   Other Topics Concern   • None   Social History Narrative   • None     Social Determinants of Health     Financial Resource Strain: Not on file   Food Insecurity: Not on file   Transportation Needs: Not on file   Physical Activity: Not on file   Stress: Not on file   Social Connections: Not on file   Intimate Partner Violence: Not on file   Housing Stability: Not on file     Past Surgical History:   Procedure Laterality Date   • APPENDECTOMY     • CLAVICLE FRACTURE REPAIR     • CYST REMOVAL     • HERNIA REPAIR     • DC BIOPSY OF PROSTATE,NEEDLE,TRANSPERINEAL N/A 11/1/2022    Procedure: Earlyne Miss BIOPSY PROSTATE;  Surgeon: Juli Altamirano MD;  Location: [facility];  Service: Urology   • UVULECTOMY       Family History   Problem Relation Age of Onset   • Dementia Mother    • Prostate cancer Father        Debara Nina  Date: 11/3/2022 Time: 11:23 AM

## 2022-11-03 NOTE — PROGRESS NOTES
[facility]  Progress Note - Evelyn De Santiago 1960, 58 y o  male MRN: [id number]  Unit/Bed#: S -01 Encounter: [id number]  Primary Care Provider: JANICE Roman   Date and time admitted to hospital: 11/1/2022  4:37 PM    * Sepsis [facility])  Assessment & Plan  POA met sepsis criteria with tachycardia, elevated temperature, and elevated RR  · Lactic Acid POA 4 4, Procal POA 19 98  · UA: Leukocytes Large, Protein 2+, Occult Blood Large  · Urine microscopy: Innumerable WBC, Innumerable RBC  · Received 5L Plasmalyte in the ED  · Received Ceftriaxone in the ED  · Lactic acid improved  Plan:   · Discontinue IVF  · Continue Ertapenem  · PRN Tylenol for fever  · Urine culture, blood culture negative  If no growth by tomorrow, consider discharge on cefdinir       Fever and chills  Assessment & Plan  Patient is 57 yo male with a PMH of Hyperlipidemia, Urinary Retention 2/2 BPH, MOE, and Obesity who presents with complaints of acute onset fever and chills reported at home with Tmax 104F using home thermometer  · Patient s/p prostate biopsy  · Will continue antibiotics as above  · Urology onboard  Bladder spasms  Assessment & Plan  Patient reports he has a history of bladder spasms, which he currently appreciates  · Takes Oxybutynin at home  · Will Continue      TAYLOR (acute kidney injury) (HCC)-resolved as of 11/3/2022  Assessment & Plan  Elevated Cr of 1 68 on arrival, likely pre-renal  Plan:  · Fluid Hydration  · Avoid Nephrotoxic Agents  · Trend Creatinine Level    Hyponatremia-resolved as of 11/3/2022  Assessment & Plan  Low sodium on arrival with level of 132  Plan:   Fluid correction  Monitor BMP  VTE Pharmacologic Prophylaxis: VTE Score: 5 Moderate Risk (Score 3-4) - Pharmacological DVT Prophylaxis Ordered: heparin  Patient Centered Rounds: I performed bedside rounds with nursing staff today    Discussions with Specialists or Other Care Team Provider: Education and Discussions with Family / Patient: Updated  (sister) via phone  Current Length of Stay: 2 day(s)  Current Patient Status: Inpatient   Discharge Plan: Anticipate discharge tomorrow to home  Code Status: Level 1 - Full Code    Subjective:   Patient lying comfortably on bed  No complaints  No overnight events  No fever  Denies pain  Objective:     Vitals:   Temp (24hrs), Av 8 °F (37 1 °C), Min:98 3 °F (36 8 °C), Max:99 5 °F (37 5 °C)    Temp:  [98 3 °F (36 8 °C)-99 5 °F (37 5 °C)] 98 6 °F (37 °C)  HR:  [70-74] 70  Resp:  [20-22] 20  BP: (102-106)/(53-55) 102/54  SpO2:  [93 %-95 %] 93 %  Body mass index is 37 44 kg/m²  Input and Output Summary (last 24 hours): Intake/Output Summary (Last 24 hours) at 11/3/2022 1525  Last data filed at 11/3/2022 0401  Gross per 24 hour   Intake --   Output 2900 ml   Net -2900 ml       Physical Exam:   Physical Exam  Constitutional:       Appearance: Normal appearance  HENT:      Head: Normocephalic  Mouth/Throat:      Mouth: Mucous membranes are moist       Pharynx: Oropharynx is clear  Eyes:      Conjunctiva/sclera: Conjunctivae normal    Cardiovascular:      Rate and Rhythm: Normal rate and regular rhythm  Pulses: Normal pulses  Heart sounds: Normal heart sounds  Pulmonary:      Effort: Pulmonary effort is normal       Breath sounds: Normal breath sounds  Abdominal:      Palpations: Abdomen is soft  Skin:     General: Skin is warm  Capillary Refill: Capillary refill takes less than 2 seconds  Neurological:      General: No focal deficit present  Mental Status: He is alert and oriented to person, place, and time     Psychiatric:         Mood and Affect: Mood normal          Behavior: Behavior normal           Additional Data:     Labs:  Results from last 7 days   Lab Units 22  0455 22  0326   WBC Thousand/uL 5 59 11 43*   HEMOGLOBIN g/dL 10 0* 11 3*   HEMATOCRIT % 31 4* 35 5* PLATELETS Thousands/uL 107* 156   BANDS PCT %  --  13*   LYMPHO PCT %  --  5*   MONO PCT %  --  4   EOS PCT %  --  0     Results from last 7 days   Lab Units 11/03/22  0455 11/02/22  0326   SODIUM mmol/L 135 132*   POTASSIUM mmol/L 4 1 4 4   CHLORIDE mmol/L 104 101   CO2 mmol/L 25 20*   BUN mg/dL 20 30*   CREATININE mg/dL 1 00 1 64*   ANION GAP mmol/L 6 11   CALCIUM mg/dL 8 0* 8 7   ALBUMIN g/dL  --  3 8   TOTAL BILIRUBIN mg/dL  --  0 64   ALK PHOS U/L  --  49   ALT U/L  --  24   AST U/L  --  22   GLUCOSE RANDOM mg/dL 124 94     Results from last 7 days   Lab Units 11/01/22  1752   INR  1 12             Results from last 7 days   Lab Units 11/02/22  0326 11/02/22  0058 11/01/22  2209 11/01/22  1932 11/01/22  1740   LACTIC ACID mmol/L 1 3 2 1* 2 1* 3 1* 4 4*   PROCALCITONIN ng/ml 59 40*  --   --   --  19 98*       Lines/Drains:  Invasive Devices  Report    Peripheral Intravenous Line  Duration           Peripheral IV 11/01/22 Left;Lateral Arm 1 day    Peripheral IV 11/01/22 Right Antecubital 1 day          Drain  Duration           Urethral Catheter Non-latex 16 Fr  2 days                           Imaging: Reviewed radiology reports from this admission including: chest xray    Recent Cultures (last 7 days):   Results from last 7 days   Lab Units 11/01/22  1805 11/01/22  1752 11/01/22  1740   BLOOD CULTURE   --  No Growth at 24 hrs  No Growth at 24 hrs     URINE CULTURE  No Growth <1000 cfu/mL  --   --        Last 24 Hours Medication List:   Current Facility-Administered Medications   Medication Dose Route Frequency Provider Last Rate   • acetaminophen  650 mg Oral Q6H PRN Beryle Carnes Dicesare, DO     • ertapenem  1,000 mg Intravenous Q24H Heidi Dunbar DO 1,000 mg (11/02/22 2147)   • finasteride  5 mg Oral Daily Beryle Carnes Dicesare, DO     • heparin (porcine)  5,000 Units Subcutaneous [facility] Beryle Carnes Dicesare, DO     • melatonin  3 mg Oral HS PRN Ella Sandhya, DO     • nicotine  14 mg Transdermal Daily Jyoti Chacon DO     • oxybutynin  15 mg Oral HS Jyoti Chacon DO     • senna-docusate sodium  1 tablet Oral Daily PRN Tran Trotter MD     • tamsulosin  0 4 mg Oral Daily With Suzy Payan, DO          Today, Patient Was Seen By: Tran Trotter    **Please Note: This note may have been constructed using a voice recognition system  **

## 2022-11-04 VITALS
HEIGHT: 69 IN | TEMPERATURE: 98.2 F | HEART RATE: 62 BPM | DIASTOLIC BLOOD PRESSURE: 61 MMHG | SYSTOLIC BLOOD PRESSURE: 117 MMHG | WEIGHT: 253.53 LBS | RESPIRATION RATE: 18 BRPM | OXYGEN SATURATION: 94 % | BODY MASS INDEX: 37.55 KG/M2

## 2022-11-04 PROBLEM — R50.9 FEVER AND CHILLS: Status: RESOLVED | Noted: 2022-11-02 | Resolved: 2022-11-04

## 2022-11-04 LAB
ANION GAP SERPL CALCULATED.3IONS-SCNC: 9 MMOL/L (ref 4–13)
BASOPHILS # BLD MANUAL: 0 THOUSAND/UL (ref 0–0.1)
BASOPHILS NFR MAR MANUAL: 0 % (ref 0–1)
BUN SERPL-MCNC: 15 MG/DL (ref 5–25)
CALCIUM SERPL-MCNC: 8.6 MG/DL (ref 8.4–10.2)
CHLORIDE SERPL-SCNC: 102 MMOL/L (ref 96–108)
CO2 SERPL-SCNC: 25 MMOL/L (ref 21–32)
CREAT SERPL-MCNC: 0.95 MG/DL (ref 0.6–1.3)
EOSINOPHIL # BLD MANUAL: 0.11 THOUSAND/UL (ref 0–0.4)
EOSINOPHIL NFR BLD MANUAL: 2 % (ref 0–6)
ERYTHROCYTE [DISTWIDTH] IN BLOOD BY AUTOMATED COUNT: 14.7 % (ref 11.6–15.1)
GFR SERPL CREATININE-BSD FRML MDRD: 85 ML/MIN/1.73SQ M
GLUCOSE SERPL-MCNC: 68 MG/DL (ref 65–140)
HCT VFR BLD AUTO: 31.9 % (ref 36.5–49.3)
HGB BLD-MCNC: 10.2 G/DL (ref 12–17)
LYMPHOCYTES # BLD AUTO: 0.9 THOUSAND/UL (ref 0.6–4.47)
LYMPHOCYTES # BLD AUTO: 17 % (ref 14–44)
MCH RBC QN AUTO: 27.9 PG (ref 26.8–34.3)
MCHC RBC AUTO-ENTMCNC: 32 G/DL (ref 31.4–37.4)
MCV RBC AUTO: 87 FL (ref 82–98)
MONOCYTES # BLD AUTO: 0.21 THOUSAND/UL (ref 0–1.22)
MONOCYTES NFR BLD: 4 % (ref 4–12)
NEUTROPHILS # BLD MANUAL: 4.1 THOUSAND/UL (ref 1.85–7.62)
NEUTS BAND NFR BLD MANUAL: 17 % (ref 0–8)
NEUTS SEG NFR BLD AUTO: 60 % (ref 43–75)
PLATELET # BLD AUTO: 126 THOUSANDS/UL (ref 149–390)
PLATELET BLD QL SMEAR: ADEQUATE
PMV BLD AUTO: 11.3 FL (ref 8.9–12.7)
POTASSIUM SERPL-SCNC: 4.4 MMOL/L (ref 3.5–5.3)
RBC # BLD AUTO: 3.66 MILLION/UL (ref 3.88–5.62)
RBC MORPH BLD: NORMAL
SODIUM SERPL-SCNC: 136 MMOL/L (ref 135–147)
WBC # BLD AUTO: 5.32 THOUSAND/UL (ref 4.31–10.16)

## 2022-11-04 RX ORDER — DOXYCYCLINE 50 MG/1
50 TABLET ORAL 2 TIMES DAILY
Qty: 8 TABLET | Refills: 0 | Status: SHIPPED | OUTPATIENT
Start: 2022-11-04 | End: 2022-11-08

## 2022-11-04 RX ADMIN — NICOTINE 14 MG: 14 PATCH, EXTENDED RELEASE TRANSDERMAL at 08:18

## 2022-11-04 RX ADMIN — Medication 3 MG: at 02:55

## 2022-11-04 RX ADMIN — HEPARIN SODIUM 5000 UNITS: 5000 INJECTION INTRAVENOUS; SUBCUTANEOUS at 14:04

## 2022-11-04 RX ADMIN — HEPARIN SODIUM 5000 UNITS: 5000 INJECTION INTRAVENOUS; SUBCUTANEOUS at 05:32

## 2022-11-04 RX ADMIN — FINASTERIDE 5 MG: 5 TABLET, FILM COATED ORAL at 08:17

## 2022-11-04 NOTE — DISCHARGE SUMMARY
[facility]  Discharge- Monna Borders 1960, 58 y o  male MRN: [id number]  Unit/Bed#: S -01 Encounter: [id number]  Primary Care Provider: JANICE Mariano   Date and time admitted to hospital: 11/1/2022  4:37 PM    * Post procedure Sepsis [facility])  Assessment & Plan  POA met sepsis criteria with tachycardia, elevated temperature, and elevated RR  · Lactic Acid POA 4 4, Procal POA 19 98  · UA: Leukocytes Large, Protein 2+, Occult Blood Large  · Urine microscopy: Innumerable WBC, Innumerable RBC  · Received 5L Plasmalyte in the ED  · Received Ceftriaxone in the ED  · Lactic acid improved  Plan:   Continue doxycycline for 4 more days  Follow-up with PCP in 1 week  Follow-up with urology      Fever and chills-resolved as of 11/4/2022  Assessment & Plan  Patient is 59 yo male with a PMH of Hyperlipidemia, Urinary Retention 2/2 BPH, MOE, and Obesity who presents with complaints of acute onset fever and chills reported at home with Tmax 104F using home thermometer  · Patient s/p prostate biopsy  Bladder spasms  Assessment & Plan  Patient reports he has a history of bladder spasms, which he currently appreciates  Takes Oxybutynin at home  Continue home medications  Follow-up with PCP      TAYLOR (acute kidney injury) (HCC)-resolved as of 11/3/2022  Assessment & Plan  Elevated Cr of 1 68 on arrival, likely pre-renal  Creatinine improved with IV fluids    Plan:  Follow-up with PCP    Hyponatremia-resolved as of 11/3/2022  Assessment & Plan  Low sodium on arrival with level of 132  Plan:   Follow-up with PCP        Medical Problems             Resolved Problems  Date Reviewed: 11/4/2022          Resolved    Fever and chills 11/4/2022     Resolved by  Court Hagan MD    Hyponatremia 11/3/2022     Resolved by  Court Hagan MD    TAYLOR (acute kidney injury) ([facility] [address] ) 11/3/2022     Resolved by Leroy Brown MD              Discharging Resident: Leroy Brown  Discharging Attending: No att  providers found  [facility] Zane Winchester, [facility]   Admission Date:   Admission Orders (From admission, onward)     Ordered        11/01/22 1904  INPATIENT ADMISSION  Once                      Discharge Date: 11/04/22    Consultations During Hospital Stay:  · Urology    Procedures Performed:   · None    Significant Findings / Test Results:   · Elevated Lactic acid and procalcitonin on admission  · UA:  Leukocyte large  Occult blood large  Nitrite negative  Incidental Findings:   · None    Test Results Pending at Discharge (will require follow up): · None     Outpatient Tests Requested:  · None    Complications:  None    Reason for Admission:  Sepsis following prostate biopsy  Hospital Course:   Sherif Rene is a 58 y o  male patient who originally presented to the hospital on 11/1/2022 due to sepsis following prostate biopsy  Lactic acid 4 4 on admission  Patient was treated with ertapenem for 3 days  Also treated with IV fluids  Patient improved following treatments  Blood cultures and urine cultures negative  Patient is being discharged on oral doxycycline for 4 more days  Patient is on Daniels catheter  Patient is being discharged with the Daniels catheter per Urology recommendation  Patient may follow up outpatient with Urology  Please see above list of diagnoses and related plan for additional information  Condition at Discharge: good    Discharge Day Visit / Exam:   Subjective:  Patient is feeling better  Denies pain  No fever  Vitals stable  Urinary catheter draining well  No blood in urine     Vitals: Blood Pressure: 117/61 (11/04/22 0744)  Pulse: 62 (11/04/22 0744)  Temperature: 98 2 °F (36 8 °C) (11/04/22 0744)  Temp Source: Oral (11/01/22 2016)  Respirations: 18 (11/04/22 0744)  Height: 5' 9" (175 3 cm) (11/01/22 1800)  Weight - Scale: 115 kg (253 lb 8 5 oz) (11/01/22 1800)  SpO2: 94 % (11/04/22 1500)  Exam:   Physical Exam  Constitutional:       General: He is not in acute distress  Appearance: Normal appearance  He is not ill-appearing  HENT:      Head: Normocephalic  Mouth/Throat:      Mouth: Mucous membranes are moist       Pharynx: Oropharynx is clear  Eyes:      Conjunctiva/sclera: Conjunctivae normal    Cardiovascular:      Rate and Rhythm: Normal rate and regular rhythm  Pulses: Normal pulses  Heart sounds: Normal heart sounds  Pulmonary:      Effort: Pulmonary effort is normal       Breath sounds: Normal breath sounds  Abdominal:      General: Bowel sounds are normal       Palpations: Abdomen is soft  Skin:     General: Skin is warm and dry  Capillary Refill: Capillary refill takes less than 2 seconds  Neurological:      General: No focal deficit present  Mental Status: He is alert and oriented to person, place, and time  Psychiatric:         Mood and Affect: Mood normal          Behavior: Behavior normal             Discharge instructions/Information to patient and family:   See after visit summary for information provided to patient and family  Provisions for Follow-Up Care:  See after visit summary for information related to follow-up care and any pertinent home health orders  Disposition:   Home    Planned Readmission: no    Discharge Medications:  See after visit summary for reconciled discharge medications provided to patient and/or family        **Please Note: This note may have been constructed using a voice recognition system**

## 2022-11-04 NOTE — CASE MANAGEMENT
Case Management Progress Note    Patient name Cain Lynn  Location S /S -01 MRN [id number]  : 1960 Date 2022       LOS (days): 3  Geometric Mean LOS (GMLOS) (days): 3 50  Days to GMLOS:0 6        OBJECTIVE:        Current admission status: Inpatient  Preferred Pharmacy:   [facility] [address] [place] [address]  Phone: [phone] Fax: [phone]    Primary Care Provider: JANICE Presley    Primary Insurance: Anna FERNANDEZ  Secondary Insurance:     PROGRESS NOTE:    Respiratory completed home O2 eval  Pt does not qualify for home O2

## 2022-11-04 NOTE — CASE MANAGEMENT
Case Management Assessment & Discharge Planning Note    Patient name Timothy Piña  Location S /S -01 MRN [id number]  : 1960 Date 2022       Current Admission Date: 2022  Current Admission Diagnosis:Post procedure Sepsis [facility])   Patient Active Problem List    Diagnosis Date Noted   • Post procedure Sepsis ([address] ) 2022   • Fever and chills 2022   • Sleep apnea 2022   • Bladder spasms 2022   • Gross hematuria 2022   • Angiokeratoma of scrotum 2022   • Urinary retention due to benign prostatic hyperplasia 2022   • Daniels catheter in place 2022   • Encounter to discuss test results 2022   • Hyperlipidemia 2020   • Elevated PSA 2020   • Obesity (BMI 30-39 9) 2020   • Annual physical exam 2020   • Nocturia 2020      LOS (days): 3  Geometric Mean LOS (GMLOS) (days): 3 50  Days to GMLOS:0 6     OBJECTIVE:    Risk of Unplanned Readmission Score: 11 92         Current admission status: Inpatient       Preferred Pharmacy:   [facility] [address] [place], [place] [address]  Phone: [phone] Fax: [phone]    Primary Care Provider: JANICE Ann    Primary Insurance: Brenton Acosta MA HA  Secondary Insurance:     ASSESSMENT:  Rico Downing Proxies    There are no active Health Care Proxies on file  Readmission Root Cause  30 Day Readmission: No    Patient Information  Admitted from[de-identified] Home  Mental Status: Alert  During Assessment patient was accompanied by: Not accompanied during assessment  Assessment information provided by[de-identified] Patient  Primary Caregiver: Self  Support Systems: Self, Family members  [place] of Residence: [address] [place] [address] do you live in?: [place] entry access options   Select all that apply : Stairs  Number of steps to enter home : 2  Do the steps have railings?: No  Type of Current Residence: 2 story home  Upon entering residence, is there a bedroom on the main floor (no further steps)?: No  A bedroom is located on the following floor levels of residence (select all that apply):: 2nd Floor  Upon entering residence, is there a bathroom on the main floor (no further steps)?: No  Indicate which floors of current residence have a bathroom (select all the apply):: 2nd Floor  Number of steps to 2nd floor from main floor: One Flight (15 steps)  In the last 12 months, was there a time when you were not able to pay the mortgage or rent on time?: No  In the last 12 months, was there a time when you did not have a steady place to sleep or slept in a shelter (including now)?: No  Homeless/housing insecurity resource given?: N/A  Living Arrangements: Lives w/ Extended Family, Other (Comment) (Lives with sister)  Is patient a ?: No    Activities of Daily Living Prior to Admission  Functional Status: Independent  Completes ADLs independently?: Yes  Does patient use assisted devices?: No  Does patient currently own DME?: No  Does patient have a history of Outpatient Therapy (PT/OT)?: Yes (Years ago   Does not remember name of outpatient facility )  Does the patient have a history of Short-Term Rehab?: No  Does patient have a history of [facility]?: No  Does patient currently have [place] [address]?: No         Patient Information Continued  Income Source: Unemployed  Does patient have prescription coverage?: Yes  Within the past 12 months, you worried that your food would run out before you got the money to buy more : Never true  Within the past 12 months, the food you bought just didn't last and you didn't have money to get more : Never true  Food insecurity resource given?: N/A  Does patient receive dialysis treatments?: No  Does patient have a history of substance abuse?: No  Does patient have a history of Mental Health Diagnosis?: No         Means of Transportation  Means of Transport to Appts[de-identified] Drives Self  In the past 12 months, has lack of transportation kept you from medical appointments or from getting medications?: No  In the past 12 months, has lack of transportation kept you from meetings, work, or from getting things needed for daily living?: No  Was application for public transport provided?: N/A        DISCHARGE DETAILS:    Discharge planning discussed with[de-identified] patient at bedside  Freedom of Choice: Yes  Comments - Freedom of Choice: No recommendations made at this time   Pending home O2 eval     Were Treatment Team discharge recommendations reviewed with patient/caregiver?: Yes  Did patient/caregiver verbalize understanding of patient care needs?: Yes  Were patient/caregiver advised of the risks associated with not following Treatment Team discharge recommendations?: Yes         Requested [address]         Is the patient interested in [facility] at discharge?: No    DME Referral Provided  Referral made for DME?: No    Other Referral/Resources/Interventions Provided:  Referral Comments: Pending home O2 eval

## 2022-11-04 NOTE — ASSESSMENT & PLAN NOTE
POA met sepsis criteria with tachycardia, elevated temperature, and elevated RR  · Lactic Acid POA 4 4, Procal POA 19 98  · UA: Leukocytes Large, Protein 2+, Occult Blood Large  · Urine microscopy: Innumerable WBC, Innumerable RBC  · Received 5L Plasmalyte in the ED  · Received Ceftriaxone in the ED  · Lactic acid improved      Plan:   Continue doxycycline for 4 more days  Follow-up with PCP in 1 week  Follow-up with urology

## 2022-11-04 NOTE — ASSESSMENT & PLAN NOTE
Patient reports he has a history of bladder spasms, which he currently appreciates    Takes Oxybutynin at home  Continue home medications  Follow-up with PCP

## 2022-11-04 NOTE — ASSESSMENT & PLAN NOTE
Patient is 59 yo male with a PMH of Hyperlipidemia, Urinary Retention 2/2 BPH, MOE, and Obesity who presents with complaints of acute onset fever and chills reported at home with Tmax 104F using home thermometer  · Patient s/p prostate biopsy

## 2022-11-04 NOTE — RESPIRATORY THERAPY NOTE
Home Oxygen Qualifying Test     Patient name: Soco Guardado        : 1960   Date of Test:  2022  Diagnosis:    Home Oxygen Test:    **Medicare Guidelines require item(s) 1-5 on all ambulatory patients or 1 and 2 on non-ambulatory patients  1  Baseline SPO2 on Room Air at rest  91%   a  If <= 88% on Room Air add O2 via NC to obtain SpO2 >=88%  If LPM needed, document LPM needed to reach =>88%    2  SPO2 during exertion on Room Air  90 %  a  During exertion monitor SPO2  If SPO2 increases >=89%, do not add supplemental oxygen    3  SPO2 on Oxygen at Rest  % at 0 LPM    4  SPO2 during exertion on Oxygen  % at 0LPM    5  Test performed during exertion activity  yes     []  Supplemental Home Oxygen is indicated  [x]  Client does not qualify for home oxygen  Respiratory Additional Notes-  Found on 4L NC  Removed oxygen to get an accurate sat  Was 91% RA  Pt walked and moved around for 6 minutes  Pts only concern was getting a leg bag for his urine instead of the big bag  Pt remained above 89% on RA  Says he feels fine   He did sound tachypneic during exertion but says that is his usual     Leartis Mary, RT

## 2022-11-04 NOTE — PLAN OF CARE
Problem: MOBILITY - ADULT  Goal: Maintain or return to baseline ADL function  Description: INTERVENTIONS:  -  Assess patient's ability to carry out ADLs; assess patient's baseline for ADL function and identify physical deficits which impact ability to perform ADLs (bathing, care of mouth/teeth, toileting, grooming, dressing, etc )  - Assess/evaluate cause of self-care deficits   - Assess range of motion  - Assess patient's mobility; develop plan if impaired  - Assess patient's need for assistive devices and provide as appropriate  - Encourage maximum independence but intervene and supervise when necessary  - Involve family in performance of ADLs  - Assess for home care needs following discharge   - Consider OT consult to assist with ADL evaluation and planning for discharge  - Provide patient education as appropriate  Outcome: Progressing  Goal: Maintains/Returns to pre admission functional level  Description: INTERVENTIONS:  - Perform BMAT or MOVE assessment daily    - Set and communicate daily mobility goal to care team and patient/family/caregiver     - Collaborate with rehabilitation services on mobility goals if consulted  - Out of bed for toileting  - Record patient progress and toleration of activity level   Outcome: Progressing     Problem: INFECTION - ADULT  Goal: Absence or prevention of progression during hospitalization  Description: INTERVENTIONS:  - Assess and monitor for signs and symptoms of infection  - Monitor lab/diagnostic results  - Monitor all insertion sites, i e  indwelling lines, tubes, and drains  - Monitor endotracheal if appropriate and nasal secretions for changes in amount and color  - [place] appropriate cooling/warming therapies per order  - Administer medications as ordered  - Instruct and encourage patient and family to use good hand hygiene technique  - Identify and instruct in appropriate isolation precautions for identified infection/condition  Outcome: Progressing  Goal: Absence of fever/infection during neutropenic period  Description: INTERVENTIONS:  - Monitor WBC    Outcome: Progressing     Problem: Potential for Falls  Goal: Patient will remain free of falls  Description: INTERVENTIONS:  - Educate patient/family on patient safety including physical limitations  - Instruct patient to call for assistance with activity   - Consult OT/PT to assist with strengthening/mobility   - Keep Call bell within reach  - Keep bed low and locked with side rails adjusted as appropriate  - Keep care items and personal belongings within reach  - Initiate and maintain comfort rounds  - Make Fall Risk Sign visible to staff  - Apply yellow socks and bracelet for high fall risk patients  - Consider moving patient to room near nurses station  Outcome: Progressing     Problem: RESPIRATORY - ADULT  Goal: Achieves optimal ventilation and oxygenation  Description: INTERVENTIONS:  - Assess for changes in respiratory status  - Assess for changes in mentation and behavior  - Position to facilitate oxygenation and minimize respiratory effort  - Oxygen administered by appropriate delivery if ordered  - Initiate smoking cessation education as indicated  - Encourage broncho-pulmonary hygiene including cough, deep breathe, Incentive Spirometry  - Assess the need for suctioning and aspirate as needed  - Assess and instruct to report SOB or any respiratory difficulty  - Respiratory Therapy support as indicated  Outcome: Progressing

## 2022-11-04 NOTE — NURSING NOTE
Patient is on continuous monitoring of his oxygen saturation while on room air for sleep study evaluation  The patient's saturation fluctuates mostly between 88% and 90%, occasionally goes up to 92% and also dropped to 86%  Patient is still been kept on room air to see if he can hold up his saturation during deep sleep

## 2022-11-04 NOTE — DISCHARGE INSTR - AVS FIRST PAGE
Dear Theresa Quintero,     It was our pleasure to care for you here at [facility]  It is our hope that we were always able to exceed the expected standards for your care during your stay  You were hospitalized due to sepsis  You were cared for on the 2nd floor by Tran Trotter under the service of Albino Robledo MD with the [facility]ist [facility] who covers for your primary care physician (PCP), JANICE Garcia, while you were hospitalized  If you have any questions or concerns related to this hospitalization, you may contact us at [phone]  For follow up as well as any medication refills, we recommend that you follow up with your primary care physician  A registered nurse will reach out to you by phone within a few days after your discharge to answer any additional questions that you may have after going home  However, at this time we provide for you here, the most important instructions / recommendations at discharge:     Notable Medication Adjustments -   Start taking doxycycline 50 mg 1 tablet by mouth 2 times a the 4 days  Testing Required after Discharge -   None  Important follow up information -   Follow-up with urology  Follow-up with PCP in 1 week  Other Instructions -   You are getting discharged with the urinary catheter and follow up with urology  Please review this entire after visit summary as additional general instructions including medication list, appointments, activity, diet, any pertinent wound care, and other additional recommendations from your care team that may be provided for you        Sincerely,     Tran Trotter MD

## 2022-11-04 NOTE — PLAN OF CARE
Problem: RESPIRATORY - ADULT  Goal: Achieves optimal ventilation and oxygenation  Description: INTERVENTIONS:  - Assess for changes in respiratory status  - Assess for changes in mentation and behavior  - Position to facilitate oxygenation and minimize respiratory effort  - Oxygen administered by appropriate delivery if ordered  - Initiate smoking cessation education as indicated  - Encourage broncho-pulmonary hygiene including cough, deep breathe, Incentive Spirometry  - Assess the need for suctioning and aspirate as needed  - Assess and instruct to report SOB or any respiratory difficulty  - Respiratory Therapy support as indicated  Outcome: Progressing     Problem: INFECTION - ADULT  Goal: Absence or prevention of progression during hospitalization  Description: INTERVENTIONS:  - Assess and monitor for signs and symptoms of infection  - Monitor lab/diagnostic results  - Monitor all insertion sites, i e  indwelling lines, tubes, and drains  - Monitor endotracheal if appropriate and nasal secretions for changes in amount and color  - [place] appropriate cooling/warming therapies per order  - Administer medications as ordered  - Instruct and encourage patient and family to use good hand hygiene technique  - Identify and instruct in appropriate isolation precautions for identified infection/condition  Outcome: Progressing     Problem: MOBILITY - ADULT  Goal: Maintain or return to baseline ADL function  Description: INTERVENTIONS:  -  Assess patient's ability to carry out ADLs; assess patient's baseline for ADL function and identify physical deficits which impact ability to perform ADLs (bathing, care of mouth/teeth, toileting, grooming, dressing, etc )  - Assess/evaluate cause of self-care deficits   - Assess range of motion  - Assess patient's mobility; develop plan if impaired  - Assess patient's need for assistive devices and provide as appropriate  - Encourage maximum independence but intervene and supervise when necessary  - Involve family in performance of ADLs  - Assess for home care needs following discharge   - Consider OT consult to assist with ADL evaluation and planning for discharge  - Provide patient education as appropriate  Outcome: Progressing     Problem: Potential for Falls  Goal: Patient will remain free of falls  Description: INTERVENTIONS:  - Educate patient/family on patient safety including physical limitations  - Instruct patient to call for assistance with activity   - Consult OT/PT to assist with strengthening/mobility   - Keep Call bell within reach  - Keep bed low and locked with side rails adjusted as appropriate  - Keep care items and personal belongings within reach  - Initiate and maintain comfort rounds  - Make Fall Risk Sign visible to staff  - Offer Toileting every two hours, in advance of need  - Initiate/Maintain bed and chair alarm  - Obtain necessary fall risk management equipment:   - Apply yellow socks and bracelet for high fall risk patients  - Consider moving patient to room near nurses station  Outcome: Progressing

## 2022-11-04 NOTE — ASSESSMENT & PLAN NOTE
Elevated Cr of 1 68 on arrival, likely pre-renal  Creatinine improved with IV fluids    Plan:  Follow-up with PCP

## 2022-11-06 LAB
BACTERIA BLD CULT: NORMAL
BACTERIA BLD CULT: NORMAL

## 2022-11-07 NOTE — UTILIZATION REVIEW
NOTIFICATION OF ADMISSION DISCHARGE   This is a Notification of Discharge from [facility]  Please be advised that this patient has been discharge from our facility  Below you will find the admission and discharge date and time including the patient’s disposition  UTILIZATION REVIEW CONTACT:  Tracie Sahni MA  Utilization   Network Utilization Review Department  Phone: [phone] x carefully listen to the prompts  All voicemails are confidential   Email: [email or web address]     ADMISSION INFORMATION  PRESENTATION DATE: 11/1/2022  4:37 PM  OBERVATION ADMISSION DATE:   INPATIENT ADMISSION DATE: 11/1/22  7:04 PM   DISCHARGE DATE: 11/4/2022  4:34 PM  DISPOSITION: Home/Self Care Home/Self Care      IMPORTANT INFORMATION:  Send all requests for admission clinical reviews, approved or denied determinations and any other requests to dedicated fax number below belonging to the campus where the patient is receiving treatment   List of dedicated fax numbers:  [address] DENIALS (Administrative/Medical Necessity) [phone]   [address] (Maternity/NICU/Pediatrics) [phone]   [facility] [phone]   [facility] 87 [phone]   [address] [phone]   [address] [facility] [place] [phone] 90 [facility] [phone]   [address] [place], [place] [address] [phone]   [facility]  [phone]   [facility] [phone]   [address] [place] [address] 850 [facility] [phone]

## 2022-11-18 ENCOUNTER — OFFICE VISIT (OUTPATIENT)
Dept: UROLOGY | Facility: CLINIC | Age: 62
End: 2022-11-18

## 2022-11-18 VITALS
BODY MASS INDEX: 37.12 KG/M2 | OXYGEN SATURATION: 95 % | HEIGHT: 69 IN | WEIGHT: 250.6 LBS | RESPIRATION RATE: 16 BRPM | HEART RATE: 82 BPM | DIASTOLIC BLOOD PRESSURE: 78 MMHG | SYSTOLIC BLOOD PRESSURE: 124 MMHG

## 2022-11-18 DIAGNOSIS — N40.1 URINARY RETENTION DUE TO BENIGN PROSTATIC HYPERPLASIA: Primary | ICD-10-CM

## 2022-11-18 DIAGNOSIS — R33.8 URINARY RETENTION DUE TO BENIGN PROSTATIC HYPERPLASIA: Primary | ICD-10-CM

## 2022-11-18 DIAGNOSIS — N32.89 BLADDER SPASMS: ICD-10-CM

## 2022-11-18 DIAGNOSIS — R97.20 ELEVATED PSA: ICD-10-CM

## 2022-11-18 DIAGNOSIS — R33.9 URINARY RETENTION: ICD-10-CM

## 2022-11-18 RX ORDER — FINASTERIDE 5 MG/1
5 TABLET, FILM COATED ORAL DAILY
Qty: 90 TABLET | Refills: 3 | Status: SHIPPED | OUTPATIENT
Start: 2022-11-18 | End: 2023-02-16

## 2022-11-18 RX ORDER — ACETAMINOPHEN 325 MG/1
975 TABLET ORAL ONCE
OUTPATIENT
Start: 2022-11-18 | End: 2022-11-18

## 2022-11-18 RX ORDER — TAMSULOSIN HYDROCHLORIDE 0.4 MG/1
0.4 CAPSULE ORAL
Qty: 90 CAPSULE | Refills: 3 | Status: SHIPPED | OUTPATIENT
Start: 2022-11-18 | End: 2023-02-16

## 2022-11-18 RX ORDER — OXYBUTYNIN CHLORIDE 15 MG/1
15 TABLET, EXTENDED RELEASE ORAL
Qty: 30 TABLET | Refills: 0 | Status: SHIPPED | OUTPATIENT
Start: 2022-11-18 | End: 2022-12-18

## 2022-11-18 NOTE — PROGRESS NOTES
Assessment/Plan:    Urinary retention due to benign prostatic hyperplasia  We had plan for HoLEP surgery but canceled because of MRI showing a lesion which has now been biopsied and appears negative  Therefore we should move forward again with HoLEP surgery  We have previously discussed robotic surgery as well as prostate artery embolization but given his history of surgical mesh and his prostate size and retention I think HoLEP is the best option  We discussed risks and rediscussed him today:    The risks including:  Bleeding requiring blood transfusion and/or take back surgery to address  Conversion to a TURP or open surgery (open simple prostatectomy)  Absorption of a high volume of fluids during case which can result in ICU stay and prolonged intubation  Damage to the bladder, ureteral orifices and scar tissue formation along the lower urinary tract  Daniels catheter for prolonged duration  An almost guaranteed issue with stress urinary incontinence that usually takes weeks to months to improve  Prostate regrowth over time    Consent was obtained    Elevated PSA  Status post negative fusion biopsy after MRI suggested right-sided lesion  We discussed that his lesion was difficult to access via transperineal approach so his possible that he has a false negative but I think we should work off the data we have  If he is found to have prostate cancer through pathology on HoLEP will consider subsequent radiation therapy          Subjective:      Patient ID: Laura Higgins is a 58 y o  male  HPI     Arbradyaubree  a 62 y o  gentleman with hx of gross hematuria and recurrent catheter dependent urinary retention        The patient has been catheter dependent for approximately 2 months  He has also had issues in the past usually precipitated by alcohol  He recently had a trial of void which he again failed  He hates his catheter    He had cysto showing bilobar hypertrophy (no median lobe) and TRUS volume of 108cc   He was recently started on finasteride (already on Flomax)  Reports he has had more issues with bladder spasms and pericatheter leakage since his last catheter was placed  As result he says he has cut down on drinking      Regarding his PSA he does have a history of negative prostate biopsy in the past but we do not have access to the records  His last PSA was 4 5 in 2020, repeat was up to 5  6   we elected to obtain an MRI which showed 106 cc gland with PI-RADS 4 lesion measuring 3 cm on the right side  His father had prostate cancer  Based on PSA and MRI findings elected for MRI guided fusion biopsy which was fortunately negative but unfortunately he developed post biopsy suspected urosepsis(although blood and urine cultures were negative) requiring hospitalization  He is overall doing better now  His prior pain with catheter has improved      Also with angiokertoma of scrotum      He is not on blood thinners does take a lot of NSAIDs because of discomfort with catheterization     Abdominal surgical history is significant for appendectomy and umbilical mesh for hernia      Pt is here with his sister  Past Surgical History:   Procedure Laterality Date   • APPENDECTOMY     • CLAVICLE FRACTURE REPAIR     • CYST REMOVAL     • HERNIA REPAIR     • ME BIOPSY OF PROSTATE,NEEDLE,TRANSPERINEAL N/A 11/1/2022    Procedure: Thedore [place] BIOPSY PROSTATE;  Surgeon: Yolie Sherman MD;  Location: [facility];  Service: Urology   • UVULECTOMY          Past Medical History:   Diagnosis Date   • Enlarged prostate    • Sleep apnea              Review of Systems   Constitutional: Negative for chills and fever  HENT: Negative for ear pain and sore throat  Eyes: Negative for pain and visual disturbance  Respiratory: Negative for cough and shortness of breath  Cardiovascular: Negative for chest pain and palpitations  Gastrointestinal: Negative for abdominal pain and vomiting     Genitourinary: Positive for difficulty urinating  Negative for dysuria and hematuria  Musculoskeletal: Negative for arthralgias and back pain  Skin: Negative for color change and rash  Neurological: Negative for seizures and syncope  All other systems reviewed and are negative  Objective:      /78 (BP Location: Left arm, Patient Position: Sitting, Cuff Size: Large)   Pulse 82   Resp 16   Ht 5' 9" (1 753 m)   Wt 114 kg (250 lb 9 6 oz)   SpO2 95%   BMI 37 01 kg/m²     Lab Results   Component Value Date    PSA 5 6 (H) 09/30/2022    PSA 4 5 (H) 12/09/2020          Physical Exam  Vitals reviewed  Constitutional:       Appearance: Normal appearance  He is normal weight  HENT:      Head: Normocephalic and atraumatic  Eyes:      Pupils: Pupils are equal, round, and reactive to light  Abdominal:      General: Abdomen is flat  Genitourinary:     Comments: Catheter in place draining yellow urine  Neurological:      General: No focal deficit present  Mental Status: He is alert and oriented to person, place, and time  Psychiatric:         Mood and Affect: Mood normal          Thought Content: Thought content normal              Universal Protocol:  Consent: Verbal consent obtained    Consent given by: patient  Patient understanding: patient states understanding of the procedure being performed  Patient consent: the patient's understanding of the procedure matches consent given  Patient identity confirmed: verbally with patient      Bladder catheterization    Date/Time: 11/18/2022 10:53 AM  Performed by: Lauren Nance MD  Authorized by: Lauren Nance MD     Consent:     Consent given by:  Patient  Universal protocol:     Procedure explained and questions answered to patient or proxy's satisfaction: yes      Patient identity confirmed:  Verbally with patient  Pre-procedure details:     Procedure purpose:  Therapeutic  Procedure details:     Catheter insertion:  Indwelling    Approach: natural orifice Catheter type: Daniels    Catheter size:  18 Fr    Number of attempts:  1    Successful placement: yes      Urine characteristics:  Clear  Post-procedure details:     Patient tolerance of procedure: Tolerated well, no immediate complications  Comments:      Patient's catheter was exchanged using standard sterile technique without issues      Final Diagnosis   A  Prostate, R post med x 2:  - Benign prostate glands and stroma with focal acute inflammation      B  Prostate, R post lat x 2:  - Benign prostate glands and stroma      C  Prostate, R base x 2:  - Benign prostate glands and stroma      D  Prostate, L post med x 2:  - Benign prostate glands and stroma with focal chronic inflammation      E  Prostate, L post lat x 2:  - Benign prostate glands and stroma      F  Prostate, L base x 2:  - Benign prostate glands and stroma       G  Prostate, L ant med x 2:  - Benign prostate glands and stroma with marked chronic, focal acute inflammation       Comment: Immunohistochemistry is performed on tissue block G2 to help in the assessment of this case  AE1/3 is negative, MUM-1 highlights plasma cells and in situ hybridization (DAVID) shows no kappa or lambda light chain restriction  Findings favor a reactive change  IgG/IgG4 is pending and result will be issued in an addendum      H  Prostate, L ant lat x 2:  - Benign prostate glands and stroma      I  Prostate, R ant lat x 2:  - Benign prostate glands and stroma      J  Prostate, R ant med x 2:  - Benign prostate glands and stroma       K  Prostate, R Lat HUONG x 7:  - Benign prostate glands and stroma             Orders  No orders of the defined types were placed in this encounter

## 2022-11-18 NOTE — ASSESSMENT & PLAN NOTE
We had plan for HoLEP surgery but canceled because of MRI showing a lesion which has now been biopsied and appears negative  Therefore we should move forward again with HoLEP surgery  We have previously discussed robotic surgery as well as prostate artery embolization but given his history of surgical mesh and his prostate size and retention I think HoLEP is the best option    We discussed risks and rediscussed him today:    The risks including:  Bleeding requiring blood transfusion and/or take back surgery to address  Conversion to a TURP or open surgery (open simple prostatectomy)  Absorption of a high volume of fluids during case which can result in ICU stay and prolonged intubation  Damage to the bladder, ureteral orifices and scar tissue formation along the lower urinary tract  Daniels catheter for prolonged duration  An almost guaranteed issue with stress urinary incontinence that usually takes weeks to months to improve  Prostate regrowth over time    Consent was obtained

## 2022-11-18 NOTE — ASSESSMENT & PLAN NOTE
Status post negative fusion biopsy after MRI suggested right-sided lesion  We discussed that his lesion was difficult to access via transperineal approach so his possible that he has a false negative but I think we should work off the data we have    If he is found to have prostate cancer through pathology on HoLEP will consider subsequent radiation therapy

## 2022-11-30 NOTE — TELEPHONE ENCOUNTER
Called and spoke with patient  States temp is 103 9 about 20 minutes ago  Patient has not tried anything for it  States started feeling bad around 12:30, had chills and shaking  Also reports some nausea but no vomiting  Took temp while on the phone and it was 103  6  Pt advised to go to ED  Sister reports will take him to [facility] ED  Message forwarded to provider for FYI  oral

## 2022-12-12 ENCOUNTER — TELEPHONE (OUTPATIENT)
Dept: UROLOGY | Facility: AMBULATORY SURGERY CENTER | Age: 62
End: 2022-12-12

## 2022-12-12 NOTE — TELEPHONE ENCOUNTER
I called pt to discuss scheduling his procedure with Dr Shawn Smyth at the [facility]  There was no answer so I did leave a voicemail asking pt to call our office back to discuss

## 2022-12-14 NOTE — TELEPHONE ENCOUNTER
I returned call to pt to discuss scheduling his procedure with Dr Connie Lomax at the [facility] on 2/14/2023  Pt confirmed that he will take this date if it is the soonest that we have  I then verbally went over all of pt 's pre op instructions and prep information with pt  He is aware that he needs to have his pre op testing done 2 weeks prior to surgery along with a cardiac clearance  Pt confirmed that he has never seen a cardiologist and asked that I schedule him for a new patient appt  Pt was also requesting to have a appt with Dr Connie Lomax prior to surgery to re-discuss the procedure plan  Pt then started to ask questions regarding his catheter and wanted to know when it will be changed again  Pt stated that it has not been changed since 11/1/2023  I informed pt that I will have someone from our clinical staff reach out to him to discuss scheduling this appt  Per pt 's request I will be mailing him a copy of his surgical packet and instructed him to call our office with any other questions or concerns regarding this procedure

## 2022-12-14 NOTE — TELEPHONE ENCOUNTER
Patient called back to schedule surgery with Dr Beto Polk        Patient can be reached at [phone]

## 2022-12-15 ENCOUNTER — PREP FOR PROCEDURE (OUTPATIENT)
Dept: UROLOGY | Facility: AMBULATORY SURGERY CENTER | Age: 62
End: 2022-12-15

## 2022-12-15 DIAGNOSIS — N40.1 URINARY RETENTION DUE TO BENIGN PROSTATIC HYPERPLASIA: Primary | ICD-10-CM

## 2022-12-15 DIAGNOSIS — R39.89 SUSPECTED UTI: ICD-10-CM

## 2022-12-15 DIAGNOSIS — R33.8 URINARY RETENTION DUE TO BENIGN PROSTATIC HYPERPLASIA: Primary | ICD-10-CM

## 2022-12-15 DIAGNOSIS — Z01.812 PRE-OPERATIVE LABORATORY EXAMINATION: ICD-10-CM

## 2022-12-21 ENCOUNTER — PROCEDURE VISIT (OUTPATIENT)
Dept: UROLOGY | Facility: CLINIC | Age: 62
End: 2022-12-21

## 2022-12-21 VITALS — BODY MASS INDEX: 37.01 KG/M2 | HEIGHT: 69 IN

## 2022-12-21 DIAGNOSIS — Z97.8 FOLEY CATHETER IN PLACE: Primary | ICD-10-CM

## 2022-12-21 NOTE — PROGRESS NOTES
12/21/2022  Brenden Durbin is a 58 y o  male  [id number]    Diagnosis:  Chief Complaint    Urinary Retention         Patient presents for routine Daniels catheter exchange managed by Dr Reagan Roberson:  Next Daniels catheter exchange in 5 wk  Patient instructed to call with any questions or concerns in the meantime  No orders of the defined types were placed in this encounter  Vitals:    12/21/22 1312   Height: 5' 9" (1 753 m)       Patient's Daniels catheter was removed after deflation of an intact balloon  Patient tolerated the removal well  Procedure:    Universal Protocol:  Consent: Verbal consent obtained  Consent given by: patient  Patient understanding: patient states understanding of the procedure being performed  Patient identity confirmed: verbally with patient      Bladder catheterization    Date/Time: 12/21/2022 1:42 PM  Performed by: Iraida Fish RN  Authorized by: Eliza Jean MD     Patient location:  Bedside  Consent:     Consent given by:  Patient  Universal protocol:     Procedure explained and questions answered to patient or proxy's satisfaction: yes      Patient identity confirmed:  Verbally with patient  Pre-procedure details:     Procedure purpose:  Therapeutic    Preparation: Patient was prepped and draped in usual sterile fashion    Anesthesia (see MAR for exact dosages): Anesthesia method:  None  Procedure details:     Bladder irrigation: no      Catheter insertion:  Indwelling    Approach: natural orifice      Catheter type:  Coude, Daniels and latex    Catheter size:  16 Fr    Number of attempts:  1    Successful placement: yes      Urine characteristics:  Blood-tinged  Post-procedure details:     Patient tolerance of procedure: Tolerated well, no immediate complications  Comments:      Patient had provided Bard type valve that he wanted attached between the catheter tubing and leg bag  Did as patient requested    He does not use the leg straps and was securing the catheter from his belt, which he did himself  Patient was questioning what he can do to assist in helping him urinate volitionally once he has surgery  Informed him there is not anything he can do right now  He can do Kegel muscle strengthening once he no longer has a Daniels catheter, but performing them now will not do anything to help him urinate after surgery  His next Daniels change was scheduled for Nadir in 5 wk  He was provided with extra leg bags, no stat locks as patient does not use them              Kelsey Scales RN,BSN

## 2023-01-03 PROBLEM — A41.9 SEPSIS (HCC): Status: RESOLVED | Noted: 2022-11-02 | Resolved: 2023-01-03

## 2023-01-05 DIAGNOSIS — N32.89 BLADDER SPASMS: ICD-10-CM

## 2023-01-05 NOTE — PROGRESS NOTES
Outpatient Cardiology Consult Note - Shree Rater 58 y o  male MRN: [id number]    @ Encounter: [id number]      Patient Active Problem List    Diagnosis Date Noted   • Sleep apnea 11/01/2022   • Bladder spasms 09/30/2022   • Gross hematuria 09/07/2022   • Angiokeratoma of scrotum 09/07/2022   • Urinary retention due to benign prostatic hyperplasia 09/06/2022   • Daniels catheter in place 09/06/2022   • Encounter to discuss test results 09/06/2022   • Hyperlipidemia 12/22/2020   • Elevated PSA 12/22/2020   • Obesity (BMI 30-39 9) 12/22/2020   • Annual physical exam 12/08/2020   • Nocturia 12/08/2020     Assessment:  58 y o  male Hx per chart p/w cardiac clearance before HoLEP surgery for BPH  [facility] Sarah DC 11/4/22 for Severe Sepsis after prostate bx secondary to acute cystitis, prior history of ESBL E coli infection, received ertapenem, sepsis resolved    cardiac clearance before HoLEP surgery for BPH on 2/14/23  Obese BMI 37; has lost weight 260lbs down to 198lbs with crash diets in past, harg[place] back  etoh abuse  BPH, catheter dependant  Heavy nsaid use  HLD, Tx by TLC no meds  Neck ductal cyst, caused major neck thickening requiring neck surgery and thyroid surgery at age 48; post op, uvula expanded and required second surgery to remove uvula  Difficult intubation in past, before prior neck surgery  35  Pack yr smoker, quit 15 yr ago  Remote heavy etoh abuse, quit over 15 yr ago  No FH cardiac dz      Cr wnl    TODAY's PLAN:  Warm, euvolemic, appears comfortable, obese, thick neck    Pre op eval:  - the patient is low-moderate risk for non-cardiac surgery  - the patient likely can complete 4 mets exertion without issue, walks 25 stairs multiple times daily without symptoms  - no evidence of acute cardiac issue including decompensated HF, uncontrolled arrhythmia, severe valvulopathy, or an ACS  - the patient is currently optimized from cardiac perspective  - no further cardiac testing planned at this time  - continue current home medications with any oscar-op modifications per anesthesia/surgery teams  - given his habitus, suspected but undiagnosed MOE, past major neck surgeries, may be a difficult airway and/or have respiratory insufficiency; close periop monitoring and precautions advised     suspected but undiagnosed MOE; advise outpt sleep study with PCP    Discussed therapeutic lifestyle changes, low-moderate intensity exercise, maintain acceptable hydration 64 oz water daily, salt restrict, Mediterranean diet, weight loss    No etoh or tobacco x 10-15 yrs    Studies:    EKG - my read:  1/6/23: nsr, 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), 1oAVB, no acute ischemic STT changes      HPI:   58 y o  male Hx per chart p/w cardiac clearance before HoLEP surgery for BPH  Feels well, no new decreased energy, breathing issues, limitations to activities since his last hospitalization  No new CP/SOB/dizziness/palpitations/syncope  No new leg swelling, PND, pillow orthopnea, unintentional weight changes, fatigue  No new fevers, chills, cough, nausea, vomiting, diarrhea, dysuria        Past Medical History:   Diagnosis Date   • Enlarged prostate    • Sleep apnea        ROS:  10 point ROS negative except as specified in HPI    Allergies   Allergen Reactions   • Keflex [Cephalexin]        Current Outpatient Medications   Medication Instructions   • finasteride (PROSCAR) 5 mg, Oral, Daily   • lidocaine (XYLOCAINE) 2 % topical gel Topical, As needed   • oxybutynin (DITROPAN XL) 15 mg, Oral, Daily at bedtime   • tamsulosin (FLOMAX) 0 4 mg, Oral, Daily with dinner        Social History     Socioeconomic History   • Marital status: Single     Spouse name: Not on file   • Number of children: Not on file   • Years of education: Not on file   • Highest education level: Not on file   Occupational History   • Not on file   Tobacco Use   • Smoking status: Former     Types: Cigarettes   • Smokeless tobacco: Never   • Tobacco comments:     rarely - one a week or so Vaping Use   • Vaping Use: Every day   • Substances: Nicotine   Substance and Sexual Activity   • Alcohol use: Not Currently     Comment: occ   • Drug use: Yes     Types: Marijuana     Comment: occ   • Sexual activity: Not Currently   Other Topics Concern   • Not on file   Social History Narrative   • Not on file     Social Determinants of Health     Financial Resource Strain: Not on file   Food Insecurity: No Food Insecurity   • Worried About Running Out of Food in the Last Year: Never true   • Ran Out of Food in the Last Year: Never true   Transportation Needs: No Transportation Needs   • Lack of Transportation (Medical): No   • Lack of Transportation (Non-Medical):  No   Physical Activity: Not on file   Stress: Not on file   Social Connections: Not on file   Intimate Partner Violence: Not on file   Housing Stability: Unknown   • Unable to Pay for Housing in the Last Year: No   • Number of Places Lived in the Last Year: Not on file   • Unstable Housing in the Last Year: No       Family History   Problem Relation Age of Onset   • Dementia Mother    • Prostate cancer Father        Physical Exam:  Vitals:    01/06/23 1544   BP: 126/76   BP Location: Left arm   Patient Position: Sitting   Cuff Size: Large   Pulse: 73   SpO2: 95%   Weight: 119 kg (262 lb)   Height: 5' 9" (1 753 m)     Constitutional: NAD, non toxic, obese, thick neck  Ears/nose/mouth/throat: atraumatic  CV: RRR, no JVD  Resp: ctabl  GI: Soft, NTND  MSK: no swollen joints in exposed areas  Extr: No edema, warm LE  Pysche: Normal affect  Neuro: appropriate in conversation  Skin: dry and intact in exposed areas    Labs & Results:    Lab Results   Component Value Date    WBC 5 32 11/04/2022    HGB 10 2 (L) 11/04/2022    HCT 31 9 (L) 11/04/2022    MCV 87 11/04/2022     (L) 11/04/2022     Lab Results   Component Value Date    SODIUM 136 11/04/2022    K 4 4 11/04/2022     11/04/2022    CO2 25 11/04/2022    BUN 15 11/04/2022    CREATININE 0 95 11/04/2022    GLUC 68 11/04/2022    CALCIUM 8 6 11/04/2022     No results found for: BNP   Lab Results   Component Value Date    CHOLESTEROL 235 (H) 12/09/2020     Lab Results   Component Value Date    HDL 48 12/09/2020     Lab Results   Component Value Date    TRIG 112 12/09/2020     No results found for: Elder    Counseling / Coordination of Care  Time spent today 47 minutes  Greater than 50% of total time was spent with the patient and / or family counseling and / or coordination of care  We discussed diagnoses, most recent studies and any changes in treatment  Thank you for the opportunity to participate in the care of this patient      Caroline Pimentel MD  Attending Physician  Advanced Heart Failure and Transplant Cardiology  [facility]

## 2023-01-06 ENCOUNTER — OFFICE VISIT (OUTPATIENT)
Dept: CARDIOLOGY CLINIC | Facility: CLINIC | Age: 63
End: 2023-01-06

## 2023-01-06 VITALS
BODY MASS INDEX: 38.8 KG/M2 | WEIGHT: 262 LBS | HEIGHT: 69 IN | SYSTOLIC BLOOD PRESSURE: 126 MMHG | DIASTOLIC BLOOD PRESSURE: 76 MMHG | HEART RATE: 73 BPM | OXYGEN SATURATION: 95 %

## 2023-01-06 DIAGNOSIS — E78.5 HYPERLIPIDEMIA, UNSPECIFIED HYPERLIPIDEMIA TYPE: ICD-10-CM

## 2023-01-06 DIAGNOSIS — G47.30 SLEEP APNEA, UNSPECIFIED TYPE: ICD-10-CM

## 2023-01-06 DIAGNOSIS — E66.9 OBESITY, UNSPECIFIED CLASSIFICATION, UNSPECIFIED OBESITY TYPE, UNSPECIFIED WHETHER SERIOUS COMORBIDITY PRESENT: ICD-10-CM

## 2023-01-06 DIAGNOSIS — Z01.810 PREOP CARDIOVASCULAR EXAM: Primary | ICD-10-CM

## 2023-01-06 RX ORDER — OXYBUTYNIN CHLORIDE 15 MG/1
15 TABLET, EXTENDED RELEASE ORAL
Qty: 30 TABLET | Refills: 0 | Status: SHIPPED | OUTPATIENT
Start: 2023-01-06 | End: 2023-02-05

## 2023-01-11 ENCOUNTER — TELEPHONE (OUTPATIENT)
Dept: OTHER | Facility: OTHER | Age: 63
End: 2023-01-11

## 2023-01-11 NOTE — TELEPHONE ENCOUNTER
Pt called stating Dr Abimbola St should have sent over all the info that was needed by the office prior to his surgery on 2/14  He also wanted to let the office know he has scar tissue in his throat  Equal and normal pulses (carotid, femoral, dorsalis pedis)

## 2023-01-12 NOTE — TELEPHONE ENCOUNTER
Returned pt 's phone call and informed him that I did receive his cardiac clearance and I informed him that he needs to have his lab work done around 1/31 and he will be good to go for surgery  Pt thanked me for returning his call and was instructed to call our office with any questions or concerns regarding his upcoming procedure

## 2023-01-12 NOTE — TELEPHONE ENCOUNTER
Pt called and wanted to know if there was anything else he need to have done before surgery       Pt would like to have a call back to discuss    Pt can be reached at [phone]

## 2023-01-24 ENCOUNTER — TELEPHONE (OUTPATIENT)
Dept: UROLOGY | Facility: MEDICAL CENTER | Age: 63
End: 2023-01-24

## 2023-01-25 ENCOUNTER — PROCEDURE VISIT (OUTPATIENT)
Dept: UROLOGY | Facility: CLINIC | Age: 63
End: 2023-01-25

## 2023-01-25 VITALS — DIASTOLIC BLOOD PRESSURE: 84 MMHG | SYSTOLIC BLOOD PRESSURE: 140 MMHG

## 2023-01-25 DIAGNOSIS — R33.9 URINARY RETENTION: Primary | ICD-10-CM

## 2023-01-25 NOTE — PROGRESS NOTES
1/25/2023  Brenden Durbin is a 58 y o  male  [id number]    Diagnosis:  Chief Complaint    Urinary Retention         Patient presents for routine Daniels exchange managed by Dr Reagan Roberson:  Surgery on 2/14/23  Patient instructed to call with any questions or concerns in the meantime  Orders Placed This Encounter   Procedures   • Urine culture        Vitals:    01/25/23 1251   BP: 140/84   BP Location: Right arm   Patient Position: Sitting     Patient's current Daniels catheter was removed after deflation of an intact balloon without incident  Procedure:    Universal Protocol:  Consent: Verbal consent obtained  Risks and benefits: risks, benefits and alternatives were discussed  Consent given by: patient  Patient identity confirmed: verbally with patient      Bladder catheterization    Date/Time: 1/25/2023 1:13 PM  Performed by: Iraida Fish RN  Authorized by: Eliza Jean MD     Patient location:  Bedside  Consent:     Consent given by:  Patient  Universal protocol:     Patient identity confirmed:  Verbally with patient  Pre-procedure details:     Procedure purpose:  Therapeutic    Preparation: Patient was prepped and draped in usual sterile fashion    Anesthesia (see MAR for exact dosages): Anesthesia method:  None  Procedure details:     Bladder irrigation: no      Catheter insertion:  Indwelling    Approach: natural orifice      Catheter type:  Coude, Daniels and latex    Catheter size:  16 Fr    Number of attempts:  1    Successful placement: yes      Urine characteristics:  Clear  Post-procedure details:     Patient tolerance of procedure: Tolerated well, no immediate complications  Comments:      After urine specimen was taken from new Daniels catheter, it was attached to leg bag, however, patient has his own way of connected the leg bag to his belt  He did this himself  He was provided extra leg bags and will call patient with urine results once available    He understands if there is anything further he needs for surgery, he will be contacted by surgery scheduler              Lukas Ortega RN ,BSN

## 2023-01-28 LAB
BACTERIA UR CULT: ABNORMAL
BACTERIA UR CULT: ABNORMAL

## 2023-01-30 ENCOUNTER — LAB REQUISITION (OUTPATIENT)
Dept: LAB | Facility: HOSPITAL | Age: 63
End: 2023-01-30

## 2023-01-30 ENCOUNTER — APPOINTMENT (OUTPATIENT)
Dept: LAB | Facility: CLINIC | Age: 63
End: 2023-01-30

## 2023-01-30 DIAGNOSIS — Z01.812 PRE-OPERATIVE LABORATORY EXAMINATION: ICD-10-CM

## 2023-01-30 DIAGNOSIS — R39.89 SUSPECTED UTI: ICD-10-CM

## 2023-01-30 DIAGNOSIS — N40.1 BENIGN PROSTATIC HYPERPLASIA WITH LOWER URINARY TRACT SYMPTOMS: ICD-10-CM

## 2023-01-30 DIAGNOSIS — N40.1 URINARY RETENTION DUE TO BENIGN PROSTATIC HYPERPLASIA: ICD-10-CM

## 2023-01-30 DIAGNOSIS — R33.8 URINARY RETENTION DUE TO BENIGN PROSTATIC HYPERPLASIA: ICD-10-CM

## 2023-01-30 LAB
ABO GROUP BLD: NORMAL
ANION GAP SERPL CALCULATED.3IONS-SCNC: 6 MMOL/L (ref 4–13)
BASOPHILS # BLD AUTO: 0.08 THOUSANDS/ÂΜL (ref 0–0.1)
BASOPHILS NFR BLD AUTO: 1 % (ref 0–1)
BLD GP AB SCN SERPL QL: NEGATIVE
BUN SERPL-MCNC: 20 MG/DL (ref 5–25)
CALCIUM SERPL-MCNC: 9.7 MG/DL (ref 8.3–10.1)
CHLORIDE SERPL-SCNC: 108 MMOL/L (ref 96–108)
CO2 SERPL-SCNC: 24 MMOL/L (ref 21–32)
CREAT SERPL-MCNC: 1.01 MG/DL (ref 0.6–1.3)
EOSINOPHIL # BLD AUTO: 0.24 THOUSAND/ÂΜL (ref 0–0.61)
EOSINOPHIL NFR BLD AUTO: 4 % (ref 0–6)
ERYTHROCYTE [DISTWIDTH] IN BLOOD BY AUTOMATED COUNT: 13.7 % (ref 11.6–15.1)
GFR SERPL CREATININE-BSD FRML MDRD: 79 ML/MIN/1.73SQ M
GLUCOSE P FAST SERPL-MCNC: 98 MG/DL (ref 65–99)
HCT VFR BLD AUTO: 43.8 % (ref 36.5–49.3)
HGB BLD-MCNC: 13.9 G/DL (ref 12–17)
IMM GRANULOCYTES # BLD AUTO: 0.08 THOUSAND/UL (ref 0–0.2)
IMM GRANULOCYTES NFR BLD AUTO: 1 % (ref 0–2)
LYMPHOCYTES # BLD AUTO: 1.88 THOUSANDS/ÂΜL (ref 0.6–4.47)
LYMPHOCYTES NFR BLD AUTO: 28 % (ref 14–44)
MCH RBC QN AUTO: 28 PG (ref 26.8–34.3)
MCHC RBC AUTO-ENTMCNC: 31.7 G/DL (ref 31.4–37.4)
MCV RBC AUTO: 88 FL (ref 82–98)
MONOCYTES # BLD AUTO: 0.52 THOUSAND/ÂΜL (ref 0.17–1.22)
MONOCYTES NFR BLD AUTO: 8 % (ref 4–12)
NEUTROPHILS # BLD AUTO: 3.82 THOUSANDS/ÂΜL (ref 1.85–7.62)
NEUTS SEG NFR BLD AUTO: 58 % (ref 43–75)
NRBC BLD AUTO-RTO: 0 /100 WBCS
PLATELET # BLD AUTO: 219 THOUSANDS/UL (ref 149–390)
PMV BLD AUTO: 10.3 FL (ref 8.9–12.7)
POTASSIUM SERPL-SCNC: 4.5 MMOL/L (ref 3.5–5.3)
RBC # BLD AUTO: 4.96 MILLION/UL (ref 3.88–5.62)
RH BLD: POSITIVE
SODIUM SERPL-SCNC: 138 MMOL/L (ref 135–147)
SPECIMEN EXPIRATION DATE: NORMAL
WBC # BLD AUTO: 6.62 THOUSAND/UL (ref 4.31–10.16)

## 2023-01-31 DIAGNOSIS — N39.0 URINARY TRACT INFECTION WITHOUT HEMATURIA, SITE UNSPECIFIED: Primary | ICD-10-CM

## 2023-01-31 RX ORDER — NITROFURANTOIN 25; 75 MG/1; MG/1
100 CAPSULE ORAL 2 TIMES DAILY
Qty: 14 CAPSULE | Refills: 0 | Status: SHIPPED | OUTPATIENT
Start: 2023-01-31 | End: 2023-02-07

## 2023-02-01 LAB — BACTERIA UR CULT: NORMAL

## 2023-02-04 ENCOUNTER — TELEPHONE (OUTPATIENT)
Dept: UROLOGY | Facility: AMBULATORY SURGERY CENTER | Age: 63
End: 2023-02-04

## 2023-02-07 DIAGNOSIS — N32.89 BLADDER SPASMS: ICD-10-CM

## 2023-02-07 RX ORDER — OXYBUTYNIN CHLORIDE 15 MG/1
15 TABLET, EXTENDED RELEASE ORAL
Qty: 30 TABLET | Refills: 0 | Status: SHIPPED | OUTPATIENT
Start: 2023-02-07 | End: 2023-02-16

## 2023-02-07 NOTE — PRE-PROCEDURE INSTRUCTIONS
Pre-Surgery Instructions:   Medication Instructions   • finasteride (PROSCAR) 5 mg tablet Take night before surgery   • Multiple Vitamins-Minerals (EMERGEN-C IMMUNE PO) Stop taking 7 days prior to surgery  • oxybutynin (DITROPAN XL) 15 MG 24 hr tablet Take night before surgery   • tamsulosin (FLOMAX) 0 4 mg Take night before surgery    [facility] preop instructions reviewed with pt  Pt will get antibacterial soap

## 2023-02-14 ENCOUNTER — HOSPITAL ENCOUNTER (OUTPATIENT)
Facility: HOSPITAL | Age: 63
Setting detail: OUTPATIENT SURGERY
Discharge: HOME | End: 2023-02-16
Attending: UROLOGY | Admitting: UROLOGY

## 2023-02-14 ENCOUNTER — ANESTHESIA EVENT (OUTPATIENT)
Dept: PERIOP | Facility: HOSPITAL | Age: 63
End: 2023-02-14

## 2023-02-14 ENCOUNTER — ANESTHESIA (OUTPATIENT)
Dept: PERIOP | Facility: HOSPITAL | Age: 63
End: 2023-02-14

## 2023-02-14 DIAGNOSIS — R33.8 URINARY RETENTION DUE TO BENIGN PROSTATIC HYPERPLASIA: ICD-10-CM

## 2023-02-14 DIAGNOSIS — N32.89 BLADDER SPASMS: Primary | ICD-10-CM

## 2023-02-14 DIAGNOSIS — Z97.8 FOLEY CATHETER IN PLACE: ICD-10-CM

## 2023-02-14 DIAGNOSIS — N40.1 URINARY RETENTION DUE TO BENIGN PROSTATIC HYPERPLASIA: ICD-10-CM

## 2023-02-14 LAB
ABO GROUP BLD: NORMAL
ANION GAP SERPL CALCULATED.3IONS-SCNC: 6 MMOL/L (ref 4–13)
ATRIAL RATE: 60 BPM
BUN SERPL-MCNC: 17 MG/DL (ref 5–25)
CALCIUM SERPL-MCNC: 7.9 MG/DL (ref 8.3–10.1)
CHLORIDE SERPL-SCNC: 110 MMOL/L (ref 96–108)
CO2 SERPL-SCNC: 21 MMOL/L (ref 21–32)
CREAT SERPL-MCNC: 1.13 MG/DL (ref 0.6–1.3)
GFR SERPL CREATININE-BSD FRML MDRD: 69 ML/MIN/1.73SQ M
GLUCOSE P FAST SERPL-MCNC: 136 MG/DL (ref 65–99)
GLUCOSE SERPL-MCNC: 136 MG/DL (ref 65–140)
HCT VFR BLD AUTO: 39.2 % (ref 36.5–49.3)
HGB BLD-MCNC: 12.8 G/DL (ref 12–17)
P AXIS: 72 DEGREES
POTASSIUM SERPL-SCNC: 4.3 MMOL/L (ref 3.5–5.3)
PR INTERVAL: 258 MS
QRS AXIS: -48 DEGREES
QRSD INTERVAL: 116 MS
QT INTERVAL: 426 MS
QTC INTERVAL: 426 MS
RH BLD: POSITIVE
SODIUM SERPL-SCNC: 137 MMOL/L (ref 135–147)
T WAVE AXIS: 43 DEGREES
VENTRICULAR RATE: 60 BPM

## 2023-02-14 RX ORDER — ACETAMINOPHEN 325 MG/1
975 TABLET ORAL ONCE
Status: COMPLETED | OUTPATIENT
Start: 2023-02-14 | End: 2023-02-14

## 2023-02-14 RX ORDER — FINASTERIDE 5 MG/1
5 TABLET, FILM COATED ORAL ONCE
Status: COMPLETED | OUTPATIENT
Start: 2023-02-14 | End: 2023-02-14

## 2023-02-14 RX ORDER — SODIUM CHLORIDE, SODIUM LACTATE, POTASSIUM CHLORIDE, CALCIUM CHLORIDE 600; 310; 30; 20 MG/100ML; MG/100ML; MG/100ML; MG/100ML
125 INJECTION, SOLUTION INTRAVENOUS CONTINUOUS
Status: DISCONTINUED | OUTPATIENT
Start: 2023-02-14 | End: 2023-02-15 | Stop reason: ALTCHOICE

## 2023-02-14 RX ORDER — MAGNESIUM HYDROXIDE 1200 MG/15ML
LIQUID ORAL AS NEEDED
Status: DISCONTINUED | OUTPATIENT
Start: 2023-02-14 | End: 2023-02-14 | Stop reason: HOSPADM

## 2023-02-14 RX ORDER — ACETAMINOPHEN 325 MG/1
650 TABLET ORAL EVERY 6 HOURS SCHEDULED
Status: DISCONTINUED | OUTPATIENT
Start: 2023-02-14 | End: 2023-02-15

## 2023-02-14 RX ORDER — EPHEDRINE SULFATE 50 MG/ML
INJECTION INTRAVENOUS AS NEEDED
Status: DISCONTINUED | OUTPATIENT
Start: 2023-02-14 | End: 2023-02-14

## 2023-02-14 RX ORDER — HYDROMORPHONE HCL IN WATER/PF 6 MG/30 ML
0.2 PATIENT CONTROLLED ANALGESIA SYRINGE INTRAVENOUS
Status: DISCONTINUED | OUTPATIENT
Start: 2023-02-14 | End: 2023-02-14 | Stop reason: HOSPADM

## 2023-02-14 RX ORDER — TRANEXAMIC ACID 100 MG/ML
INJECTION, SOLUTION INTRAVENOUS AS NEEDED
Status: DISCONTINUED | OUTPATIENT
Start: 2023-02-14 | End: 2023-02-14

## 2023-02-14 RX ORDER — FENTANYL CITRATE/PF 50 MCG/ML
50 SYRINGE (ML) INJECTION
Status: DISCONTINUED | OUTPATIENT
Start: 2023-02-14 | End: 2023-02-14 | Stop reason: HOSPADM

## 2023-02-14 RX ORDER — ONDANSETRON 2 MG/ML
INJECTION INTRAMUSCULAR; INTRAVENOUS AS NEEDED
Status: DISCONTINUED | OUTPATIENT
Start: 2023-02-14 | End: 2023-02-14

## 2023-02-14 RX ORDER — ONDANSETRON 2 MG/ML
4 INJECTION INTRAMUSCULAR; INTRAVENOUS ONCE AS NEEDED
Status: DISCONTINUED | OUTPATIENT
Start: 2023-02-14 | End: 2023-02-14 | Stop reason: HOSPADM

## 2023-02-14 RX ORDER — ATROPA BELLADONNA AND OPIUM 16.2; 3 MG/1; MG/1
1 SUPPOSITORY RECTAL EVERY 6 HOURS PRN
Status: DISCONTINUED | OUTPATIENT
Start: 2023-02-14 | End: 2023-02-14

## 2023-02-14 RX ORDER — MIDAZOLAM HYDROCHLORIDE 2 MG/2ML
INJECTION, SOLUTION INTRAMUSCULAR; INTRAVENOUS AS NEEDED
Status: DISCONTINUED | OUTPATIENT
Start: 2023-02-14 | End: 2023-02-14

## 2023-02-14 RX ORDER — OXYBUTYNIN CHLORIDE 5 MG/1
5 TABLET ORAL 2 TIMES DAILY
Status: DISCONTINUED | OUTPATIENT
Start: 2023-02-14 | End: 2023-02-16 | Stop reason: HOSPADM

## 2023-02-14 RX ORDER — LIDOCAINE HYDROCHLORIDE 10 MG/ML
INJECTION, SOLUTION EPIDURAL; INFILTRATION; INTRACAUDAL; PERINEURAL AS NEEDED
Status: DISCONTINUED | OUTPATIENT
Start: 2023-02-14 | End: 2023-02-14

## 2023-02-14 RX ORDER — NICOTINE 21 MG/24HR
14 PATCH, TRANSDERMAL 24 HOURS TRANSDERMAL DAILY
Status: DISCONTINUED | OUTPATIENT
Start: 2023-02-14 | End: 2023-02-16 | Stop reason: HOSPADM

## 2023-02-14 RX ORDER — FENTANYL CITRATE 50 UG/ML
INJECTION, SOLUTION INTRAMUSCULAR; INTRAVENOUS AS NEEDED
Status: DISCONTINUED | OUTPATIENT
Start: 2023-02-14 | End: 2023-02-14

## 2023-02-14 RX ORDER — DOCUSATE SODIUM 100 MG/1
100 CAPSULE, LIQUID FILLED ORAL 2 TIMES DAILY
Status: DISCONTINUED | OUTPATIENT
Start: 2023-02-14 | End: 2023-02-16 | Stop reason: HOSPADM

## 2023-02-14 RX ORDER — FUROSEMIDE 10 MG/ML
INJECTION INTRAMUSCULAR; INTRAVENOUS AS NEEDED
Status: DISCONTINUED | OUTPATIENT
Start: 2023-02-14 | End: 2023-02-14

## 2023-02-14 RX ORDER — SODIUM CHLORIDE, SODIUM LACTATE, POTASSIUM CHLORIDE, CALCIUM CHLORIDE 600; 310; 30; 20 MG/100ML; MG/100ML; MG/100ML; MG/100ML
INJECTION, SOLUTION INTRAVENOUS CONTINUOUS PRN
Status: DISCONTINUED | OUTPATIENT
Start: 2023-02-14 | End: 2023-02-14

## 2023-02-14 RX ORDER — DEXAMETHASONE SODIUM PHOSPHATE 10 MG/ML
INJECTION, SOLUTION INTRAMUSCULAR; INTRAVENOUS AS NEEDED
Status: DISCONTINUED | OUTPATIENT
Start: 2023-02-14 | End: 2023-02-14

## 2023-02-14 RX ORDER — ROCURONIUM BROMIDE 10 MG/ML
INJECTION, SOLUTION INTRAVENOUS AS NEEDED
Status: DISCONTINUED | OUTPATIENT
Start: 2023-02-14 | End: 2023-02-14

## 2023-02-14 RX ORDER — FINASTERIDE 5 MG/1
5 TABLET, FILM COATED ORAL DAILY
Status: DISCONTINUED | OUTPATIENT
Start: 2023-02-15 | End: 2023-02-16 | Stop reason: HOSPADM

## 2023-02-14 RX ORDER — BACITRACIN, NEOMYCIN, POLYMYXIN B 400; 3.5; 5 [USP'U]/G; MG/G; [USP'U]/G
1 OINTMENT TOPICAL 2 TIMES DAILY
Status: DISCONTINUED | OUTPATIENT
Start: 2023-02-14 | End: 2023-02-16 | Stop reason: HOSPADM

## 2023-02-14 RX ORDER — SENNOSIDES 8.6 MG
1 TABLET ORAL DAILY
Status: DISCONTINUED | OUTPATIENT
Start: 2023-02-14 | End: 2023-02-16 | Stop reason: HOSPADM

## 2023-02-14 RX ORDER — HYDROMORPHONE HCL 110MG/55ML
PATIENT CONTROLLED ANALGESIA SYRINGE INTRAVENOUS AS NEEDED
Status: DISCONTINUED | OUTPATIENT
Start: 2023-02-14 | End: 2023-02-14

## 2023-02-14 RX ORDER — MAGNESIUM HYDROXIDE 1200 MG/15ML
3000 LIQUID ORAL CONTINUOUS
Status: DISCONTINUED | OUTPATIENT
Start: 2023-02-14 | End: 2023-02-15 | Stop reason: ALTCHOICE

## 2023-02-14 RX ORDER — PROPOFOL 10 MG/ML
INJECTION, EMULSION INTRAVENOUS AS NEEDED
Status: DISCONTINUED | OUTPATIENT
Start: 2023-02-14 | End: 2023-02-14

## 2023-02-14 RX ORDER — ONDANSETRON 2 MG/ML
4 INJECTION INTRAMUSCULAR; INTRAVENOUS EVERY 6 HOURS PRN
Status: DISCONTINUED | OUTPATIENT
Start: 2023-02-14 | End: 2023-02-16 | Stop reason: HOSPADM

## 2023-02-14 RX ADMIN — ACETAMINOPHEN 650 MG: 325 TABLET, FILM COATED ORAL at 23:57

## 2023-02-14 RX ADMIN — OXYBUTYNIN CHLORIDE 5 MG: 5 TABLET ORAL at 19:12

## 2023-02-14 RX ADMIN — ONDANSETRON 4 MG: 2 INJECTION INTRAMUSCULAR; INTRAVENOUS at 12:24

## 2023-02-14 RX ADMIN — ROCURONIUM BROMIDE 20 MG: 10 INJECTION INTRAVENOUS at 08:54

## 2023-02-14 RX ADMIN — DOCUSATE SODIUM 100 MG: 100 CAPSULE, LIQUID FILLED ORAL at 19:12

## 2023-02-14 RX ADMIN — SENNOSIDES 8.6 MG: 8.6 TABLET, FILM COATED ORAL at 19:12

## 2023-02-14 RX ADMIN — PROPOFOL 200 MG: 10 INJECTION, EMULSION INTRAVENOUS at 08:08

## 2023-02-14 RX ADMIN — NICOTINE 14 MG: 14 PATCH, EXTENDED RELEASE TRANSDERMAL at 16:24

## 2023-02-14 RX ADMIN — GENTAMICIN SULFATE 184 MG: 40 INJECTION, SOLUTION INTRAMUSCULAR; INTRAVENOUS at 20:10

## 2023-02-14 RX ADMIN — ROCURONIUM BROMIDE 20 MG: 10 INJECTION INTRAVENOUS at 10:12

## 2023-02-14 RX ADMIN — MIDAZOLAM 2 MG: 1 INJECTION INTRAMUSCULAR; INTRAVENOUS at 08:03

## 2023-02-14 RX ADMIN — DEXAMETHASONE SODIUM PHOSPHATE 10 MG: 10 INJECTION INTRAMUSCULAR; INTRAVENOUS at 09:18

## 2023-02-14 RX ADMIN — HYDROMORPHONE HYDROCHLORIDE 0.5 MG: 2 INJECTION, SOLUTION INTRAMUSCULAR; INTRAVENOUS; SUBCUTANEOUS at 09:01

## 2023-02-14 RX ADMIN — ROCURONIUM BROMIDE 60 MG: 10 INJECTION INTRAVENOUS at 08:10

## 2023-02-14 RX ADMIN — ROCURONIUM BROMIDE 20 MG: 10 INJECTION INTRAVENOUS at 09:36

## 2023-02-14 RX ADMIN — FENTANYL CITRATE 50 MCG: 50 INJECTION INTRAMUSCULAR; INTRAVENOUS at 08:13

## 2023-02-14 RX ADMIN — FENTANYL CITRATE 50 MCG: 50 INJECTION INTRAMUSCULAR; INTRAVENOUS at 08:08

## 2023-02-14 RX ADMIN — FENTANYL CITRATE 50 MCG: 50 INJECTION INTRAMUSCULAR; INTRAVENOUS at 13:06

## 2023-02-14 RX ADMIN — ACETAMINOPHEN 650 MG: 325 TABLET, FILM COATED ORAL at 19:12

## 2023-02-14 RX ADMIN — FENTANYL CITRATE 50 MCG: 50 INJECTION INTRAMUSCULAR; INTRAVENOUS at 11:58

## 2023-02-14 RX ADMIN — PHENYLEPHRINE HYDROCHLORIDE 30 MCG/MIN: 10 INJECTION INTRAVENOUS at 08:27

## 2023-02-14 RX ADMIN — ROCURONIUM BROMIDE 15 MG: 10 INJECTION INTRAVENOUS at 11:57

## 2023-02-14 RX ADMIN — ROCURONIUM BROMIDE 15 MG: 10 INJECTION INTRAVENOUS at 11:15

## 2023-02-14 RX ADMIN — METHYLENE BLUE 10 ML: 5 INJECTION INTRAVENOUS at 11:21

## 2023-02-14 RX ADMIN — CEFTRIAXONE SODIUM 2000 MG: 10 INJECTION, POWDER, FOR SOLUTION INTRAVENOUS at 08:16

## 2023-02-14 RX ADMIN — FINASTERIDE 5 MG: 5 TABLET, FILM COATED ORAL at 16:24

## 2023-02-14 RX ADMIN — FUROSEMIDE 20 MG: 10 INJECTION, SOLUTION INTRAMUSCULAR; INTRAVENOUS at 11:24

## 2023-02-14 RX ADMIN — FLUORESCEIN SODIUM 100 MG: 100 INJECTION INTRAVENOUS at 11:47

## 2023-02-14 RX ADMIN — ACETAMINOPHEN 975 MG: 325 TABLET, FILM COATED ORAL at 07:14

## 2023-02-14 RX ADMIN — GENTAMICIN SULFATE 184 MG: 40 INJECTION, SOLUTION INTRAMUSCULAR; INTRAVENOUS at 09:16

## 2023-02-14 RX ADMIN — SODIUM CHLORIDE, SODIUM LACTATE, POTASSIUM CHLORIDE, AND CALCIUM CHLORIDE: .6; .31; .03; .02 INJECTION, SOLUTION INTRAVENOUS at 08:03

## 2023-02-14 RX ADMIN — EPHEDRINE SULFATE 10 MG: 50 INJECTION INTRAVENOUS at 08:23

## 2023-02-14 RX ADMIN — TRANEXAMIC ACID 1 G: 100 INJECTION INTRAVENOUS at 08:35

## 2023-02-14 RX ADMIN — SUGAMMADEX 245 MG: 100 INJECTION, SOLUTION INTRAVENOUS at 12:58

## 2023-02-14 RX ADMIN — LIDOCAINE HYDROCHLORIDE 50 MG: 10 INJECTION, SOLUTION EPIDURAL; INFILTRATION; INTRACAUDAL; PERINEURAL at 08:08

## 2023-02-14 NOTE — OP NOTE
OPERATIVE REPORT  PATIENT NAME: Fadumo Quinonez    :  1960  MRN: [id number]  Pt Location: [facility]    SURGERY DATE: 2023    Surgeon(s) and Role:     * Zakiya Sexton MD - Primary    Preop Diagnosis:  Urinary retention due to benign prostatic hyperplasia [N40 1, R33 8]    Post-Op Diagnosis Codes:     * Urinary retention due to benign prostatic hyperplasia [N40 1, R33 8]    Procedure(s):  LASER ENUCLEATION PROSTATE W MORCELLATION  TRANSURETHRAL RESECTION OF PROSTATE (TURP)    Specimen(s):  ID Type Source Tests Collected by Time Destination   1 : Prostate chips Tissue Prostate TISSUE EXAM Zakiya Sexton MD 2023 1225        Estimated Blood Loss:   150 mL    Drains:  Urethral Catheter Latex; Three way 24 Fr  (Active)   Number of days: 0       Anesthesia Type:   General    Operative Indications:  61-year-old male with catheter dependent retention for 7 months  He had elevated PSA with work-up and MRI which had a PI-RADS 4 lesion leading to biopsy which unfortunately developed sepsis from  However pathology results are negative  He therefore presents for outlet surgery  Operative Findings:  Ureteral orifices identified before enucleation and were close to the prostate  The right side could be seen after enucleation close to resection border but the left side was not able to be identified despite use of methenamine blue and fluorescein  Enucleation performed, was difficult to find the true planes  Residual anterior tissue resected with bipolar loop  Strong expression stream seen when scope removed  Good hemostasis at end of surgery  242Kj laser energy used    Complications:   Could not identify left ureteral orifice at end of surgery raising concern for possible injury    Procedure and Technique:   With the patient probably identified and informed consent obtained including the risks of bleeding, infection, urethral stricture, urinary incontinence, massive fluid absorption, retrograde ejaculation, bladder injury including perforation, and need for secondary procedures, patient was placed supine in the operating room theater  General anesthesia was administered  He was placed in a dorsal lithotomy position and sterilely prepped and draped in the usual fashion  A 24 [place] cystoscope with 30 degree lens was introduced into the urethra and bladder  The prostate showed bilobar hypertrophy without an obvious median lobe  There were no concerning lesions within the bladder  There were moderate trabeculations and multiple diverticula seen  The ureteral orifices were identified close to the prostate  The visual  was exchanged for a laser bridge element  Using a 550 micron fiber and laser settings of 2 0J and 40Hz with virtual basketing for enculeation and 1 5J and 30Hz for coagulation ablation was initiated at the right sulcus at the 5:00 and 7:00 position working toward the veramontanum  A "U" shaped incision was then made around the verumontanum beginning to undermine the median and posterior medial lateral lobes  The median lobe was then dissected out with laser energy and eventually passed into the bladder  Thereafter attention was turned to the right lobe of prostate which was enucleated by first working posterior along the floor of the prostate towards the lateral wall  Then a new incision was made at the 12:00 o'clock location and the plane was extended towards the patient's left anterior side  Any localized bleeders were cauterized  The 2 dissection planes were made to me to each other on the lateral aspect of the wall  Care was taken at the apex of the prostate to minimize energy during enucleation     Eventually with additional circumferential enucleation the left lateral lobe was flipped into the bladder and enucleation was completed by cutting small residual tissue at the bladder neck with care taken to avoid injury to the nearby ureteral orifice  Attention was was then turned to the contralateral (left ) side  In a similar fashion the enucleation was started with creation of a plane at the posterior aspect of the prostate working towards the apex and laterally  We then moved to the anterior aspect and worked above and around the prostate anteriorly  As the enucleation progressed we were able to flip the lobe into the bladder and finish enucleation along the bladder neck freeing the lobe  There was a small area of capsular perforation per appreciated on the left lower lateral wall  Attention was now turned towards the prostatic fossa  There was fairly significant adenoma anterior  Attention was also turned to localized bleeders which were treated with treated with laser  Attention was now turned towards morcellation  An offset nephroscope was introduced into the outer sheath with 2 inflows hooked up  The morcellation probe was introduced through the nephroscope and the adenoma was engaged and morcellated  After morcellation was complete the bladder and prostatic fossa were re-evaluated to ensure there was no residual floating adenoma pieces  Bladder was reinspected and  the ureteral orifices  was unharmed but close to the resection border  The left ureteral orifice was not able to be identified  Methenamine blue and then fluorescein were given IV  While these had a chance to set in a bipolar loop was used to resect residual adenoma at the anterior aspect of the prostate  Ellik was used to evacuate pieces  Careful hemostasis was then obtained  Bladder was reevaluated  The left ureteral orifice was still not clearly identified despite careful search  This raised concern for possible injury to the orifice from enucleation    Consideration was made for resection of the trigone but thought it safer to observe with postop ultrasound and then make determination for possible antegrade stent placement         A wire was placed through the resectoscope which was then withdrawn  25 Polish three-way CBI catheter was placed over the wire and irrigated freely with clear CBI  Patient awakened from anesthesia having tolerated the procedure well  A qualified resident physician was not available    Patient Disposition:  PACU     PLAN: Renal Bladder US this evening or tomorrow morning  If hydronephrosis is present plan for stent placement antegrade by interventional radiology with dwell time of 4 weeks  Plan for cox catheter x 1 week      SIGNATURE: Marsh Nyhan, MD  DATE: February 14, 2023  TIME: 1:09 PM

## 2023-02-14 NOTE — DISCHARGE INSTR - AVS FIRST PAGE
Mr Joanne aGma,    Your HoLEP surgery was difficult but overall went well  We removed a large volume of prostate tissue to create a more open channel which should hopefully help you void  We will plan to remove your catheter next week in the Urology [facility]  We will send you home with an antibiotic given history of bacteria growing in the urine  Some bloody urine is quite normal for up to 2 weeks after surgery  If the urine his bloody but clear at this is not concerning  However if it is bloody and thick like ketchup this is concerning and you should let us know  I would like you to get a repeat renal US in 1 month  My team will help you schedule this  Feelings of having to urinate more often with urgency and having bladder spasms is very common after this kind surgery as it is your bladder's way of reacting to the surgery  If you are having difficulty voiding please let us know because sometimes there can be difficulty voiding from surgery  Please call us if you have any questions or concerns, [phone]  Shady Coffey MD  [facility]rology      HoLEP Prostatectomy   AMBULATORY CARE:   What you need to know about a HoLEP (Holmium Laser Enculeation of the Prostate)  This is a surgery to remove part or all of your prostate gland  What to expect after:  A Daniels catheter is inserted to drain urine into a bag  Your healthcare provider will tell you how to clean around the catheter if you go home with one  You will need to clean the area 2 times a day to prevent infection  You may have feelings of urgency and difficulty controlling your urine  You may have pain when you urinate and also a small amount of blood in your urine  You may also have a problem getting an erection and keeping one  Risks of a HoLEP:   Your prostate, bladder, or urethra may be damaged  Your urethra or part of your bladder may grow narrow   This can make it difficult or painful to urinate  You may feel like you need to urinate often, or have trouble controlling when you urinate  You may get blood clots in your urine that can block your urethra  You may develop a urinary tract infection, or an infection in the surgery area  You may have trouble getting an erection or ejaculating  You may develop TURP syndrome, which can cause dizziness, fatigue, stomach pain, and vomiting  If you had a partial resection of the prostate, the part of your prostate that was not removed may grow too large  This can cause your signs and symptoms to return, and you may need to have surgery again  Seek care immediately if:   You have severe abdominal or back pain  You are dizzy or confused  You have abdominal pain, nausea, and vomiting  Your heartbeat is slower than usual     Call your doctor or urologist if:   You urinate little or not at all  You have a fever  You have new or more blood in your urine  You have trouble starting to urinate, or have a weak stream of urine when you urinate  You feel like you have a full bladder, even after you urinate  You often wake up during the night to urinate  You feel pain or pressure in your lower abdomen  Your urine looks cloudy, and smells bad  You have questions or concerns about your condition or care  Medicines: You may need any of the following:  Prescription pain medicine  may be given  Ask your healthcare provider how to take this medicine safely  Some prescription pain medicines contain acetaminophen  Do not take other medicines that contain acetaminophen without talking to your healthcare provider  Too much acetaminophen may cause liver damage  Prescription pain medicine may cause constipation  Ask your healthcare provider how to prevent or treat constipation  Antibiotics  prevent or fight an infection caused by bacteria  Take your medicine as directed    Contact your healthcare provider if you think your medicine is not helping or if you have side effects  Tell him or her if you are allergic to any medicine  Keep a list of the medicines, vitamins, and herbs you take  Include the amounts, and when and why you take them  Bring the list or the pill bottles to follow-up visits  Carry your medicine list with you in case of an emergency  Daniels catheter care:  Keep the bag below your waist  If the bag is too high, urine will flow back into your bladder  This can cause an infection  Do not pull on the catheter  This may cause pain and bleeding, and the catheter may come out  Do not let the catheter tubing kink or twist  A kink or twist will block the flow of urine  Bladder control:  After surgery, you may leak urine and have trouble controlling when you urinate  The following can help decrease or manage urine leakage:  Drink fluids as directed  Fluids may help your kidneys and bladder work properly  Fluids can also decrease your chance for infections  Ask your healthcare provider which fluids are best for you and how much you should drink  Do not have caffeine  Caffeine can cause problems with bladder control and increase your need to urinate  Wear a pad or adult diapers, if needed  These may help to absorb leaking urine and decrease odor  Do pelvic floor muscle exercises  Pelvic floor muscle exercises, also called Kegels, may help improve your bladder control  These exercises are done by tightening and relaxing your pelvic muscles  Ask how to do pelvic floor muscle exercises, and how often to do them  Activity:  Ask when it is okay for you to return to work and activities, or to have sex  Follow up with your surgeon or urologist as directed: You may need to return to make sure you do not have an infection, or to have your Daniels catheter removed  Write down your questions so you remember to ask them during your visits    © Copyright [employer] 2021 Information is for End User's use only and may not be sold, redistributed or otherwise used for commercial purposes  All illustrations and images included in CareNotes® are the copyrighted property of A D A M , Inc  or [employer]  The above information is an  only  It is not intended as medical advice for individual conditions or treatments  Talk to your doctor, nurse or pharmacist before following any medical regimen to see if it is safe and effective for you

## 2023-02-14 NOTE — ANESTHESIA PREPROCEDURE EVALUATION
Procedure:  LASER ENUCLEATION PROSTATE W MORCELLATION (Abdomen)    Relevant Problems   CARDIO   (+) Hyperlipidemia      PULMONARY   (+) Sleep apnea   Neck ductal cyst, caused major neck thickening requiring neck surgery and thyroid surgery at age 48; post op, uvula expanded and required second surgery to remove uvula  Difficult intubation in past, before prior neck surgery    Cards clearance reviewed       Recent labs personally reviewed:  Lab Results   Component Value Date    WBC 6 62 01/30/2023    HGB 13 9 01/30/2023     01/30/2023     Lab Results   Component Value Date    K 4 5 01/30/2023    BUN 20 01/30/2023    CREATININE 1 01 01/30/2023     Lab Results   Component Value Date    PTT 29 11/01/2022      Lab Results   Component Value Date    INR 1 12 11/01/2022       No results found for: HGBA1C    Type and Screen:  O        Physical Exam    Airway    Mallampati score: III  TM Distance: <3 FB  Neck ROM: full     Dental   No notable dental hx     Cardiovascular      Pulmonary  Pulmonary exam normal     Other Findings        Anesthesia Plan  ASA Score- 2     Anesthesia Type- general with ASA Monitors  Additional Monitors:   Airway Plan: ETT  Plan Factors-Exercise tolerance (METS): >4 METS  Chart reviewed  EKG reviewed  Existing labs reviewed  Patient summary reviewed  Patient is a current smoker  Induction- intravenous  Postoperative Plan- Plan for postoperative opioid use  Planned trial extubation    Informed Consent- Anesthetic plan and risks discussed with patient  I personally reviewed this patient with the CRNA  Discussed and agreed on the Anesthesia Plan with the CRNA  Wendy Aguero

## 2023-02-14 NOTE — PERIOPERATIVE NURSING NOTE
Received report from [facility]  The patient was sleepy, but easily arousable post operatively  Nasal O2 in use secondary to decreased O2 sats when sleeping  The patient denies nausea, complained of right shoulder pain  Relayed to physician by patient  Tolerated ice chips and ice water  CBI continues with clear yellow return  Received nicotine patch order per patient request   All needs met  Awaiting available room on MS 7

## 2023-02-14 NOTE — ANESTHESIA POSTPROCEDURE EVALUATION
Post-Op Assessment Note    CV Status:  Stable  Pain Score: 0    Pain management: adequate     Mental Status:  Awake and agitated   Hydration Status:  Euvolemic   PONV Controlled:  Controlled   Airway Patency:  Patent      Post Op Vitals Reviewed: Yes      Staff: Anesthesiologist         No notable events documented      BP   118/63   Temp 97 7 °F (36 5 °C) (02/14/23 1310)    Pulse  76   Resp   16   SpO2   93 on nrb (intermittently obstructing/severe bambi)

## 2023-02-14 NOTE — H&P
UROLOGY HISTORY AND PHYSICAL     Patient Identifiers: Melly Bear (MRN [id number])      Date of Service: 2/14/2023        ASSESSMENT:     58 y o  old male with  chronic retention sp workup for elpatricia dPSA   PLAN:     We had plan for HoLEP surgery but canceled because of MRI showing a lesion which has now been biopsied and appears negative  Therefore we will move forward again with HoLEP surgery  We have previously discussed robotic surgery as well as prostate artery embolization but given his history of surgical mesh and his prostate size and retention I think HoLEP is the best option  We discussed risks and rediscussed him today:      History of Present Illness:     Melly Bear is a 58 y o  old with a history of gross hematuria and recurrent catheter dependent urinary retention        The patient has been catheter dependent for approximately 2 months   He has also had issues in the past usually precipitated by alcohol   He recently had a trial of void which he again failed  He hates his catheter   He had cysto showing bilobar hypertrophy (no median lobe) and TRUS volume of 108cc   He was recently started on finasteride (already on Flomax)   Reports he has had more issues with bladder spasms and pericatheter leakage since his last catheter was placed   As result he says he has cut down on drinking      Regarding his PSA he does have a history of negative prostate biopsy in the past but we do not have access to the records   His last PSA was 4 5 in 2020, repeat was up to 5  6   we elected to obtain an MRI which showed 106 cc gland with PI-RADS 4 lesion measuring 3 cm on the right side   His father had prostate cancer  Based on PSA and MRI findings elected for MRI guided fusion biopsy which was fortunately negative but unfortunately he developed post biopsy suspected urosepsis(although blood and urine cultures were negative) requiring hospitalization      He is overall doing better now    His prior pain with catheter has improved      Also with angiokertoma of scrotum      He is not on blood thinners does take a lot of NSAIDs because of discomfort with catheterization     Abdominal surgical history is significant for appendectomy and umbilical mesh for hernia      Pt is here with his sister  Past Medical, Past Surgical History:     Past Medical History:   Diagnosis Date   • Enlarged prostate    • Daniels catheter in place    • Hyperlipidemia    • Sleep apnea     does not have a cpap machine   • Tinnitus    • Unable to empty bladder    • Wears reading eyeglasses    :    Past Surgical History:   Procedure Laterality Date   • APPENDECTOMY     • CLAVICLE FRACTURE REPAIR     • CYST REMOVAL     • HERNIA REPAIR     • DE BX PROSTATE STRTCTC SATURATION SAMPLING IMG GID N/A 2022    Procedure: TRANSPERINEALMRI FUSION BIOPSY PROSTATE;  Surgeon: Seth Ordaz MD;  Location: [facility];  Service: Urology   • TONSILLECTOMY     • UVULECTOMY     :    Medications, Allergies:     Current Facility-Administered Medications:   •  cefTRIAXone (ROCEPHIN) 1,000 mg in dextrose 5 % 50 mL IVPB, 1,000 mg, Intravenous, Q24H, Enio Rubi MD    Allergies: Allergies   Allergen Reactions   • Other Anxiety     Ether   high anxiety   • Keflex [Cephalexin] Other (See Comments)     Unsure - wasn't effective many years ago   :    Social and Family History:   Social History:   Social History     Tobacco Use   • Smoking status: Former     Types: Cigarettes     Quit date:      Years since quittin    • Smokeless tobacco: Never   Vaping Use   • Vaping Use: Every day   • Substances: Nicotine, Flavoring   Substance Use Topics   • Alcohol use: Yes     Comment: rarely   • Drug use: Yes     Types: Marijuana     Comment: rarely - once every 5-6 months         Social History     Tobacco Use   Smoking Status Former   • Types: Cigarettes   • Quit date:    • Years since quittin 1   Smokeless Tobacco Never       Family History:  Family History   Problem Relation Age of Onset   • Dementia Mother    • Prostate cancer Father    :     Review of Systems:     General: Fever, chills, or night sweats: negative  Cardiac: Negative for chest pain  Pulmonary: Negative for shortness of breath  Gastrointestinal: Abdominal pain negative  Nausea, vomiting, or diarrhea negative  Genitourinary: See HPI above  Patient does nothave hematuria  All other systems queried were negative  Physical Exam:   General: Patient is pleasant and in NAD  Awake and alert  /85   Pulse 67   Temp 97 5 °F (36 4 °C) (Temporal)   Resp 16   Ht 5' 9" (1 753 m)   Wt 122 kg (270 lb)   SpO2 92%   BMI 39 87 kg/m²   HEENT:  Normocephalic atraumatic  Cardiac:  Regular rate and rhythm, Peripheral edema: negative  Pulmonary: Non-labored breathing, CTAB  Abdomen: Soft, non-tender, non-distended  No surgical scars  No masses, tenderness, hernias noted  Genitourinary: negative CVA tenderness, neg suprapubic tenderness  Extremities: normal movement in all 4       Labs:     Lab Results   Component Value Date    HGB 13 9 01/30/2023    HCT 43 8 01/30/2023    WBC 6 62 01/30/2023     01/30/2023   ]    Lab Results   Component Value Date    K 4 5 01/30/2023     01/30/2023    CO2 24 01/30/2023    BUN 20 01/30/2023    CREATININE 1 01 01/30/2023    CALCIUM 9 7 01/30/2023   ]    Imaging:   I personally reviewed the images and report of the following studies, and reviewed them with the patient:    MRI Oct 2022 106cc gland    Thank you for allowing me to participate in this patients’ care  Please do not hesitate to call with any additional questions    Yi Otoole MD

## 2023-02-15 ENCOUNTER — APPOINTMENT (OUTPATIENT)
Dept: RADIOLOGY | Facility: HOSPITAL | Age: 63
End: 2023-02-15

## 2023-02-15 LAB
ANION GAP SERPL CALCULATED.3IONS-SCNC: 6 MMOL/L (ref 4–13)
BUN SERPL-MCNC: 20 MG/DL (ref 5–25)
CALCIUM SERPL-MCNC: 8.3 MG/DL (ref 8.3–10.1)
CHLORIDE SERPL-SCNC: 107 MMOL/L (ref 96–108)
CO2 SERPL-SCNC: 25 MMOL/L (ref 21–32)
CREAT SERPL-MCNC: 0.9 MG/DL (ref 0.6–1.3)
ERYTHROCYTE [DISTWIDTH] IN BLOOD BY AUTOMATED COUNT: 13.9 % (ref 11.6–15.1)
GFR SERPL CREATININE-BSD FRML MDRD: 91 ML/MIN/1.73SQ M
GLUCOSE SERPL-MCNC: 110 MG/DL (ref 65–140)
HCT VFR BLD AUTO: 34.2 % (ref 36.5–49.3)
HGB BLD-MCNC: 10.9 G/DL (ref 12–17)
MCH RBC QN AUTO: 28.1 PG (ref 26.8–34.3)
MCHC RBC AUTO-ENTMCNC: 31.9 G/DL (ref 31.4–37.4)
MCV RBC AUTO: 88 FL (ref 82–98)
PLATELET # BLD AUTO: 183 THOUSANDS/UL (ref 149–390)
PMV BLD AUTO: 9.6 FL (ref 8.9–12.7)
POTASSIUM SERPL-SCNC: 4.2 MMOL/L (ref 3.5–5.3)
RBC # BLD AUTO: 3.88 MILLION/UL (ref 3.88–5.62)
SODIUM SERPL-SCNC: 138 MMOL/L (ref 135–147)
WBC # BLD AUTO: 9.86 THOUSAND/UL (ref 4.31–10.16)

## 2023-02-15 RX ORDER — HYDROCODONE BITARTRATE AND ACETAMINOPHEN 5; 325 MG/1; MG/1
1 TABLET ORAL EVERY 6 HOURS PRN
Status: DISCONTINUED | OUTPATIENT
Start: 2023-02-15 | End: 2023-02-16 | Stop reason: HOSPADM

## 2023-02-15 RX ORDER — LANOLIN ALCOHOL/MO/W.PET/CERES
3 CREAM (GRAM) TOPICAL
Status: DISCONTINUED | OUTPATIENT
Start: 2023-02-15 | End: 2023-02-16 | Stop reason: HOSPADM

## 2023-02-15 RX ORDER — ACETAMINOPHEN 325 MG/1
650 TABLET ORAL EVERY 6 HOURS PRN
Status: DISCONTINUED | OUTPATIENT
Start: 2023-02-15 | End: 2023-02-16 | Stop reason: HOSPADM

## 2023-02-15 RX ADMIN — OXYBUTYNIN CHLORIDE 5 MG: 5 TABLET ORAL at 08:48

## 2023-02-15 RX ADMIN — SODIUM CHLORIDE, SODIUM LACTATE, POTASSIUM CHLORIDE, AND CALCIUM CHLORIDE 125 ML/HR: .6; .31; .03; .02 INJECTION, SOLUTION INTRAVENOUS at 07:08

## 2023-02-15 RX ADMIN — DOCUSATE SODIUM 100 MG: 100 CAPSULE, LIQUID FILLED ORAL at 08:48

## 2023-02-15 RX ADMIN — NICOTINE 14 MG: 14 PATCH, EXTENDED RELEASE TRANSDERMAL at 08:44

## 2023-02-15 RX ADMIN — GENTAMICIN SULFATE 184 MG: 40 INJECTION, SOLUTION INTRAMUSCULAR; INTRAVENOUS at 02:25

## 2023-02-15 RX ADMIN — HYDROCODONE BITARTRATE AND ACETAMINOPHEN 1 TABLET: 5; 325 TABLET ORAL at 22:45

## 2023-02-15 RX ADMIN — SENNOSIDES 8.6 MG: 8.6 TABLET, FILM COATED ORAL at 08:48

## 2023-02-15 RX ADMIN — MELATONIN 3 MG: at 22:42

## 2023-02-15 RX ADMIN — BACITRACIN ZINC, NEOMYCIN, POLYMYXIN B 1 LARGE APPLICATION: 400; 3.5; 5 OINTMENT TOPICAL at 17:12

## 2023-02-15 RX ADMIN — DOCUSATE SODIUM 100 MG: 100 CAPSULE, LIQUID FILLED ORAL at 17:12

## 2023-02-15 RX ADMIN — BACITRACIN ZINC, NEOMYCIN, POLYMYXIN B 1 LARGE APPLICATION: 400; 3.5; 5 OINTMENT TOPICAL at 08:48

## 2023-02-15 RX ADMIN — FINASTERIDE 5 MG: 5 TABLET, FILM COATED ORAL at 08:48

## 2023-02-15 RX ADMIN — ACETAMINOPHEN 650 MG: 325 TABLET, FILM COATED ORAL at 05:34

## 2023-02-15 RX ADMIN — OXYBUTYNIN CHLORIDE 5 MG: 5 TABLET ORAL at 17:12

## 2023-02-15 RX ADMIN — ACETAMINOPHEN 650 MG: 325 TABLET, FILM COATED ORAL at 12:15

## 2023-02-15 NOTE — PLAN OF CARE
Problem: PAIN - ADULT  Goal: Verbalizes/displays adequate comfort level or baseline comfort level  Description: Interventions:  - Encourage patient to monitor pain and request assistance  - Assess pain using appropriate pain scale  - Administer analgesics based on type and severity of pain and evaluate response  - Implement non-pharmacological measures as appropriate and evaluate response  - Consider cultural and social influences on pain and pain management  - Notify physician/advanced practitioner if interventions unsuccessful or patient reports new pain  Outcome: Progressing     Problem: INFECTION - ADULT  Goal: Absence or prevention of progression during hospitalization  Description: INTERVENTIONS:  - Assess and monitor for signs and symptoms of infection  - Monitor lab/diagnostic results  - Monitor all insertion sites, i e  indwelling lines, tubes, and drains  - Monitor endotracheal if appropriate and nasal secretions for changes in amount and color  - Ranier appropriate cooling/warming therapies per order  - Administer medications as ordered  - Instruct and encourage patient and family to use good hand hygiene technique  - Identify and instruct in appropriate isolation precautions for identified infection/condition  Outcome: Progressing  Goal: Absence of fever/infection during neutropenic period  Description: INTERVENTIONS:  - Monitor WBC    Outcome: Progressing     Problem: SAFETY ADULT  Goal: Patient will remain free of falls  Description: INTERVENTIONS:  - Educate patient/family on patient safety including physical limitations  - Instruct patient to call for assistance with activity   - Consult OT/PT to assist with strengthening/mobility   - Keep Call bell within reach  - Keep bed low and locked with side rails adjusted as appropriate  - Keep care items and personal belongings within reach  - Initiate and maintain comfort rounds  - Make Fall Risk Sign visible to staff  - Offer Toileting every Hours, in advance of need  - Initiate/Maintain   alarm  - Obtain necessary fall risk management equipment:     - Apply yellow socks and bracelet for high fall risk patients  - Consider moving patient to room near nurses station  Outcome: Progressing  Goal: Maintain or return to baseline ADL function  Description: INTERVENTIONS:  -  Assess patient's ability to carry out ADLs; assess patient's baseline for ADL function and identify physical deficits which impact ability to perform ADLs (bathing, care of mouth/teeth, toileting, grooming, dressing, etc )  - Assess/evaluate cause of self-care deficits   - Assess range of motion  - Assess patient's mobility; develop plan if impaired  - Assess patient's need for assistive devices and provide as appropriate  - Encourage maximum independence but intervene and supervise when necessary  - Involve family in performance of ADLs  - Assess for home care needs following discharge   - Consider OT consult to assist with ADL evaluation and planning for discharge  - Provide patient education as appropriate  Outcome: Progressing  Goal: Maintains/Returns to pre admission functional level  Description: INTERVENTIONS:  - Perform BMAT or MOVE assessment daily    - Set and communicate daily mobility goal to care team and patient/family/caregiver  - Collaborate with rehabilitation services on mobility goals if consulted  - Perform Range of Motion    times a day  - Reposition patient every    hours    - Dangle patient    times a day  - Stand patient    times a day  - Ambulate patient    times a day  - Out of bed to chair    times a day   - Out of bed for meals    times a day  - Out of bed for toileting  - Record patient progress and toleration of activity level   Outcome: Progressing     Problem: DISCHARGE PLANNING  Goal: Discharge to home or other facility with appropriate resources  Description: INTERVENTIONS:  - Identify barriers to discharge w/patient and caregiver  - Arrange for needed discharge resources and transportation as appropriate  - Identify discharge learning needs (meds, wound care, etc )  - Arrange for interpretive services to assist at discharge as needed  - Refer to Case Management Department for coordinating discharge planning if the patient needs post-hospital services based on physician/advanced practitioner order or complex needs related to functional status, cognitive ability, or social support system  Outcome: Progressing     Problem: Knowledge Deficit  Goal: Patient/family/caregiver demonstrates understanding of disease process, treatment plan, medications, and discharge instructions  Description: Complete learning assessment and assess knowledge base    Interventions:  - Provide teaching at level of understanding  - Provide teaching via preferred learning methods  Outcome: Progressing

## 2023-02-15 NOTE — PROGRESS NOTES
Progress Note -urology  Ivy Harrell 58 y o  male MRN: [id number]  Unit/Bed#: -01 Encounter: [id number]    Assessment & Plan:    Urinary retention due to benign prostatic hypertrophy:  -Postop day 1 laser enucleation of the prostate with morcellation and transurethral section of prostate TURP, progressing well  -Intra-Op ureteral orifice ease were noted not to be visible  Renal ultrasound obtained to evaluate for hydronephrosis  Renal ultrasound negative for hydronephrosis, patient provided with diet  -Encourage ambulation  -Provide a good bowel regimen  -Continue with antispasmodic medication  -CBI clamped this afternoon however urine noted to be more punch in color  May need to reinitiate this afternoon and continue overnight  We will plan to keep patient an additional day for CBI purposes  Plan for discharge tomorrow  -Creatinine at baseline  -No leukocytosis  -Mild drop in hemoglobin 10 9, continue trend   -Patient hemodynamically stable afebrile  Patient with a history of sleep apnea and does not use a CPAP machine  Uses nasal cannula oxygen at nighttime    Patient require additional day due to hematuria  We will plan for DC tomorrow  Subjective/Objective   Chief Complaint: None    Subjective:   Patient currently sitting comfortably in bed no acute distress  He denies any nausea, vomiting, fevers, chills, flank pain  Reports some suprapubic tenderness and some bladder spasms otherwise doing well  Objective:     Blood pressure 124/61, pulse 66, temperature 97 9 °F (36 6 °C), resp  rate 20, height 5' 9" (1 753 m), weight 122 kg (270 lb), SpO2 96 %  ,Body mass index is 39 87 kg/m²  Intake/Output Summary (Last 24 hours) at 2/15/2023 1400  Last data filed at 2/15/2023 1020  Gross per 24 hour   Intake --   Output 38705 ml   Net -99128 ml       Invasive Devices     Peripheral Intravenous Line  Duration           Peripheral IV 02/14/23 Dorsal (posterior); Left Hand 1 day          Drain Duration           Urethral Catheter Latex; Three way 24 Fr  1 day            Physical Exam  Constitutional:       General: He is not in acute distress  Appearance: He is normal weight  He is not ill-appearing, toxic-appearing or diaphoretic  HENT:      Head: Normocephalic and atraumatic  Right Ear: External ear normal       Left Ear: External ear normal       Nose: Nose normal       Mouth/Throat:      Pharynx: Oropharynx is clear  Eyes:      General: No scleral icterus  Conjunctiva/sclera: Conjunctivae normal    Cardiovascular:      Rate and Rhythm: Normal rate and regular rhythm  Pulses: Normal pulses  Heart sounds: No murmur heard  No friction rub  No gallop  Pulmonary:      Effort: Pulmonary effort is normal  No respiratory distress  Breath sounds: No wheezing, rhonchi or rales  Abdominal:      General: Bowel sounds are normal  There is no distension  Tenderness: There is no abdominal tenderness  Genitourinary:     Comments: Daniels catheter in place with punch colored urine  Musculoskeletal:         General: Normal range of motion  Cervical back: Normal range of motion  Skin:     General: Skin is warm and dry  Neurological:      General: No focal deficit present  Mental Status: He is alert and oriented to person, place, and time  Psychiatric:         Mood and Affect: Mood normal          Thought Content: Thought content normal          Judgment: Judgment normal            Lab, Imaging and other studies:I have personally reviewed pertinent lab results      Lab Results   Component Value Date    WBC 9 86 02/15/2023    HGB 10 9 (L) 02/15/2023    HCT 34 2 (L) 02/15/2023    MCV 88 02/15/2023     02/15/2023     Lab Results   Component Value Date    SODIUM 138 02/15/2023    K 4 2 02/15/2023     02/15/2023    CO2 25 02/15/2023    BUN 20 02/15/2023    CREATININE 0 90 02/15/2023    GLUC 110 02/15/2023    CALCIUM 8 3 02/15/2023       VTE Pharmacologic Prophylaxis: Reason for no pharmacologic prophylaxis Hematuria  VTE Mechanical Prophylaxis: sequential compression device      Yeimy Du PA-C

## 2023-02-16 ENCOUNTER — TELEPHONE (OUTPATIENT)
Dept: UROLOGY | Facility: CLINIC | Age: 63
End: 2023-02-16

## 2023-02-16 VITALS
RESPIRATION RATE: 15 BRPM | HEART RATE: 60 BPM | TEMPERATURE: 98.2 F | DIASTOLIC BLOOD PRESSURE: 78 MMHG | WEIGHT: 270 LBS | SYSTOLIC BLOOD PRESSURE: 159 MMHG | HEIGHT: 69 IN | OXYGEN SATURATION: 94 % | BODY MASS INDEX: 39.99 KG/M2

## 2023-02-16 RX ORDER — PHENAZOPYRIDINE HYDROCHLORIDE 200 MG/1
200 TABLET, FILM COATED ORAL
Qty: 6 TABLET | Refills: 0 | Status: SHIPPED | OUTPATIENT
Start: 2023-02-16 | End: 2023-02-18

## 2023-02-16 RX ORDER — OXYBUTYNIN CHLORIDE 5 MG/1
5 TABLET ORAL 3 TIMES DAILY PRN
Qty: 30 TABLET | Refills: 0 | Status: SHIPPED | OUTPATIENT
Start: 2023-02-16 | End: 2023-02-26

## 2023-02-16 RX ORDER — LEVOFLOXACIN 500 MG/1
500 TABLET, FILM COATED ORAL EVERY 24 HOURS
Qty: 10 TABLET | Refills: 0 | Status: SHIPPED | OUTPATIENT
Start: 2023-02-16 | End: 2023-02-26

## 2023-02-16 RX ADMIN — SENNOSIDES 8.6 MG: 8.6 TABLET, FILM COATED ORAL at 08:34

## 2023-02-16 RX ADMIN — FINASTERIDE 5 MG: 5 TABLET, FILM COATED ORAL at 08:34

## 2023-02-16 RX ADMIN — OXYBUTYNIN CHLORIDE 5 MG: 5 TABLET ORAL at 08:34

## 2023-02-16 RX ADMIN — DOCUSATE SODIUM 100 MG: 100 CAPSULE, LIQUID FILLED ORAL at 08:34

## 2023-02-16 RX ADMIN — NICOTINE 14 MG: 14 PATCH, EXTENDED RELEASE TRANSDERMAL at 08:32

## 2023-02-16 RX ADMIN — BACITRACIN ZINC, NEOMYCIN, POLYMYXIN B 1 LARGE APPLICATION: 400; 3.5; 5 OINTMENT TOPICAL at 08:49

## 2023-02-16 NOTE — PLAN OF CARE
Problem: PAIN - ADULT  Goal: Verbalizes/displays adequate comfort level or baseline comfort level  Description: Interventions:  - Encourage patient to monitor pain and request assistance  - Assess pain using appropriate pain scale  - Administer analgesics based on type and severity of pain and evaluate response  - Implement non-pharmacological measures as appropriate and evaluate response  - Consider cultural and social influences on pain and pain management  - Notify physician/advanced practitioner if interventions unsuccessful or patient reports new pain  Outcome: Progressing     Problem: SAFETY ADULT  Goal: Patient will remain free of falls  Description: INTERVENTIONS:  - Educate patient/family on patient safety including physical limitations  - Instruct patient to call for assistance with activity   - Consult OT/PT to assist with strengthening/mobility   - Keep Call bell within reach  - Keep bed low and locked with side rails adjusted as appropriate  - Keep care items and personal belongings within reach  - Initiate and maintain comfort rounds  - Make Fall Risk Sign visible to staff  - Offer Toileting every Hours, in advance of need  - Initiate/Maintain alarm  - Obtain necessary fall risk management equipment:   - Apply yellow socks and bracelet for high fall risk patients  - Consider moving patient to room near nurses station  Outcome: Progressing

## 2023-02-16 NOTE — DISCHARGE SUMMARY
Discharge Summary - Carrol Razo 58 y o  male MRN: [id number]    Unit/Bed#: -01 Encounter: [id number]    Admission Date: 2/14/2023     Discharge Date: 02/16/23    HPI: Carrol Razo is a 58 y o  male who presented for a HOLEP by Dr Luis Alberto Blanc for treatment of benign prostatic hyperplasia with bladder outlet obstruction/urinary retention     Procedure(s):  LASER ENUCLEATION PROSTATE W MORCELLATION  TRANSURETHRAL RESECTION OF PROSTATE (TURP)  Surgeon(s):  Thaddeus Vazquez MD  2/14/2023    Hospital Course: Patient presented to the hospital for scheduled outpatient laser enucleation of the prostate with morcellation and transurethral resection of the prostate on 12/14  Intraoperatively left ureteral orifice was not able to be visualized at the end of the case  An ultrasound was ordered on 12/15 to assess for presence of hydronephrosis but ultrasound came back negative  On 12/15 patient had a mild drop in hemoglobin at 10 9  His CBI was clamped but his urine was noted to be punch in color  Patient has CBI overnight  Today on reexamination patient had CBI clamped with clear yellow urine  Overall patient appears stable for discharge    Discharge Diagnosis: Urinary retention due to benign prostatic hyperplasia    Condition at Discharge: good    Discharge Medications:  See after visit summary for reconciled discharge medications provided to patient and family  Patient was discharged home on home medications with the addition of Levaquin, Pyridium,  Discharge instructions/Information to patient and family:   See after visit summary for written and verbal information which has been provided to patient and family  Provisions for Follow-Up Care:  See after visit summary for information related to follow-up care and any pertinent home health orders  Disposition: Home    Planned Readmission: No    Discharge Statement   I spent 20 minutes discharging the patient   This time was spent on the day of discharge  I had direct contact with the patient on the day of discharge  Additional documentation is required if more than 30 minutes were spent on discharge       Signature:   Mary Anne Muro PA-C  Date: 2/16/2023 Time: 10:47 AM

## 2023-02-16 NOTE — PROGRESS NOTES
UROLOGY PROGRESS NOTE   Patient Identifiers: Alexsandra Freeman (MRN [id number])  Date of Service: 2/16/2023        Assessment:   POD #2 from HoLEP which was a difficult case and not able to identify left ureteral orifice at the end of surgery    Plan:   -Doing well  Urine clear  Appropriate for discharge later today if this remains ambulation  -Etiology of right shoulder pain is not clear as his arms were by his sides during surgery  -If patient is discharged we will plan for follow-up for catheter removal next week and then again with a repeat renal ultrasound in approximately 1 month  -        Subjective:     24 HR EVENTS:     Patient passing gas but not had a bowel movement  Able to sleep last night  CBI is off and urine is clear  Ultrasound yesterday not show hydronephrosis  Patient continues to deny flank pain on either side    He does have some right shoulder pain since surgery    Objective:     VITALS:    Vitals:    02/16/23 0921   BP: 159/78   Pulse: 60   Resp: 15   Temp:    SpO2: 94%       INS & OUTS:  [unfilled]    LABS:  Lab Results   Component Value Date    HGB 10 9 (L) 02/15/2023    HCT 34 2 (L) 02/15/2023    WBC 9 86 02/15/2023     02/15/2023   ]    Lab Results   Component Value Date    K 4 2 02/15/2023     02/15/2023    CO2 25 02/15/2023    BUN 20 02/15/2023    CREATININE 0 90 02/15/2023    CALCIUM 8 3 02/15/2023   ]    INPATIENT MEDS:    Current Facility-Administered Medications:   •  acetaminophen (TYLENOL) tablet 650 mg, 650 mg, Oral, Q6H PRN, Satish Bautista PA-C  •  docusate sodium (COLACE) capsule 100 mg, 100 mg, Oral, BID, Isabell Garcia MD, 100 mg at 02/16/23 8982  •  finasteride (PROSCAR) tablet 5 mg, 5 mg, Oral, Daily, Isabell Garcia MD, 5 mg at 02/16/23 3881  •  HYDROcodone-acetaminophen (NORCO) 5-325 mg per tablet 1 tablet, 1 tablet, Oral, Q6H PRN, Satish Bautista PA-C, 1 tablet at 02/15/23 7198  •  melatonin tablet 3 mg, 3 mg, Oral, HS, Guille Snell PA-C, 3 mg at 02/15/23 2242  •  neomycin-bacitracin-polymyxin b (NEOSPORIN) ointment 1 large application, 1 large application, Topical, BID, Marlon Leyden, MD, 1 large application at 42/64/79 0849  •  nicotine (NICODERM CQ) 14 mg/24hr TD 24 hr patch 14 mg, 14 mg, Transdermal, Daily, Marlon Leyden, MD, 14 mg at 02/16/23 3564  •  ondansetron (ZOFRAN) injection 4 mg, 4 mg, Intravenous, Q6H PRN, Marlon Leyden, MD  •  oxybutynin (DITROPAN) tablet 5 mg, 5 mg, Oral, BID, Marlon Leyden, MD, 5 mg at 02/16/23 7844  •  senna (SENOKOT) tablet 8 6 mg, 1 tablet, Oral, Daily, Marlon Leyden, MD, 8 6 mg at 02/16/23 0834      Physical Exam:   /78 (BP Location: Right arm)   Pulse 60   Temp 98 2 °F (36 8 °C)   Resp 15   Ht 5' 9" (1 753 m)   Wt 122 kg (270 lb)   SpO2 94%   BMI 39 87 kg/m²   GEN: No acute distress  RESP: breathing comfortably with no accessory muscle use  CV: no significant peripheral edema  ABD: soft, non-tender, non-distended  INCISION: none  DRAINS: none  COX: in place draining clear yellow urine    RADIOLOGY:   Renal ultrasound personally reviewed  No hydronephrosis seen  RENAL ULTRASOUND     INDICATION:   pt status post prostate surgery and difficulty visualizing ureteral orifices  evaluate for hydronephrosis      COMPARISON: CT 8/24/2022      TECHNIQUE:   Ultrasound of the retroperitoneum was performed with a curvilinear transducer utilizing volumetric sweeps and still imaging techniques       FINDINGS:     KIDNEYS:  Symmetric and normal size  Right kidney:  10 7 x 5 5 x 5 9 cm  Volume 181 8 mL  Left kidney:  12 1 x 5 9 x 6 6 cm  Volume 243 9 mL     Right kidney  Normal echogenicity and contour  No mass is identified  No hydronephrosis  No shadowing calculi  No perinephric fluid collections      Left kidney  Normal echogenicity and contour  No mass is identified  No hydronephrosis  No shadowing calculi    No perinephric fluid collections      URETERS:  Nonvisualized      BLADDER:   A cox catheter is in place, decompressing the bladder and limiting its evaluation      IMPRESSION:     No hydronephrosis

## 2023-02-16 NOTE — PROGRESS NOTES
Progress Note - Urology  Fadumo Quinonez 1960, 58 y o  male MRN: [id number]    Unit/Bed#: -01 Encounter: [id number]    28-year-old male who is postop day 2 for laser nucleation of the prostate with morcellation and transurethral resection of the prostate (TURP) for treatment of benign prostatic hyperplasia with urinary retention     -Patient stayed overnight due to hematuria and mild drop in hemoglobin   -Intra-Op ureteral orifice was not noted to be visible but a renal ultrasound that was obtained on POD 1 did not demonstrate hydronephrosis  -Today:   -Patient is hypertensive but otherwise vital signs are stable   -Patient remains afebrile   -No TAYLOR or leukocytosis noted on POD 1   -Daniels catheter is in place draining clear yellow urine  No hematuria  -Outpatient follow-up has already been passed to the office  Plan for Daniels removal next week, repeat ultrasound in 3 to 4 weeks, and follow-up appointment in a month  -Overall patient appears stable for discharge  Subjective:     HPI: Patient reports that he is doing well  His pain is well controlled  Eating and drinking without nausea or vomiting  Has been passing flatus-no bowel movement as of yet  Ambualting without difficulty  He denies fever, chills, chest pain,  difficulty breathing, calf pain  He endorses throat pain that is likely result of intubation      Review of Systems   Constitutional: Negative  Negative for chills and fever  HENT: Positive for sore throat  Eyes: Negative  Respiratory: Negative  Negative for shortness of breath  Cardiovascular: Negative  Negative for chest pain  Gastrointestinal: Negative  Endocrine: Negative  Genitourinary: Negative  Negative for dysuria, flank pain, hematuria and urgency  Musculoskeletal: Negative  Allergic/Immunologic: Negative  Neurological: Negative  Hematological: Negative  Psychiatric/Behavioral: Negative          Objective:    Vitals: Blood pressure 159/78, pulse 60, temperature 98 2 °F (36 8 °C), resp  rate 15, height 5' 9" (1 753 m), weight 122 kg (270 lb), SpO2 94 %  ,Body mass index is 39 87 kg/m²  GEN: alert and oriented x 3 no acute distress  RESP: breathing comfortably with no accessory muscle use  Lungs clear to auscultation bilaterally  ABD: soft, non-tender, non-distended   INCISION: none  EXT: no significant peripheral edema   COX: In place draining clear yellow urine with no hematuria or clots    Imaging:    Renal ultrasound       FINDINGS:     KIDNEYS:  Symmetric and normal size  Right kidney:  10 7 x 5 5 x 5 9 cm  Volume 181 8 mL  Left kidney:  12 1 x 5 9 x 6 6 cm  Volume 243 9 mL     Right kidney  Normal echogenicity and contour  No mass is identified  No hydronephrosis  No shadowing calculi  No perinephric fluid collections      Left kidney  Normal echogenicity and contour  No mass is identified  No hydronephrosis  No shadowing calculi  No perinephric fluid collections      URETERS:  Nonvisualized      BLADDER:   A cox catheter is in place, decompressing the bladder and limiting its evaluation      IMPRESSION:     No hydronephrosis    Imaging reviewed - both report and images personally reviewed       Labs:  Recent Labs     02/15/23  0534   WBC 9 86     Recent Labs     02/14/23  1337 02/15/23  0534   HGB 12 8 10 9*     Recent Labs     02/14/23  1337 02/15/23  0534   HCT 39 2 34 2*     Recent Labs     02/14/23  1337 02/15/23  0534   CREATININE 1 13 0 90         History:  Past Medical History:   Diagnosis Date   • Enlarged prostate    • Cox catheter in place    • Hyperlipidemia    • Sleep apnea     does not have a cpap machine   • Tinnitus    • Unable to empty bladder    • Wears reading eyeglasses      Social History     Socioeconomic History   • Marital status: Single     Spouse name: None   • Number of children: None   • Years of education: None   • Highest education level: None   Occupational History   • None   Tobacco Use • Smoking status: Former     Types: Cigarettes     Quit date:      Years since quittin 1   • Smokeless tobacco: Never   Vaping Use   • Vaping Use: Every day   • Substances: Nicotine, Flavoring   Substance and Sexual Activity   • Alcohol use: Yes     Comment: rarely   • Drug use: Yes     Types: Marijuana     Comment: rarely - once every 5-6 months   • Sexual activity: Not Currently   Other Topics Concern   • None   Social History Narrative   • None     Social Determinants of Health     Financial Resource Strain: Not on file   Food Insecurity: No Food Insecurity   • Worried About Running Out of Food in the Last Year: Never true   • Ran Out of Food in the Last Year: Never true   Transportation Needs: No Transportation Needs   • Lack of Transportation (Medical): No   • Lack of Transportation (Non-Medical): No   Physical Activity: Not on file   Stress: Not on file   Social Connections: Not on file   Intimate Partner Violence: Not on file   Housing Stability: Unknown   • Unable to Pay for Housing in the Last Year: No   • Number of Places Lived in the Last Year: Not on file   • Unstable Housing in the Last Year: No     Past Surgical History:   Procedure Laterality Date   • APPENDECTOMY     • CLAVICLE FRACTURE REPAIR     • CYST REMOVAL     • HERNIA REPAIR     • AR BX PROSTATE STRTCTC SATURATION SAMPLING IMG GID N/A 2022    Procedure: [address];  Surgeon: Shayy Melendez MD;  Location: [facility];  Service: Urology   • AR LASER ENUCLEATION PROSTATE W/MORCELLATION N/A 2023    Procedure: LASER ENUCLEATION PROSTATE W MORCELLATION;  Surgeon: Rosana Mcneill MD;  Location: [facility];  Service: Urology   • TONSILLECTOMY     • TRANSURETHRAL RESECTION OF PROSTATE N/A 2023    Procedure: TRANSURETHRAL RESECTION OF PROSTATE (TURP);   Surgeon: Rosana Mcneill MD;  Location: [facility];  Service: Urology   • UVULECTOMY       Family History   Problem Relation Age of Onset   • Dementia Mother    • Prostate cancer Father        Lizette Kelsey [place]  Date: 2/16/2023 Time: 9:59 AM

## 2023-02-21 ENCOUNTER — PROCEDURE VISIT (OUTPATIENT)
Dept: UROLOGY | Facility: CLINIC | Age: 63
End: 2023-02-21

## 2023-02-21 VITALS — BODY MASS INDEX: 37.77 KG/M2 | WEIGHT: 255 LBS | HEIGHT: 69 IN

## 2023-02-21 DIAGNOSIS — R33.9 URINARY RETENTION: Primary | ICD-10-CM

## 2023-02-21 LAB — POST-VOID RESIDUAL VOLUME, ML POC: 24 ML

## 2023-02-21 NOTE — PROGRESS NOTES
2/21/2023    \[facility] [place] [facility]\""e  1960  [id number]    Diagnosis  Chief Complaint    Benign Prostatic Hypertrophy         Patient presents for void trial managed by Dr Theresa Kuhn  Return for appt on 3/22/23 with renal US prior to visit    Procedure Daniels removal/voiding trial    Daniels catheter removed after deflation of an intact balloon  Patient tolerated well  Encouraged patient to hydrate well and return this afternoon for post void residual   he knows he may return early if uncomfortable and unable to urinate  Patient agrees to this plan  Patient returned this afternoon  Patient states able to void  Patient voided while in office  Bladder ultrasound performed and PVR measured 24ml  Patient feels comfortable with his urination  His only complaint is a little blood in urine and pain on his R side of hip  Dr Cabrera Mora did call patient while he was in exam room  Patient did ask Dr Cabrera Mora for a pain med due to hip pain, but was told most likely it is musculoskeletal and to treat with ibuprofen or Tylenol, no narcotics  Will continue to monitor for now  Patient is aware to have renal US prior to his next f/u appt here 3/22/23  He was provided with the script and central scheduling phone number along with Primocaregel exercise instructions  He is aware if the phone lines are busy, he can message us thru [facility]      Recent Results (from the past 4 hour(s))   POCT Measure PVR    Collection Time: 02/21/23  2:17 PM   Result Value Ref Range    POST-VOID RESIDUAL VOLUME, ML POC 24 mL           Vitals:    02/21/23 0817   Weight: 116 kg (255 lb)   Height: 5' 9" (1 753 m)           Jose Juan Nagy, RN,BSN Addended by: Shala Chaudhry on: 10/31/2017 09:54 AM     Modules accepted: Orders

## 2023-03-15 ENCOUNTER — HOSPITAL ENCOUNTER (OUTPATIENT)
Dept: ULTRASOUND IMAGING | Facility: HOSPITAL | Age: 63
Discharge: HOME | End: 2023-03-15

## 2023-03-15 DIAGNOSIS — R33.8 URINARY RETENTION DUE TO BENIGN PROSTATIC HYPERPLASIA: ICD-10-CM

## 2023-03-15 DIAGNOSIS — N40.1 URINARY RETENTION DUE TO BENIGN PROSTATIC HYPERPLASIA: ICD-10-CM

## 2023-03-22 ENCOUNTER — OFFICE VISIT (OUTPATIENT)
Dept: UROLOGY | Facility: CLINIC | Age: 63
End: 2023-03-22

## 2023-03-22 VITALS
HEIGHT: 69 IN | BODY MASS INDEX: 37.77 KG/M2 | WEIGHT: 255 LBS | SYSTOLIC BLOOD PRESSURE: 140 MMHG | DIASTOLIC BLOOD PRESSURE: 80 MMHG

## 2023-03-22 DIAGNOSIS — R33.9 URINARY RETENTION: Primary | ICD-10-CM

## 2023-03-22 DIAGNOSIS — N52.8 OTHER MALE ERECTILE DYSFUNCTION: ICD-10-CM

## 2023-03-22 LAB — POST-VOID RESIDUAL VOLUME, ML POC: 22 ML

## 2023-03-22 RX ORDER — TADALAFIL 20 MG/1
20 TABLET ORAL DAILY PRN
Qty: 30 TABLET | Refills: 3 | Status: SHIPPED | OUTPATIENT
Start: 2023-03-22

## 2023-03-22 NOTE — PROGRESS NOTES
UROLOGY PROGRESS NOTE   Patient Identifiers: Alexandro Tobar (MRN [id number])  Date of Service: 3/22/2023    Subjective:   60-year-old male with history of catheter dependent urinary retention  He underwent HoLEP procedure February 14  Pathology was 56 g of benign prostate tissue  He has not had any further hematuria  He feels empty his PVR is 25 mL  He does have some urgency and frequency as expected  He has no incontinence  Reason for visit: Catheter dependent urinary retention/status post HoLEP follow-up    Objective:     VITALS:    Vitals:    03/22/23 1431   BP: 140/80           LABS:  Lab Results   Component Value Date    HGB 10 9 (L) 02/15/2023    HCT 34 2 (L) 02/15/2023    WBC 9 86 02/15/2023     02/15/2023   ]    Lab Results   Component Value Date    K 4 2 02/15/2023     02/15/2023    CO2 25 02/15/2023    BUN 20 02/15/2023    CREATININE 0 90 02/15/2023    CALCIUM 8 3 02/15/2023   ]        INPATIENT MEDS:    Current Outpatient Medications:   •  finasteride (PROSCAR) 5 mg tablet, Take 1 tablet (5 mg total) by mouth daily, Disp: 90 tablet, Rfl: 3  •  lidocaine (XYLOCAINE) 2 % topical gel, Apply topically as needed for mild pain, Disp: 30 mL, Rfl: 0  •  Multiple Vitamins-Minerals (EMERGEN-C IMMUNE PO), Take by mouth in the morning, Disp: , Rfl:   •  tadalafil (CIALIS) 20 MG tablet, Take 1 tablet (20 mg total) by mouth daily as needed for erectile dysfunction, Disp: 30 tablet, Rfl: 3      Physical Exam:   /80 (BP Location: Left arm, Patient Position: Sitting, Cuff Size: Adult)   Ht 5' 9" (1 753 m)   Wt 116 kg (255 lb)   BMI 37 66 kg/m²   GEN: no acute distress    RESP: breathing comfortably with no accessory muscle use    ABD: soft, non-tender, non-distended   I   EXT: no significant peripheral edema       RADIOLOGY:   None    Assessment:   #1    Catheter dependent urinary retention status post HoLEP    Plan:   -Follow-up in 3 months to reevaluate symptoms  -  -  -

## 2023-07-10 ENCOUNTER — OFFICE VISIT (OUTPATIENT)
Dept: UROLOGY | Facility: CLINIC | Age: 63
End: 2023-07-10
Payer: MEDICARE

## 2023-07-10 VITALS
HEIGHT: 69 IN | BODY MASS INDEX: 40.58 KG/M2 | WEIGHT: 274 LBS | DIASTOLIC BLOOD PRESSURE: 84 MMHG | SYSTOLIC BLOOD PRESSURE: 130 MMHG

## 2023-07-10 DIAGNOSIS — N52.8 OTHER MALE ERECTILE DYSFUNCTION: ICD-10-CM

## 2023-07-10 PROCEDURE — 99213 OFFICE O/P EST LOW 20 MIN: CPT | Performed by: PHYSICIAN ASSISTANT

## 2023-07-10 RX ORDER — TADALAFIL 20 MG/1
20 TABLET ORAL DAILY PRN
Qty: 30 TABLET | Refills: 3 | Status: SHIPPED | OUTPATIENT
Start: 2023-07-10

## 2023-07-10 RX ORDER — SILDENAFIL 100 MG/1
100 TABLET, FILM COATED ORAL DAILY PRN
Qty: 30 TABLET | Refills: 3 | Status: SHIPPED | OUTPATIENT
Start: 2023-07-10 | End: 2023-07-11

## 2023-07-10 NOTE — PROGRESS NOTES
UROLOGY PROGRESS NOTE   Patient Identifiers: Denilson Queen (MRN [id number])  Date of Service: 7/10/2023    Subjective:   71-year-old man with history of catheter dependent urinary retention. He had a HoLEP procedure in February. Doing well and empties well. Still has some nocturia patient likely related to sleep apnea and his breathing. Reason for visit: BPH/HoLEP follow-up    Objective:     VITALS:    Vitals:    07/10/23 1426   BP: 130/84           LABS:  Lab Results   Component Value Date    HGB 10.9 (L) 02/15/2023    HCT 34.2 (L) 02/15/2023    WBC 9.86 02/15/2023     02/15/2023   ]    Lab Results   Component Value Date    K 4.2 02/15/2023     02/15/2023    CO2 25 02/15/2023    BUN 20 02/15/2023    CREATININE 0.90 02/15/2023    CALCIUM 8.3 02/15/2023   ]        INPATIENT MEDS:    Current Outpatient Medications:   •  lidocaine (XYLOCAINE) 2 % topical gel, Apply topically as needed for mild pain, Disp: 30 mL, Rfl: 0  •  sildenafil (VIAGRA) 100 mg tablet, Take 1 tablet (100 mg total) by mouth daily as needed for erectile dysfunction, Disp: 30 tablet, Rfl: 3  •  tadalafil (CIALIS) 20 MG tablet, Take 1 tablet (20 mg total) by mouth daily as needed for erectile dysfunction, Disp: 30 tablet, Rfl: 3      Physical Exam:   /84 (BP Location: Left arm, Patient Position: Sitting, Cuff Size: Adult)   Ht 5' 9" (1.753 m)   Wt 124 kg (274 lb)   BMI 40.46 kg/m²   GEN: no acute distress    RESP: Dyspneic at rest    ABD: soft, non-tender, non-distended     EXT: no significant peripheral edema       RADIOLOGY:   None    Assessment:   #1.   BPH/HoLEP    Plan:   -Follow-up in 6 months with PSA prior to visit  -  -  -

## 2023-07-11 ENCOUNTER — OFFICE VISIT (OUTPATIENT)
Dept: FAMILY MEDICINE CLINIC | Facility: CLINIC | Age: 63
End: 2023-07-11
Payer: MEDICARE

## 2023-07-11 VITALS
SYSTOLIC BLOOD PRESSURE: 136 MMHG | DIASTOLIC BLOOD PRESSURE: 80 MMHG | HEART RATE: 79 BPM | RESPIRATION RATE: 16 BRPM | BODY MASS INDEX: 40.73 KG/M2 | OXYGEN SATURATION: 96 % | WEIGHT: 275 LBS | HEIGHT: 69 IN

## 2023-07-11 DIAGNOSIS — E66.01 CLASS 3 SEVERE OBESITY DUE TO EXCESS CALORIES WITH SERIOUS COMORBIDITY AND BODY MASS INDEX (BMI) OF 40.0 TO 44.9 IN ADULT (HCC): ICD-10-CM

## 2023-07-11 DIAGNOSIS — E55.9 VITAMIN D DEFICIENCY: ICD-10-CM

## 2023-07-11 DIAGNOSIS — Z79.899 OTHER LONG TERM (CURRENT) DRUG THERAPY: ICD-10-CM

## 2023-07-11 DIAGNOSIS — E78.2 MIXED HYPERLIPIDEMIA: ICD-10-CM

## 2023-07-11 DIAGNOSIS — K76.0 FATTY LIVER: ICD-10-CM

## 2023-07-11 DIAGNOSIS — D64.9 ANEMIA, UNSPECIFIED TYPE: ICD-10-CM

## 2023-07-11 DIAGNOSIS — E53.8 B12 DEFICIENCY: ICD-10-CM

## 2023-07-11 DIAGNOSIS — Z00.00 WELL ADULT EXAM: Primary | ICD-10-CM

## 2023-07-11 PROBLEM — Z71.2 ENCOUNTER TO DISCUSS TEST RESULTS: Status: RESOLVED | Noted: 2022-09-06 | Resolved: 2023-07-11

## 2023-07-11 PROBLEM — Z97.8 FOLEY CATHETER IN PLACE: Status: RESOLVED | Noted: 2022-09-06 | Resolved: 2023-07-11

## 2023-07-11 PROBLEM — R31.0 GROSS HEMATURIA: Status: RESOLVED | Noted: 2022-09-07 | Resolved: 2023-07-11

## 2023-07-11 PROCEDURE — 99396 PREV VISIT EST AGE 40-64: CPT | Performed by: FAMILY MEDICINE

## 2023-07-11 PROCEDURE — 99214 OFFICE O/P EST MOD 30 MIN: CPT | Performed by: FAMILY MEDICINE

## 2023-07-11 RX ORDER — KETOCONAZOLE 20 MG/ML
SHAMPOO TOPICAL ONCE
COMMUNITY

## 2023-07-11 NOTE — PROGRESS NOTES
Assessment/Plan:    1. Well adult exam  -     TSH, 3rd generation with Free T4 reflex; Future; Expected date: 07/11/2023  -     Lipid panel; Future; Expected date: 07/11/2023  -     Comprehensive metabolic panel; Future; Expected date: 07/11/2023  -     CBC; Future; Expected date: 07/11/2023  -     UA w Reflex to Microscopic w Reflex to Culture; Future; Expected date: 07/11/2023    2. Mixed hyperlipidemia  -     TSH, 3rd generation with Free T4 reflex; Future; Expected date: 07/11/2023  -     Lipid panel; Future; Expected date: 07/11/2023    3. B12 deficiency  -     TSH, 3rd generation with Free T4 reflex; Future; Expected date: 07/11/2023  -     Vitamin B12; Future; Expected date: 07/11/2023    4. Vitamin D deficiency  -     TSH, 3rd generation with Free T4 reflex; Future; Expected date: 07/11/2023  -     Vitamin D 25 hydroxy; Future; Expected date: 07/11/2023    5. Class 3 severe obesity due to excess calories with serious comorbidity and body mass index (BMI) of 40.0 to 44.9 in adult (HCC)  -     TSH, 3rd generation with Free T4 reflex; Future; Expected date: 07/11/2023    6. Fatty liver  -     TSH, 3rd generation with Free T4 reflex; Future; Expected date: 07/11/2023  -     US abdomen complete; Future; Expected date: 07/11/2023    7. Other long term (current) drug therapy  -     TSH, 3rd generation with Free T4 reflex; Future; Expected date: 07/11/2023  -     Hemoglobin A1C; Future; Expected date: 07/11/2023    8. Anemia, unspecified type  -     Iron Panel (Includes Ferritin, Iron Sat%, Iron, and TIBC); Future         Subjective:      Patient ID: Nadine Hernandez is a 58 y.o. male.     HPI  Here to establish  Has prostate issues   And scalp issues - using ketocon 1%     Was in icu for anabolic shock   Better now     Not eating well   Not interested in bambi treatment    fatty liver suspected on renal US   Will need further work up       The following portions of the patient's history were reviewed and updated as appropriate: allergies, current medications, past family history, past medical history, past social history, past surgical history and problem list.    Review of Systems   Constitutional: Negative for activity change, appetite change and fever. HENT: Negative for congestion, nosebleeds and trouble swallowing. Eyes: Negative for itching. Respiratory: Negative for cough and chest tightness. Cardiovascular: Negative for chest pain and palpitations. Gastrointestinal: Negative for abdominal pain, constipation, diarrhea and nausea. Endocrine: Negative for cold intolerance. Genitourinary: Negative for frequency. Musculoskeletal: Negative for gait problem and joint swelling. Skin: Negative for rash. Allergic/Immunologic: Negative for immunocompromised state. Neurological: Negative for dizziness, tremors, seizures, syncope and headaches. Psychiatric/Behavioral: Negative for hallucinations and suicidal ideas. Objective:      /80 (BP Location: Left arm, Patient Position: Sitting, Cuff Size: Large)   Pulse 79   Resp 16   Ht 5' 9" (1.753 m)   Wt 125 kg (275 lb)   SpO2 96%   BMI 40.61 kg/m²     No visits with results within 2 Week(s) from this visit. Latest known visit with results is:   Office Visit on 03/22/2023   Component Date Value   • POST-VOID RESIDUAL VOLUM* 03/22/2023 22           Physical Exam  Vitals and nursing note reviewed. Constitutional:       General: He is not in acute distress. Appearance: He is well-developed. He is obese. HENT:      Head: Normocephalic and atraumatic. Cardiovascular:      Rate and Rhythm: Normal rate and regular rhythm. Heart sounds: Normal heart sounds. No murmur heard. Pulmonary:      Effort: Pulmonary effort is normal.      Breath sounds: Normal breath sounds. No wheezing or rales. Abdominal:      General: Bowel sounds are normal. There is no distension. Palpations: Abdomen is soft. Tenderness:  There is no abdominal tenderness. There is no guarding or rebound. Musculoskeletal:         General: No tenderness. Normal range of motion. Cervical back: Normal range of motion and neck supple. Lymphadenopathy:      Cervical: No cervical adenopathy. Skin:     General: Skin is warm and dry. Capillary Refill: Capillary refill takes less than 2 seconds. Findings: No rash. Neurological:      Mental Status: He is alert and oriented to person, place, and time. Cranial Nerves: No cranial nerve deficit. Sensory: No sensory deficit. Motor: No abnormal muscle tone. Psychiatric:         Behavior: Behavior normal.         Thought Content: Thought content normal.         Judgment: Judgment normal.         BMI Counseling: Body mass index is 40.61 kg/m². The BMI is above normal. Nutrition recommendations include decreasing portion sizes, encouraging healthy choices of fruits and vegetables, decreasing fast food intake, consuming healthier snacks and limiting drinks that contain sugar. Exercise recommendations include exercising 3-5 times per week. No pharmacotherapy was ordered. Rationale for BMI follow-up plan is due to patient being overweight or obese. Depression Screening and Follow-up Plan: Patient was screened for depression during today's encounter.  They screened negative with a PHQ-2 score of 0.        Sugar Watson MD  [facility]

## 2023-07-14 ENCOUNTER — APPOINTMENT (OUTPATIENT)
Dept: LAB | Facility: CLINIC | Age: 63
End: 2023-07-14
Payer: MEDICARE

## 2023-07-14 DIAGNOSIS — Z79.899 OTHER LONG TERM (CURRENT) DRUG THERAPY: ICD-10-CM

## 2023-07-14 DIAGNOSIS — E53.8 B12 DEFICIENCY: ICD-10-CM

## 2023-07-14 DIAGNOSIS — E66.01 CLASS 3 SEVERE OBESITY DUE TO EXCESS CALORIES WITH SERIOUS COMORBIDITY AND BODY MASS INDEX (BMI) OF 40.0 TO 44.9 IN ADULT (HCC): ICD-10-CM

## 2023-07-14 DIAGNOSIS — E78.2 MIXED HYPERLIPIDEMIA: ICD-10-CM

## 2023-07-14 DIAGNOSIS — K76.0 FATTY LIVER: ICD-10-CM

## 2023-07-14 DIAGNOSIS — E55.9 VITAMIN D DEFICIENCY: ICD-10-CM

## 2023-07-14 DIAGNOSIS — D64.9 ANEMIA, UNSPECIFIED TYPE: ICD-10-CM

## 2023-07-14 DIAGNOSIS — Z00.00 WELL ADULT EXAM: ICD-10-CM

## 2023-07-14 LAB
25(OH)D3 SERPL-MCNC: 22.3 NG/ML (ref 30–100)
ALBUMIN SERPL BCP-MCNC: 3.8 G/DL (ref 3.5–5)
ALP SERPL-CCNC: 65 U/L (ref 46–116)
ALT SERPL W P-5'-P-CCNC: 50 U/L (ref 12–78)
ANION GAP SERPL CALCULATED.3IONS-SCNC: 0 MMOL/L
AST SERPL W P-5'-P-CCNC: 22 U/L (ref 5–45)
BACTERIA UR QL AUTO: ABNORMAL /HPF
BILIRUB SERPL-MCNC: 0.54 MG/DL (ref 0.2–1)
BILIRUB UR QL STRIP: NEGATIVE
BUN SERPL-MCNC: 16 MG/DL (ref 5–25)
CALCIUM SERPL-MCNC: 9.1 MG/DL (ref 8.3–10.1)
CHLORIDE SERPL-SCNC: 109 MMOL/L (ref 96–108)
CHOLEST SERPL-MCNC: 194 MG/DL
CLARITY UR: CLEAR
CO2 SERPL-SCNC: 27 MMOL/L (ref 21–32)
COLOR UR: YELLOW
CREAT SERPL-MCNC: 1.07 MG/DL (ref 0.6–1.3)
ERYTHROCYTE [DISTWIDTH] IN BLOOD BY AUTOMATED COUNT: 13.8 % (ref 11.6–15.1)
EST. AVERAGE GLUCOSE BLD GHB EST-MCNC: 117 MG/DL
FERRITIN SERPL-MCNC: 60 NG/ML (ref 24–336)
GFR SERPL CREATININE-BSD FRML MDRD: 73 ML/MIN/1.73SQ M
GLUCOSE P FAST SERPL-MCNC: 95 MG/DL (ref 65–99)
GLUCOSE UR STRIP-MCNC: NEGATIVE MG/DL
HBA1C MFR BLD: 5.7 %
HCT VFR BLD AUTO: 45.4 % (ref 36.5–49.3)
HDLC SERPL-MCNC: 39 MG/DL
HGB BLD-MCNC: 14.7 G/DL (ref 12–17)
HGB UR QL STRIP.AUTO: NEGATIVE
HYALINE CASTS #/AREA URNS LPF: ABNORMAL /LPF
IRON SATN MFR SERPL: 18 % (ref 20–50)
IRON SERPL-MCNC: 85 UG/DL (ref 65–175)
KETONES UR STRIP-MCNC: NEGATIVE MG/DL
LDLC SERPL CALC-MCNC: 117 MG/DL (ref 0–100)
LEUKOCYTE ESTERASE UR QL STRIP: NEGATIVE
MCH RBC QN AUTO: 28.4 PG (ref 26.8–34.3)
MCHC RBC AUTO-ENTMCNC: 32.4 G/DL (ref 31.4–37.4)
MCV RBC AUTO: 88 FL (ref 82–98)
MUCOUS THREADS UR QL AUTO: ABNORMAL
NITRITE UR QL STRIP: NEGATIVE
NON-SQ EPI CELLS URNS QL MICRO: ABNORMAL /HPF
NONHDLC SERPL-MCNC: 155 MG/DL
PH UR STRIP.AUTO: 5.5 [PH]
PLATELET # BLD AUTO: 181 THOUSANDS/UL (ref 149–390)
PMV BLD AUTO: 10.9 FL (ref 8.9–12.7)
POTASSIUM SERPL-SCNC: 4.5 MMOL/L (ref 3.5–5.3)
PROT SERPL-MCNC: 8.1 G/DL (ref 6.4–8.4)
PROT UR STRIP-MCNC: ABNORMAL MG/DL
RBC # BLD AUTO: 5.18 MILLION/UL (ref 3.88–5.62)
RBC #/AREA URNS AUTO: ABNORMAL /HPF
SODIUM SERPL-SCNC: 136 MMOL/L (ref 135–147)
SP GR UR STRIP.AUTO: 1.02 (ref 1–1.03)
TIBC SERPL-MCNC: 465 UG/DL (ref 250–450)
TRIGL SERPL-MCNC: 188 MG/DL
TSH SERPL DL<=0.05 MIU/L-ACNC: 2.83 UIU/ML (ref 0.45–4.5)
UROBILINOGEN UR STRIP-ACNC: <2 MG/DL
VIT B12 SERPL-MCNC: 394 PG/ML (ref 180–914)
WBC # BLD AUTO: 7.05 THOUSAND/UL (ref 4.31–10.16)
WBC #/AREA URNS AUTO: ABNORMAL /HPF

## 2023-07-14 PROCEDURE — 82607 VITAMIN B-12: CPT

## 2023-07-14 PROCEDURE — 80061 LIPID PANEL: CPT

## 2023-07-14 PROCEDURE — 85027 COMPLETE CBC AUTOMATED: CPT

## 2023-07-14 PROCEDURE — 83550 IRON BINDING TEST: CPT

## 2023-07-14 PROCEDURE — 80053 COMPREHEN METABOLIC PANEL: CPT

## 2023-07-14 PROCEDURE — 36415 COLL VENOUS BLD VENIPUNCTURE: CPT

## 2023-07-14 PROCEDURE — 82306 VITAMIN D 25 HYDROXY: CPT

## 2023-07-14 PROCEDURE — 83540 ASSAY OF IRON: CPT

## 2023-07-14 PROCEDURE — 82728 ASSAY OF FERRITIN: CPT

## 2023-07-14 PROCEDURE — 84443 ASSAY THYROID STIM HORMONE: CPT

## 2023-07-14 PROCEDURE — 83036 HEMOGLOBIN GLYCOSYLATED A1C: CPT

## 2023-07-14 PROCEDURE — 81001 URINALYSIS AUTO W/SCOPE: CPT

## 2023-07-24 ENCOUNTER — TELEPHONE (OUTPATIENT)
Dept: FAMILY MEDICINE CLINIC | Facility: CLINIC | Age: 63
End: 2023-07-24

## 2023-07-24 ENCOUNTER — HOSPITAL ENCOUNTER (OUTPATIENT)
Dept: ULTRASOUND IMAGING | Facility: HOSPITAL | Age: 63
Discharge: HOME | End: 2023-07-24
Attending: FAMILY MEDICINE
Payer: MEDICARE

## 2023-07-24 DIAGNOSIS — Z79.899 OTHER LONG TERM (CURRENT) DRUG THERAPY: Primary | ICD-10-CM

## 2023-07-24 DIAGNOSIS — K76.0 FATTY LIVER: ICD-10-CM

## 2023-07-24 PROCEDURE — 76705 ECHO EXAM OF ABDOMEN: CPT

## 2023-07-24 RX ORDER — KETOCONAZOLE 20 MG/ML
1 SHAMPOO TOPICAL 2 TIMES WEEKLY
Qty: 100 ML | Refills: 0 | Status: SHIPPED | OUTPATIENT
Start: 2023-07-24

## 2023-07-24 NOTE — TELEPHONE ENCOUNTER
Not sure how to put the application in so it goes through?    Can you do on behalf of Dr Lexus Mcarthur

## 2023-07-24 NOTE — TELEPHONE ENCOUNTER
----- Message from Henri Maciel MD sent at 7/20/2023  5:52 AM EDT -----  Take vit D 5K daily for 3 months then 2K daily after that   You are a PreDiabetic: watch the white rice, white bread, white Potato, and pasta. Maybe try the brown /whole wheat versions, or just limit how much and how often. Also be careful of the juices and sodas, and of course the sweets.      Bad cholesterol is beter than prior   Just watch the red meat and dairy and fried foods please   Lytes liver and kidneys are good   Rest is fine

## 2023-07-29 DIAGNOSIS — K76.0 FATTY LIVER: Primary | ICD-10-CM

## 2023-07-31 RX ORDER — SILDENAFIL 100 MG/1
TABLET, FILM COATED ORAL
COMMUNITY
Start: 2023-07-11

## 2023-08-03 ENCOUNTER — TELEPHONE (OUTPATIENT)
Dept: FAMILY MEDICINE CLINIC | Facility: CLINIC | Age: 63
End: 2023-08-03

## 2023-08-03 NOTE — TELEPHONE ENCOUNTER
Pt called, he received a call from Hepatology. He has no clue why. I told him its for a fatty liver and he wants to know why does he need to go there. He was very confused and asked if he can just watch his diet?

## 2023-08-18 ENCOUNTER — OFFICE VISIT (OUTPATIENT)
Dept: GASTROENTEROLOGY | Facility: AMBULARY SURGERY CENTER | Age: 63
End: 2023-08-18
Payer: MEDICARE

## 2023-08-18 VITALS
HEIGHT: 69 IN | OXYGEN SATURATION: 94 % | SYSTOLIC BLOOD PRESSURE: 136 MMHG | WEIGHT: 265.4 LBS | DIASTOLIC BLOOD PRESSURE: 80 MMHG | BODY MASS INDEX: 39.31 KG/M2 | HEART RATE: 64 BPM

## 2023-08-18 DIAGNOSIS — K75.3 GRANULOMA OF LIVER: Primary | ICD-10-CM

## 2023-08-18 DIAGNOSIS — K76.0 FATTY LIVER: ICD-10-CM

## 2023-08-18 DIAGNOSIS — F10.21 HISTORY OF ALCOHOL DEPENDENCE (HCC): ICD-10-CM

## 2023-08-18 PROCEDURE — 99204 OFFICE O/P NEW MOD 45 MIN: CPT | Performed by: FAMILY MEDICINE

## 2023-08-18 NOTE — PROGRESS NOTES
[facility] - Outpatient Consultation  Josie Rodriguez 58 y.o. male MRN: [id number]  Encounter: [id number]          ASSESSMENT AND PLAN:      1. Fatty liver  2. History of alcohol dependence ([address] )  3. BMI 39.0-39.9,adult  Patient noted to have hepatomegaly and hepatic steatosis on imaging since 2022. Hepatic function historically within normal limits. Patient does report a history of excessive alcohol consumption but has since cut back and now with occasional consumption within the past few years. No clinical evidence of chronic liver disease. Suspect patient to have a combination of alcohol and nonalcoholic related fatty liver disease. Discussed the bsaic pathophysiology of fatty liver disease and the potential to progress to cirrhosis over time if left untreated. Ordered basic serologies in addition to serologies screening for viral hepatitis, evaluate for A1AT Pi* carrier status and assess patient's immunity status to hep A and B. In the absence of elevated liver enzymes, will hold off on additional serologic evaluation. Patient will also complete a TINEO Fibrosure and U/S elastography to help quantify steatosis and better assess for advanced fibrosis. Discussed recommendations in regards to the treatment of fatty liver, particularly including steady and sustainable weight loss of approx 10-15% of patient's current body weight over a 6-12 month period. Discussed referral to weight management but patient would prefer to attempt weight loss on his own. Also recommended strict control of contributing comorbidities and limiting alcohol consumption as well as not "stacking" his alcoholic beverages. NAFLD Fibrosis Score is: -.87    - Ambulatory Referral to Hepatology  - CBC and differential; Future  - Comprehensive metabolic panel; Future  - Protime-INR; Future  - Alpha 1 Antitrypsin Phenotype; Future  - Chronic Hepatitis Panel;  Future  - Hepatitis A antibody, total; Future  - Hepatitis B surface antibody; Future  - TINEO Fibrosure; Future  - US elastography/UGAP; Future    4. Granuloma of liver  Patient is noted to have scattered calcified granulomas on his liver from a CT renal protocol in 2022. Although unlikely, advised patient that provider would discuss with attending if this warrants additional imaging. 5. Screening for colon cancer  Patient is up-to-date with CRC screening. Cologuard (12/2022) negative. Patient will be due for repeat Cologuard 12/2023. Follow-up in 3 months or sooner if necessary. ______________________________________________________________________    HPI: Patient is a 58 y.o. male with PMH significant for elevated obesity, PSA, HLD and MOE who presents today for consultation regarding a fatty appearing liver noted on ultrasound. Patient is noted to have hepatomegaly and hepatic steatosis on a prior CT (8/2022) recently redemonstrated on an [address] [place] [address]. Hepatic function has been historically within normal limits. Patient has had instances of borderline low platelet count but with normal albumin levels. Denies a personal history of liver disease. Denies family history of liver disease or liver cancer. Denies a known past or current infection with viral hepatitis. In regards to his alcohol use, patient reports a history of heavy alcohol use drinking approximately 2 cases of beer weekly with occasional liquor x33 years. However, stated that he started to feel ill from even a small amount of alcohol and now drinks on occasion. Although he continues to stack his alcoholic beverages having multiple beverages in 1 sitting. Denies any liver specific complaints today. REVIEW OF SYSTEMS:    CONSTITUTIONAL: Denies any fever, chills, rigors, and weight loss. HEENT: No earache or tinnitus. Denies hearing loss or visual disturbances. CARDIOVASCULAR: No chest pain or palpitations.    RESPIRATORY: Denies any cough, hemoptysis, shortness of breath or dyspnea on exertion. GASTROINTESTINAL: As noted in the History of Present Illness. GENITOURINARY: No problems with urination. Denies any hematuria or dysuria. NEUROLOGIC: No dizziness or vertigo, denies headaches. MUSCULOSKELETAL: Denies any muscle or joint pain. SKIN: Denies skin rashes or itching. ENDOCRINE: Denies excessive thirst. Denies intolerance to heat or cold. PSYCHOSOCIAL: Denies depression or anxiety. Denies any recent memory loss. Historical Information   Past Medical History:   Diagnosis Date   • Enlarged prostate    • Daniels catheter in place    • Hyperlipidemia    • Sleep apnea     does not have a cpap machine   • Tinnitus    • Unable to empty bladder    • Wears reading eyeglasses      Past Surgical History:   Procedure Laterality Date   • APPENDECTOMY     • CLAVICLE FRACTURE REPAIR     • CYST REMOVAL     • HERNIA REPAIR     • MN BX PROSTATE STRTCTC SATURATION SAMPLING IMG GID N/A 2022    Procedure: TRANSPERINEALMRI FUSION BIOPSY PROSTATE;  Surgeon: Jadiel Sr MD;  Location: [facility];  Service: Urology   • MN LASER ENUCLEATION PROSTATE W/MORCELLATION N/A 2023    Procedure: LASER ENUCLEATION PROSTATE W MORCELLATION;  Surgeon: Buckley Habermann, MD;  Location: [facility];  Service: Urology   • TONSILLECTOMY     • TRANSURETHRAL RESECTION OF PROSTATE N/A 2023    Procedure: TRANSURETHRAL RESECTION OF PROSTATE (TURP);   Surgeon: Buckley Habermann, MD;  Location: [facility];  Service: Urology   • UVULECTOMY       Social History   Social History     Substance and Sexual Activity   Alcohol Use Yes    Comment: rarely     Social History     Substance and Sexual Activity   Drug Use Yes   • Types: Marijuana    Comment: rarely - once every 5-6 months     Social History     Tobacco Use   Smoking Status Former   • Types: Cigarettes   • Quit date:    • Years since quittin.6   Smokeless Tobacco Never     Family History   Problem Relation Age of Onset   • Dementia Mother • Prostate cancer Father        Meds/Allergies       Current Outpatient Medications:   •  ketoconazole (NIZORAL) 2 % shampoo  •  sildenafil (VIAGRA) 100 mg tablet  •  tadalafil (CIALIS) 20 MG tablet    Allergies   Allergen Reactions   • Other Anxiety     Ether. ...high anxiety   • Keflex [Cephalexin] Other (See Comments)     Unsure - wasn't effective many years ago           Objective     Blood pressure 136/80, pulse 64, height 5' 9" (1.753 m), weight 120 kg (265 lb 6.4 oz), SpO2 94 %. Body mass index is 39.19 kg/m². PHYSICAL EXAM:      General Appearance:   Alert, cooperative, no distress   HEENT:   Normocephalic, atraumatic, anicteric. Neck:  Supple, symmetrical, trachea midline   Lungs:   Clear to auscultation bilaterally; no rales, rhonchi or wheezing; respirations unlabored    Heart[de-identified]   Regular rate and rhythm; no murmur, rub, or gallop. Abdomen:   Soft, non-tender, non-distended; normal bowel sounds; no masses, no organomegaly    Genitalia:   Deferred    Rectal:   Deferred    Extremities:  No cyanosis, clubbing or edema    Pulses:  2+ and symmetric    Skin:  No jaundice, rashes, or lesions    Lymph nodes:  No palpable cervical lymphadenopathy        Lab Results:   No visits with results within 1 Day(s) from this visit.    Latest known visit with results is:   Appointment on 07/14/2023   Component Date Value   • TSH 3RD GENERATON 07/14/2023 2.828    • Vit D, 25-Hydroxy 07/14/2023 22.3 (L)    • Cholesterol 07/14/2023 194    • Triglycerides 07/14/2023 188 (H)    • HDL, Direct 07/14/2023 39 (L)    • LDL Calculated 07/14/2023 117 (H)    • Non-HDL-Chol (CHOL-HDL) 07/14/2023 155    • Sodium 07/14/2023 136    • Potassium 07/14/2023 4.5    • Chloride 07/14/2023 109 (H)    • CO2 07/14/2023 27    • ANION GAP 07/14/2023 0    • BUN 07/14/2023 16    • Creatinine 07/14/2023 1.07    • Glucose, Fasting 07/14/2023 95    • Calcium 07/14/2023 9.1    • AST 07/14/2023 22    • ALT 07/14/2023 50    • Alkaline Phosphatase 07/14/2023 65    • Total Protein 07/14/2023 8.1    • Albumin 07/14/2023 3.8    • Total Bilirubin 07/14/2023 0.54    • eGFR 07/14/2023 73    • WBC 07/14/2023 7.05    • RBC 07/14/2023 5.18    • Hemoglobin 07/14/2023 14.7    • Hematocrit 07/14/2023 45.4    • MCV 07/14/2023 88    • MCH 07/14/2023 28.4    • MCHC 07/14/2023 32.4    • RDW 07/14/2023 13.8    • Platelets 73/00/3257 181    • MPV 07/14/2023 10.9    • Vitamin B-12 07/14/2023 394    • Color, UA 07/14/2023 Yellow    • Clarity, UA 07/14/2023 Clear    • Specific Gravity, UA 07/14/2023 1.017    • pH, UA 07/14/2023 5.5    • Leukocytes, UA 07/14/2023 Negative    • Nitrite, UA 07/14/2023 Negative    • Protein, UA 07/14/2023 Trace (A)    • Glucose, UA 07/14/2023 Negative    • Ketones, UA 07/14/2023 Negative    • Urobilinogen, UA 07/14/2023 <2.0    • Bilirubin, UA 07/14/2023 Negative    • Occult Blood, UA 07/14/2023 Negative    • Hemoglobin A1C 07/14/2023 5.7 (H)    • EAG 07/14/2023 117    • Iron Saturation 07/14/2023 18 (L)    • TIBC 07/14/2023 465 (H)    • Iron 07/14/2023 85    • Ferritin 07/14/2023 60    • RBC, UA 07/14/2023 1-2    • WBC, UA 07/14/2023 1-2    • Epithelial Cells 07/14/2023 Occasional    • Bacteria, UA 07/14/2023 None Seen    • MUCUS THREADS 07/14/2023 Occasional (A)    • Hyaline Casts, UA 07/14/2023 3-5 (A)          Radiology Results:   US right upper quadrant    Result Date: 7/28/2023  Narrative: RIGHT UPPER QUADRANT ULTRASOUND INDICATION:     K76.0: Fatty (change of) liver, not elsewhere classified. COMPARISON: CT abdomen pelvis 8/24/2022. TECHNIQUE:   Real-time ultrasound of the right upper quadrant was performed with a curvilinear transducer with both volumetric sweeps and still imaging techniques. FINDINGS: PANCREAS:  Portions of the pancreas are obscured by bowel gas. Visualized portions of the pancreas are grossly unremarkable. AORTA AND IVC:  Visualized portions are normal for patient age. LIVER: Size: Enlarged.   The liver measures 18.0 cm in the midclavicular line. Contour:  Surface contour is smooth. Parenchyma: There is marked diffuse increased echogenicity with smooth echotexture and significant beam attenuation with loss of periportal echogenicity. Most consistent with severe hepatic steatosis. No liver mass identified. Limited imaging of the main portal vein shows it to be patent and hepatopetal. BILIARY: No gallbladder findings. No intrahepatic biliary dilatation. CBD measures 4.0 mm. No evidence of choledocholithiasis. KIDNEY: Right kidney measures 11.3 x 6.9 x 6.1 cm. Volume 250.2 mL Kidney within normal limits. ASCITES:   None. Impression: 1. Hepatomegaly with steatosis. Workstation performed: [id number]       Melrose Hammans, PA-C     **Please note: Dictation voice to text software may have been used in the creation of this record. Occasional wrong word or “sound alike” substitutions may have occurred due to the inherent limitations of voice recognition software. Read the chart carefully and recognize, using context, where substitutions have occurred. **

## 2023-08-21 ENCOUNTER — APPOINTMENT (OUTPATIENT)
Dept: LAB | Facility: CLINIC | Age: 63
End: 2023-08-21
Payer: MEDICARE

## 2023-08-21 ENCOUNTER — NURSE TRIAGE (OUTPATIENT)
Age: 63
End: 2023-08-21

## 2023-08-21 DIAGNOSIS — K76.0 FATTY LIVER: ICD-10-CM

## 2023-08-21 LAB
ALBUMIN SERPL BCP-MCNC: 4.2 G/DL (ref 3.5–5)
ALP SERPL-CCNC: 64 U/L (ref 46–116)
ALT SERPL W P-5'-P-CCNC: 41 U/L (ref 12–78)
ANION GAP SERPL CALCULATED.3IONS-SCNC: 7 MMOL/L
AST SERPL W P-5'-P-CCNC: 19 U/L (ref 5–45)
BASOPHILS # BLD AUTO: 0.07 THOUSANDS/ÂΜL (ref 0–0.1)
BASOPHILS NFR BLD AUTO: 1 % (ref 0–1)
BILIRUB SERPL-MCNC: 0.62 MG/DL (ref 0.2–1)
BUN SERPL-MCNC: 17 MG/DL (ref 5–25)
CALCIUM SERPL-MCNC: 9.8 MG/DL (ref 8.3–10.1)
CHLORIDE SERPL-SCNC: 107 MMOL/L (ref 96–108)
CO2 SERPL-SCNC: 23 MMOL/L (ref 21–32)
CREAT SERPL-MCNC: 1.12 MG/DL (ref 0.6–1.3)
EOSINOPHIL # BLD AUTO: 0.36 THOUSAND/ÂΜL (ref 0–0.61)
EOSINOPHIL NFR BLD AUTO: 6 % (ref 0–6)
ERYTHROCYTE [DISTWIDTH] IN BLOOD BY AUTOMATED COUNT: 13.5 % (ref 11.6–15.1)
GFR SERPL CREATININE-BSD FRML MDRD: 70 ML/MIN/1.73SQ M
GLUCOSE P FAST SERPL-MCNC: 103 MG/DL (ref 65–99)
HAV AB SER QL IA: NORMAL
HBV CORE AB SER QL: NORMAL
HBV CORE IGM SER QL: NORMAL
HBV SURFACE AB SER-ACNC: <3 MIU/ML
HBV SURFACE AG SER QL: NORMAL
HCT VFR BLD AUTO: 45.4 % (ref 36.5–49.3)
HCV AB SER QL: NORMAL
HGB BLD-MCNC: 14.9 G/DL (ref 12–17)
IMM GRANULOCYTES # BLD AUTO: 0.05 THOUSAND/UL (ref 0–0.2)
IMM GRANULOCYTES NFR BLD AUTO: 1 % (ref 0–2)
INR PPP: 0.98 (ref 0.84–1.19)
LYMPHOCYTES # BLD AUTO: 2.06 THOUSANDS/ÂΜL (ref 0.6–4.47)
LYMPHOCYTES NFR BLD AUTO: 31 % (ref 14–44)
MCH RBC QN AUTO: 28.3 PG (ref 26.8–34.3)
MCHC RBC AUTO-ENTMCNC: 32.8 G/DL (ref 31.4–37.4)
MCV RBC AUTO: 86 FL (ref 82–98)
MONOCYTES # BLD AUTO: 0.48 THOUSAND/ÂΜL (ref 0.17–1.22)
MONOCYTES NFR BLD AUTO: 7 % (ref 4–12)
NEUTROPHILS # BLD AUTO: 3.58 THOUSANDS/ÂΜL (ref 1.85–7.62)
NEUTS SEG NFR BLD AUTO: 54 % (ref 43–75)
NRBC BLD AUTO-RTO: 0 /100 WBCS
PLATELET # BLD AUTO: 220 THOUSANDS/UL (ref 149–390)
PMV BLD AUTO: 11.1 FL (ref 8.9–12.7)
POTASSIUM SERPL-SCNC: 4.4 MMOL/L (ref 3.5–5.3)
PROT SERPL-MCNC: 8.6 G/DL (ref 6.4–8.4)
PROTHROMBIN TIME: 13.2 SECONDS (ref 11.6–14.5)
RBC # BLD AUTO: 5.27 MILLION/UL (ref 3.88–5.62)
SODIUM SERPL-SCNC: 137 MMOL/L (ref 135–147)
WBC # BLD AUTO: 6.6 THOUSAND/UL (ref 4.31–10.16)

## 2023-08-21 PROCEDURE — 82247 BILIRUBIN TOTAL: CPT

## 2023-08-21 PROCEDURE — 80053 COMPREHEN METABOLIC PANEL: CPT

## 2023-08-21 PROCEDURE — 82947 ASSAY GLUCOSE BLOOD QUANT: CPT

## 2023-08-21 PROCEDURE — 84460 ALANINE AMINO (ALT) (SGPT): CPT

## 2023-08-21 PROCEDURE — 86706 HEP B SURFACE ANTIBODY: CPT

## 2023-08-21 PROCEDURE — 84478 ASSAY OF TRIGLYCERIDES: CPT

## 2023-08-21 PROCEDURE — 36415 COLL VENOUS BLD VENIPUNCTURE: CPT

## 2023-08-21 PROCEDURE — 84450 TRANSFERASE (AST) (SGOT): CPT

## 2023-08-21 PROCEDURE — 82103 ALPHA-1-ANTITRYPSIN TOTAL: CPT

## 2023-08-21 PROCEDURE — 86708 HEPATITIS A ANTIBODY: CPT

## 2023-08-21 PROCEDURE — 83010 ASSAY OF HAPTOGLOBIN QUANT: CPT

## 2023-08-21 PROCEDURE — 86704 HEP B CORE ANTIBODY TOTAL: CPT

## 2023-08-21 PROCEDURE — 82104 ALPHA-1-ANTITRYPSIN PHENO: CPT

## 2023-08-21 PROCEDURE — 86803 HEPATITIS C AB TEST: CPT

## 2023-08-21 PROCEDURE — 85610 PROTHROMBIN TIME: CPT

## 2023-08-21 PROCEDURE — 82977 ASSAY OF GGT: CPT

## 2023-08-21 PROCEDURE — 82465 ASSAY BLD/SERUM CHOLESTEROL: CPT

## 2023-08-21 PROCEDURE — 86705 HEP B CORE ANTIBODY IGM: CPT

## 2023-08-21 PROCEDURE — 87340 HEPATITIS B SURFACE AG IA: CPT

## 2023-08-21 PROCEDURE — 82172 ASSAY OF APOLIPOPROTEIN: CPT

## 2023-08-21 PROCEDURE — 85025 COMPLETE CBC W/AUTO DIFF WBC: CPT

## 2023-08-21 PROCEDURE — 83883 ASSAY NEPHELOMETRY NOT SPEC: CPT

## 2023-08-21 NOTE — TELEPHONE ENCOUNTER
Patient calling in, he is waiting to hear from SHALOM Gomez he was seen in office on 8/18. Patient will schedule US in the meantime.

## 2023-08-21 NOTE — TELEPHONE ENCOUNTER
Called patient to discuss the need for additional imaging. Patient was noted to have scattered calcified granulomas on a prior CT. After discussion with attending, this is considered a benign finding and does not require additional imaging. Patient gave verbal understanding and all questions were answered to his satisfaction.

## 2023-08-22 LAB
A2 MACROGLOB SERPL-MCNC: 321 MG/DL (ref 110–276)
ALT SERPL W P-5'-P-CCNC: 36 IU/L (ref 0–55)
APO A-I SERPL-MCNC: 109 MG/DL (ref 101–178)
AST SERPL W P-5'-P-CCNC: 22 IU/L (ref 0–40)
BILIRUB SERPL-MCNC: 0.5 MG/DL (ref 0–1.2)
CHOLEST SERPL-MCNC: 232 MG/DL (ref 100–199)
FIBROSIS SCORING:: ABNORMAL
FIBROSIS STAGE SERPL QL: ABNORMAL
GGT SERPL-CCNC: 16 IU/L (ref 0–65)
GLUCOSE SERPL-MCNC: 98 MG/DL (ref 70–99)
HAPTOGLOB SERPL-MCNC: 220 MG/DL (ref 32–363)
INTERPRETATION: ABNORMAL
LABORATORY COMMENT REPORT: ABNORMAL
LIVER FIBR SCORE SERPL CALC.FIBROSURE: 0.47 (ref 0–0.21)
NASH GRADE SERPL QL: ABNORMAL
NASH SCORE SERPL: 0.52 (ref 0–0.25)
REF LAB TEST METHOD: ABNORMAL
SL AMB NASH SCORING: ABNORMAL
SL AMB STEATOSIS GRADE: ABNORMAL
SL AMB STEATOSIS SCORE: 0.47 (ref 0–0.4)
STEATOSIS SCORING: ABNORMAL
TEST PERFORMANCE INFO SPEC: ABNORMAL
TRIGL SERPL-MCNC: 177 MG/DL (ref 0–149)

## 2023-08-23 ENCOUNTER — TELEPHONE (OUTPATIENT)
Age: 63
End: 2023-08-23

## 2023-08-23 NOTE — TELEPHONE ENCOUNTER
Patients GI provider:  Kim RAUSCH    Number to return call: [phone]    Reason for call: Thong Melendez from [facility] called to advise the [address] was approved and the approval code is [id number] valid from 9/1/23-12/1/23    Scheduled procedure/appointment date if applicable: Appt 6/96/44

## 2023-08-25 LAB
A1AT PHENOTYP SERPL IFE: NORMAL
A1AT SERPL-MCNC: 151 MG/DL (ref 101–187)

## 2023-09-01 ENCOUNTER — HOSPITAL ENCOUNTER (OUTPATIENT)
Dept: ULTRASOUND IMAGING | Facility: HOSPITAL | Age: 63
Discharge: HOME | End: 2023-09-01
Payer: MEDICARE

## 2023-09-01 DIAGNOSIS — K76.0 FATTY LIVER: ICD-10-CM

## 2023-09-01 PROCEDURE — 76981 USE PARENCHYMA: CPT

## 2023-11-20 ENCOUNTER — TELEPHONE (OUTPATIENT)
Age: 63
End: 2023-11-20

## 2023-11-20 NOTE — TELEPHONE ENCOUNTER
Patients GI provider:  SHALOM Cardenas    Number to return call: [phone]    Reason for call: Pt calling asking if SHALOM Hills wants him to do any testing before his 1/19 appt w/ her. Pt [facility] if there is anything, you may also call him.     Scheduled procedure/appointment date if applicable: Apt 2/12/85

## 2023-11-20 NOTE — TELEPHONE ENCOUNTER
Patient calling in requesting a new PSA order to be placed in chart for his January apt. Please place order and make patient aware.      CB: [phone]

## 2023-11-20 NOTE — TELEPHONE ENCOUNTER
Patient has appt on 1/16/24. He would like to know if he needs labs done prior to that visit. He uses [facility] at [facility].

## 2023-11-21 DIAGNOSIS — K76.0 FATTY LIVER: Primary | ICD-10-CM

## 2023-11-21 DIAGNOSIS — E61.1 IRON DEFICIENCY: ICD-10-CM

## 2023-11-21 DIAGNOSIS — Z79.899 OTHER LONG TERM (CURRENT) DRUG THERAPY: ICD-10-CM

## 2023-11-21 DIAGNOSIS — E53.8 B12 DEFICIENCY: ICD-10-CM

## 2023-11-21 DIAGNOSIS — E55.9 VITAMIN D DEFICIENCY: ICD-10-CM

## 2023-11-21 DIAGNOSIS — E78.2 MIXED HYPERLIPIDEMIA: ICD-10-CM

## 2023-11-21 DIAGNOSIS — Z12.5 SCREENING FOR PROSTATE CANCER: ICD-10-CM

## 2024-01-09 ENCOUNTER — APPOINTMENT (OUTPATIENT)
Dept: LAB | Facility: CLINIC | Age: 64
End: 2024-01-09
Payer: MEDICARE

## 2024-01-09 DIAGNOSIS — E53.8 B12 DEFICIENCY: ICD-10-CM

## 2024-01-09 DIAGNOSIS — E78.2 MIXED HYPERLIPIDEMIA: ICD-10-CM

## 2024-01-09 DIAGNOSIS — E61.1 IRON DEFICIENCY: ICD-10-CM

## 2024-01-09 DIAGNOSIS — Z79.899 OTHER LONG TERM (CURRENT) DRUG THERAPY: ICD-10-CM

## 2024-01-09 DIAGNOSIS — K76.0 FATTY LIVER: ICD-10-CM

## 2024-01-09 DIAGNOSIS — E55.9 VITAMIN D DEFICIENCY: ICD-10-CM

## 2024-01-09 DIAGNOSIS — Z12.5 SCREENING FOR PROSTATE CANCER: ICD-10-CM

## 2024-01-09 LAB
25(OH)D3 SERPL-MCNC: 25.4 NG/ML (ref 30–100)
ALBUMIN SERPL BCP-MCNC: 4.7 G/DL (ref 3.5–5)
ALP SERPL-CCNC: 62 U/L (ref 34–104)
ALT SERPL W P-5'-P-CCNC: 22 U/L (ref 7–52)
ANION GAP SERPL CALCULATED.3IONS-SCNC: 10 MMOL/L
AST SERPL W P-5'-P-CCNC: 20 U/L (ref 13–39)
BACTERIA UR QL AUTO: ABNORMAL /HPF
BASOPHILS # BLD AUTO: 0.05 THOUSANDS/ÂΜL (ref 0–0.1)
BASOPHILS NFR BLD AUTO: 1 % (ref 0–1)
BILIRUB SERPL-MCNC: 0.82 MG/DL (ref 0.2–1)
BILIRUB UR QL STRIP: NEGATIVE
BUN SERPL-MCNC: 22 MG/DL (ref 5–25)
CALCIUM SERPL-MCNC: 9.8 MG/DL (ref 8.4–10.2)
CHLORIDE SERPL-SCNC: 103 MMOL/L (ref 96–108)
CHOLEST SERPL-MCNC: 195 MG/DL
CLARITY UR: ABNORMAL
CO2 SERPL-SCNC: 26 MMOL/L (ref 21–32)
COLOR UR: ABNORMAL
CREAT SERPL-MCNC: 0.83 MG/DL (ref 0.6–1.3)
EOSINOPHIL # BLD AUTO: 0.3 THOUSAND/ÂΜL (ref 0–0.61)
EOSINOPHIL NFR BLD AUTO: 4 % (ref 0–6)
ERYTHROCYTE [DISTWIDTH] IN BLOOD BY AUTOMATED COUNT: 13.2 % (ref 11.6–15.1)
EST. AVERAGE GLUCOSE BLD GHB EST-MCNC: 105 MG/DL
FERRITIN SERPL-MCNC: 120 NG/ML (ref 24–336)
GFR SERPL CREATININE-BSD FRML MDRD: 93 ML/MIN/1.73SQ M
GLUCOSE P FAST SERPL-MCNC: 98 MG/DL (ref 65–99)
GLUCOSE UR STRIP-MCNC: NEGATIVE MG/DL
HBA1C MFR BLD: 5.3 %
HCT VFR BLD AUTO: 43.2 % (ref 36.5–49.3)
HDLC SERPL-MCNC: 39 MG/DL
HGB BLD-MCNC: 13.7 G/DL (ref 12–17)
HGB UR QL STRIP.AUTO: NEGATIVE
IMM GRANULOCYTES # BLD AUTO: 0.03 THOUSAND/UL (ref 0–0.2)
IMM GRANULOCYTES NFR BLD AUTO: 0 % (ref 0–2)
IRON SATN MFR SERPL: 19 % (ref 15–50)
IRON SERPL-MCNC: 71 UG/DL (ref 50–212)
KETONES UR STRIP-MCNC: NEGATIVE MG/DL
LDLC SERPL CALC-MCNC: 129 MG/DL (ref 0–100)
LEUKOCYTE ESTERASE UR QL STRIP: ABNORMAL
LYMPHOCYTES # BLD AUTO: 2.18 THOUSANDS/ÂΜL (ref 0.6–4.47)
LYMPHOCYTES NFR BLD AUTO: 30 % (ref 14–44)
MCH RBC QN AUTO: 28.8 PG (ref 26.8–34.3)
MCHC RBC AUTO-ENTMCNC: 31.7 G/DL (ref 31.4–37.4)
MCV RBC AUTO: 91 FL (ref 82–98)
MONOCYTES # BLD AUTO: 0.66 THOUSAND/ÂΜL (ref 0.17–1.22)
MONOCYTES NFR BLD AUTO: 9 % (ref 4–12)
MUCOUS THREADS UR QL AUTO: ABNORMAL
NEUTROPHILS # BLD AUTO: 3.98 THOUSANDS/ÂΜL (ref 1.85–7.62)
NEUTS SEG NFR BLD AUTO: 56 % (ref 43–75)
NITRITE UR QL STRIP: NEGATIVE
NON-SQ EPI CELLS URNS QL MICRO: ABNORMAL /HPF
NRBC BLD AUTO-RTO: 0 /100 WBCS
PH UR STRIP.AUTO: 5.5 [PH]
PLATELET # BLD AUTO: 216 THOUSANDS/UL (ref 149–390)
PMV BLD AUTO: 11 FL (ref 8.9–12.7)
POTASSIUM SERPL-SCNC: 4.3 MMOL/L (ref 3.5–5.3)
PROT SERPL-MCNC: 8 G/DL (ref 6.4–8.4)
PROT UR STRIP-MCNC: ABNORMAL MG/DL
PSA SERPL-MCNC: 0.62 NG/ML (ref 0–4)
RBC # BLD AUTO: 4.76 MILLION/UL (ref 3.88–5.62)
RBC #/AREA URNS AUTO: ABNORMAL /HPF
SODIUM SERPL-SCNC: 139 MMOL/L (ref 135–147)
SP GR UR STRIP.AUTO: 1.02 (ref 1–1.03)
TIBC SERPL-MCNC: 365 UG/DL (ref 250–450)
TRIGL SERPL-MCNC: 136 MG/DL
UIBC SERPL-MCNC: 294 UG/DL (ref 155–355)
UROBILINOGEN UR STRIP-ACNC: <2 MG/DL
WBC # BLD AUTO: 7.2 THOUSAND/UL (ref 4.31–10.16)
WBC #/AREA URNS AUTO: ABNORMAL /HPF

## 2024-01-09 PROCEDURE — G0103 PSA SCREENING: HCPCS

## 2024-01-09 PROCEDURE — 83540 ASSAY OF IRON: CPT

## 2024-01-09 PROCEDURE — 80053 COMPREHEN METABOLIC PANEL: CPT

## 2024-01-09 PROCEDURE — 83550 IRON BINDING TEST: CPT

## 2024-01-09 PROCEDURE — 36415 COLL VENOUS BLD VENIPUNCTURE: CPT

## 2024-01-09 PROCEDURE — 82728 ASSAY OF FERRITIN: CPT

## 2024-01-09 PROCEDURE — 82306 VITAMIN D 25 HYDROXY: CPT

## 2024-01-09 PROCEDURE — 81001 URINALYSIS AUTO W/SCOPE: CPT

## 2024-01-09 PROCEDURE — 80061 LIPID PANEL: CPT

## 2024-01-09 PROCEDURE — 85025 COMPLETE CBC W/AUTO DIFF WBC: CPT

## 2024-01-09 PROCEDURE — 83036 HEMOGLOBIN GLYCOSYLATED A1C: CPT

## 2024-01-16 ENCOUNTER — OFFICE VISIT (OUTPATIENT)
Dept: UROLOGY | Facility: CLINIC | Age: 64
End: 2024-01-16
Payer: MEDICARE

## 2024-01-16 VITALS
BODY MASS INDEX: 36.43 KG/M2 | HEIGHT: 69 IN | WEIGHT: 246 LBS | DIASTOLIC BLOOD PRESSURE: 80 MMHG | SYSTOLIC BLOOD PRESSURE: 136 MMHG

## 2024-01-16 DIAGNOSIS — R33.9 URINARY RETENTION: Primary | ICD-10-CM

## 2024-01-16 DIAGNOSIS — N52.8 OTHER MALE ERECTILE DYSFUNCTION: ICD-10-CM

## 2024-01-16 LAB — POST-VOID RESIDUAL VOLUME, ML POC: 29 ML

## 2024-01-16 PROCEDURE — 99213 OFFICE O/P EST LOW 20 MIN: CPT | Performed by: PHYSICIAN ASSISTANT

## 2024-01-16 PROCEDURE — 51798 US URINE CAPACITY MEASURE: CPT | Performed by: PHYSICIAN ASSISTANT

## 2024-01-16 RX ORDER — TADALAFIL 20 MG/1
20 TABLET ORAL DAILY PRN
Qty: 30 TABLET | Refills: 3 | Status: SHIPPED | OUTPATIENT
Start: 2024-01-16

## 2024-01-16 RX ORDER — SILDENAFIL 100 MG/1
100 TABLET, FILM COATED ORAL DAILY PRN
Qty: 30 TABLET | Refills: 3 | Status: SHIPPED | OUTPATIENT
Start: 2024-01-16

## 2024-01-16 NOTE — PROGRESS NOTES
"  UROLOGY PROGRESS NOTE   Patient Identifiers: Jay Goldberg (MRN [id number])  Date of Service: 1/16/2024    Subjective:   63-year-old man history of catheter dependent urinary retention.  He had a HoLEP procedure last February.  52 g of benign prostate tissue removed PSA 0.62 previously was over 5.  Still has some urinary frequency.  No leakage no hematuria.    Reason for visit: BPH/HoLEP follow-up    Objective:     VITALS:    Vitals:    01/16/24 1249   BP: 136/80           LABS:  Lab Results   Component Value Date    HGB 13.7 01/09/2024    HCT 43.2 01/09/2024    WBC 7.20 01/09/2024     01/09/2024   ]    Lab Results   Component Value Date    K 4.3 01/09/2024     01/09/2024    CO2 26 01/09/2024    BUN 22 01/09/2024    CREATININE 0.83 01/09/2024    CALCIUM 9.8 01/09/2024   ]        INPATIENT MEDS:    Current Outpatient Medications:     ketoconazole (NIZORAL) 2 % shampoo, Apply 1 Application topically 2 (two) times a week, Disp: 100 mL, Rfl: 0    sildenafil (VIAGRA) 100 mg tablet, , Disp: , Rfl:     tadalafil (CIALIS) 20 MG tablet, Take 1 tablet (20 mg total) by mouth daily as needed for erectile dysfunction (Patient not taking: Reported on 8/18/2023), Disp: 30 tablet, Rfl: 3      Physical Exam:   /80 (BP Location: Left arm, Patient Position: Sitting, Cuff Size: Adult)   Ht 5' 9\" (1.753 m)   Wt 112 kg (246 lb)   BMI 36.33 kg/m²   GEN: no acute distress    RESP: breathing comfortably with no accessory muscle use    ABD: soft, non-tender, non-distended   INCISION:    EXT: no significant peripheral edema       RADIOLOGY:   none     Assessment:   #1.  BPH/HoLEP  #2.  Erectile dysfunction    Plan:   -Follow-up in 6 months recheck PVR and symptom  -  -  -          "

## 2024-01-17 ENCOUNTER — APPOINTMENT (OUTPATIENT)
Dept: RADIOLOGY | Facility: CLINIC | Age: 64
End: 2024-01-17
Payer: MEDICARE

## 2024-01-17 ENCOUNTER — OFFICE VISIT (OUTPATIENT)
Dept: FAMILY MEDICINE CLINIC | Facility: CLINIC | Age: 64
End: 2024-01-17
Payer: MEDICARE

## 2024-01-17 VITALS
BODY MASS INDEX: 36.43 KG/M2 | DIASTOLIC BLOOD PRESSURE: 78 MMHG | SYSTOLIC BLOOD PRESSURE: 138 MMHG | HEIGHT: 69 IN | HEART RATE: 75 BPM | RESPIRATION RATE: 16 BRPM | WEIGHT: 246 LBS | OXYGEN SATURATION: 95 %

## 2024-01-17 DIAGNOSIS — E55.9 VITAMIN D DEFICIENCY: ICD-10-CM

## 2024-01-17 DIAGNOSIS — M79.671 PAIN OF RIGHT HEEL: ICD-10-CM

## 2024-01-17 DIAGNOSIS — K76.0 FATTY LIVER: ICD-10-CM

## 2024-01-17 DIAGNOSIS — H93.13 TINNITUS OF BOTH EARS: ICD-10-CM

## 2024-01-17 DIAGNOSIS — E78.2 MIXED HYPERLIPIDEMIA: ICD-10-CM

## 2024-01-17 DIAGNOSIS — E66.01 CLASS 2 SEVERE OBESITY DUE TO EXCESS CALORIES WITH SERIOUS COMORBIDITY AND BODY MASS INDEX (BMI) OF 36.0 TO 36.9 IN ADULT: ICD-10-CM

## 2024-01-17 DIAGNOSIS — Z12.11 SCREENING FOR COLON CANCER: Primary | ICD-10-CM

## 2024-01-17 PROCEDURE — 73650 X-RAY EXAM OF HEEL: CPT

## 2024-01-17 PROCEDURE — 99214 OFFICE O/P EST MOD 30 MIN: CPT | Performed by: FAMILY MEDICINE

## 2024-01-17 NOTE — PROGRESS NOTES
Assessment/Plan:  1. Screening for colon cancer  -     Cologuard    2. Mixed hyperlipidemia  -     Lipid Panel with Direct LDL reflex; Future; Expected date: 04/17/2024    3. Vitamin D deficiency  -     Vitamin D 25 hydroxy; Future; Expected date: 04/17/2024    4. Pain of right heel  -     XR heel / calcaneus 2+ vw right; Future; Expected date: 01/17/2024    5. Class 2 severe obesity due to excess calories with serious comorbidity and body mass index (BMI) of 36.0 to 36.9 in adult     6. Tinnitus of both ears  -     Ambulatory Referral to Otolaryngology; Future    7. Fatty liver    Fatty liver.  He was encouraged to continue weight loss management and abstain from alcohol intake.    Vitamin D deficiency.  His vitamin D is still on the low side.   I advised to take vitamin D3 5000 international unit daily.    Right heel pain.  He probably has Achilles tendinitis.  I recommended to use over-the-counter Voltaren gel every night.  I will get an x-ray of his right heel.    Tinnitus.  He was informed that tinnitus is more of a neurologic-based issue, but we do not have any treatment for it.  I instructed to try over-the-counter Lipo-Flavonoid.    6. Colon cancer screening.  I will place an order for Cologuard.    Follow-up  The patient will follow up in 6 months.    Subjective:      Patient ID: Jay M Goldberg is a 63 y.o. male.   who presents into the clinic for a 6-month follow-up visit.     He consents to the use of FUAD services.     Renal issues.   The patient reports fasting for 15 hours prior to undergoing a urine test.    He observed that his urine was dark in color, which he suspects may have influenced the test results.    He reports that his urine test revealed moderate leukocytes and trace protein levels, with additional turbidity noted.    He also mentioned that his urologist performed a postvoid residual screening, which yielded normal results.      Infection issues.   He states he was hospitalized for 4 to 5  days due to sepsis.    He reported experiencing unfamiliar symptoms of tremors lasting for 3 hours and expressed a feeling of being near death.      Gastrointestinal concerns.   He reports that there is a plan for repeat ultrasound due to hepatic steatosis.    He was recommended alcohol abstinence and weight management.    He reported weight loss of 22 pounds, with a recent weight of 246 pounds recorded at the urologist's office.    He notes that his weight was 259 or 258 pounds 4 months ago.    He adds that dietary modifications have been implemented following consultation with a hepatologist.    He states that his liver function tests show excellent results, correlating with continued weight loss.    He reports that he reduced his intake of dairy products like milk and cheese, and eliminated meat from diet, but has increased fish consumption.   He is making efforts to replace unhealthy fats with healthier alternatives.   He is seeking a recommendation for a Cologuard test.    He had a colonoscopy before but is uncertain if he has polyps in his colon.      Heel issues.  He states that approximately 5 months ago, he accidentally struck his right heel against a metal post.    He reports no current pain, but limping occurs after prolonged walking.    He reports increased swelling and discomfort are noted with extensive ambulation, accompanied by a slight enlargement of the existing bump.    He utilizes an oriental pillow for support.      Respiratory issues.   He denies experiencing chest pain or palpitations.    He reports that his oxygen saturation levels were 95% during his hospital stay and no indications of sleep apnea observed.    He describes experiencing shortness of breath when lying down with hands raised, following a brief period of breath-holding.    He notes that this also occurs during television viewing, after which he feels the need to take 3 to 4 deep breaths.   He also mentions an ability to hold his  breath underwater for extended periods, surpassing his teenage children.      ENT issues.   He presents with tinnitus and progressive hearing loss.  He plans to schedule an appointment with an otolaryngologist (ENT) in 6 months, noting that it has been a significant amount of time since his last ENT consultation.    He had a history of a gluteal duct cyst measuring 6.5 inches being excised.   He reports a past incident of thyroid perforation and sublingual involvement, which he initially mistook for a goiter.   He describes the size of the perforation as comparable to a ping-pong ball, which had protruded and obstructed his airway.      Wellness.   He has not been taking vitamin D.   He requests a prescription for his Cialis and Viagra supplements.     The following portions of the patient's history were reviewed and updated as appropriate: allergies, current medications, past family history, past medical history, past social history, past surgical history and problem list.    Review of Systems   Constitutional: Negative for activity change, appetite change and fever.  HENT: Ears: Positive for tinnitus and hearing loss. Negative for congestion, nosebleeds and trouble swallowing.    Eyes: Negative for itching.  Respiratory: Negative for cough and chest tightness.  Cardiovascular: Negative for chest pain and palpitations.  Gastrointestinal: Negative for abdominal pain, constipation, diarrhea, and nausea.  Endocrine: Negative for cold intolerance.  Genitourinary: Negative for frequency.  Musculoskeletal: Negative for gait problem and joint swelling.  Skin: Negative for rash.  Allergic/Immunologic: Negative for immunocompromised state.  Neurological: Negative for dizziness, tremors, seizures, syncope, and headaches.  Psychiatric/Behavioral: Negative for hallucinations and suicidal ideas.        Objective:     /78 (BP Location: Left arm, Patient Position: Sitting, Cuff Size: Large)   Pulse 75   Resp 16   Ht 5'  "9\" (1.753 m)   Wt 112 kg (246 lb)   SpO2 95%   BMI 36.33 kg/m²    Physical Exam  Vitals and nursing note reviewed.  Constitutional:     General: Not in acute distress.     Appearance: Well-developed.  HENT:     Head: Ears: Positive for tinnitus and hearing loss. Normocephalic and atraumatic.  Cardiovascular:     Rate and Rhythm: Normal rate and regular rhythm.     Heart sounds: Normal heart sounds. No murmur heard.  Pulmonary:     Effort: Pulmonary effort is normal.     Breath sounds: Normal breath sounds. No wheezing or rales.  Abdominal:     General: Bowel sounds are normal. There is no distension.     Palpations: Abdomen is soft.     Tenderness: There is no abdominal tenderness. There is no guarding or rebound.  Musculoskeletal:     General: No tenderness. Normal range of motion.     Cervical back: Normal range of motion and neck supple.  Lymphadenopathy:     Cervical: No cervical adenopathy.  Skin:     General: Skin is warm and dry.     Capillary Refill: Capillary refill takes less than 2 seconds.     Findings: No rash.  Neurological:     Mental Status: Alert and oriented to person, place, and time.     Cranial Nerves: No cranial nerve deficit.     Sensory: No sensory deficit.     Motor: No abnormal muscle tone.  Psychiatric:     Behavior: Behavior normal.     Thought Content: Thought content normal.     Judgment: Judgment normal.      Office Visit on 01/16/2024   Component Date Value   • POST-VOID RESIDUAL VOLUM* 01/16/2024 29    Appointment on 01/09/2024   Component Date Value   • Hemoglobin A1C 01/09/2024 5.3    • EAG 01/09/2024 105    • Color, UA 01/09/2024 Orange    • Clarity, UA 01/09/2024 Extra Turbid    • Specific Gravity, UA 01/09/2024 1.023    • pH, UA 01/09/2024 5.5    • Leukocytes, UA 01/09/2024 Moderate (A)    • Nitrite, UA 01/09/2024 Negative    • Protein, UA 01/09/2024 Trace (A)    • Glucose, UA 01/09/2024 Negative    • Ketones, UA 01/09/2024 Negative    • Urobilinogen, UA 01/09/2024 <2.0    • " Bilirubin, UA 01/09/2024 Negative    • Occult Blood, UA 01/09/2024 Negative    • Cholesterol 01/09/2024 195    • Triglycerides 01/09/2024 136    • HDL, Direct 01/09/2024 39 (L)    • LDL Calculated 01/09/2024 129 (H)    • Vit D, 25-Hydroxy 01/09/2024 25.4 (L)    • WBC 01/09/2024 7.20    • RBC 01/09/2024 4.76    • Hemoglobin 01/09/2024 13.7    • Hematocrit 01/09/2024 43.2    • MCV 01/09/2024 91    • MCH 01/09/2024 28.8    • MCHC 01/09/2024 31.7    • RDW 01/09/2024 13.2    • MPV 01/09/2024 11.0    • Platelets 01/09/2024 216    • nRBC 01/09/2024 0    • Neutrophils Relative 01/09/2024 56    • Immat GRANS % 01/09/2024 0    • Lymphocytes Relative 01/09/2024 30    • Monocytes Relative 01/09/2024 9    • Eosinophils Relative 01/09/2024 4    • Basophils Relative 01/09/2024 1    • Neutrophils Absolute 01/09/2024 3.98    • Immature Grans Absolute 01/09/2024 0.03    • Lymphocytes Absolute 01/09/2024 2.18    • Monocytes Absolute 01/09/2024 0.66    • Eosinophils Absolute 01/09/2024 0.30    • Basophils Absolute 01/09/2024 0.05    • Sodium 01/09/2024 139    • Potassium 01/09/2024 4.3    • Chloride 01/09/2024 103    • CO2 01/09/2024 26    • ANION GAP 01/09/2024 10    • BUN 01/09/2024 22    • Creatinine 01/09/2024 0.83    • Glucose, Fasting 01/09/2024 98    • Calcium 01/09/2024 9.8    • AST 01/09/2024 20    • ALT 01/09/2024 22    • Alkaline Phosphatase 01/09/2024 62    • Total Protein 01/09/2024 8.0    • Albumin 01/09/2024 4.7    • Total Bilirubin 01/09/2024 0.82    • eGFR 01/09/2024 93    • PSA 01/09/2024 0.62    • Iron Saturation 01/09/2024 19    • TIBC 01/09/2024 365    • Iron 01/09/2024 71    • UIBC 01/09/2024 294    • Ferritin 01/09/2024 120    • RBC, UA 01/09/2024 2-4 (A)    • WBC, UA 01/09/2024 2-4 (A)    • Epithelial Cells 01/09/2024 Occasional    • Bacteria, UA 01/09/2024 None Seen    • MUCUS THREADS 01/09/2024 Moderate (A)      I have personally reviewed results with the patient.      His sodium and potassium levels are  normal.   His WBC and RBC range are within normal limits.  His total cholesterol is at 195 mg/dL, triglyceride of 136 mg/dL, and LDL of 129 mg/dL.  His vitamin blood level is 25.4 ng/mL and PSA of 0.62 ng/mL.  His iron level is normal.  His urine analysis has traces of protein and leukocytes.    Levon Parham MD   [facility]     Transcribed for Levon Parham MD, by Radha Goode on 01/19/24 at 5:24 AM. Powered by Dragon [employer].

## 2024-01-19 ENCOUNTER — OFFICE VISIT (OUTPATIENT)
Dept: GASTROENTEROLOGY | Facility: AMBULARY SURGERY CENTER | Age: 64
End: 2024-01-19
Payer: MEDICARE

## 2024-01-19 ENCOUNTER — TELEPHONE (OUTPATIENT)
Dept: FAMILY MEDICINE CLINIC | Facility: CLINIC | Age: 64
End: 2024-01-19

## 2024-01-19 VITALS
BODY MASS INDEX: 37.03 KG/M2 | OXYGEN SATURATION: 98 % | HEIGHT: 69 IN | HEART RATE: 64 BPM | DIASTOLIC BLOOD PRESSURE: 78 MMHG | SYSTOLIC BLOOD PRESSURE: 152 MMHG | WEIGHT: 250 LBS

## 2024-01-19 DIAGNOSIS — K76.0 FATTY LIVER: Primary | ICD-10-CM

## 2024-01-19 DIAGNOSIS — F10.21 HISTORY OF ALCOHOL DEPENDENCE (HCC): ICD-10-CM

## 2024-01-19 DIAGNOSIS — Z12.11 SCREENING FOR COLON CANCER: ICD-10-CM

## 2024-01-19 PROCEDURE — 99213 OFFICE O/P EST LOW 20 MIN: CPT | Performed by: FAMILY MEDICINE

## 2024-01-19 NOTE — TELEPHONE ENCOUNTER
----- Message from Levon Parham MD sent at 1/19/2024  3:38 PM EST -----  Maybe follow up with podiatry   Call [facility] for eval     Use the cream that I said before   This is not fractured it is more of a tendonitis

## 2024-01-19 NOTE — PROGRESS NOTES
[facility] - Outpatient Follow-up Note  Jay M Goldberg 63 y.o. male MRN: [id number]  Encounter: [id number]          ASSESSMENT AND PLAN:      1. Fatty liver  2. History of alcohol dependence (HCC)  3. BMI 36.0-36.9,adult  Patient with hepatomegaly and hepatic steatosis on imaging since 2022 with historically normal liver function tests. Also reports a history of excessive alcohol consumption but with rare alcohol use within the last few years. Serologies negative for viral hepatitis and with a normal A1AT phenotype (MM). [place] FibroSure with F1-F2 fibrosis and U/S elastography was nondiagnostic (IQR/median >30%).    Fortunately, patient has lost approximately 15 lbs since his last appointment through diet alone which has resulted in improved cholesterol and normalization of his hemoglobin A1c. Congratulated him and encouraged him to continue with his efforts towards steady and sustainable weight loss. He is also aware of the importance of optimization of his metabolic risk factors and limiting his alcohol consumption.    Recommended repeating a US elastography and hopefully obtain a quality study to accurately stage hepatic fibrosis. If his elastography is without advanced hepatic fibrosis, he may return to the care of his primary care provider and would recommended he have his hepatic function monitored q6-12 months with routine labs and repeating an U/S elastography q2-3 years, or as clinically indicated, to monitor for the development of fibrosis. If he is seen to have advanced hepatic fibrosis then he will require a follow-up appointment likely in 6 months time.     - US elastography/UGAP; Future    4. Screening for colon cancer  Patient is up-to-date with CRC screening. Cologuard (12/2020) negative. He has been ordered for a repeat Cologuard as per his primary care provider.     Follow-up PRN pending the results of his repeat U/S elastography.      ______________________________________________________________________    SUBJECTIVE: Patient is a 63 y.o. male with PMH significant for elevated obesity, PSA, HLD and MOE who presents today for follow-up regarding fatty liver disease.     Interval history  - Serologies negative for viral hepatitis and with normal A1 AT phenotype (MM).   - [place] FibroSure with F1-F2 fibrosis and US elastography was nondiagnostic (IQR/median >30%).  - Patient has lost approximately 15 pounds since his last office appointment through diet with improved cholesterol and normalization of his hemoglobin A1c.    Extended liver history  Patient is noted to have hepatomegaly and hepatic steatosis on a prior CT (8/2022) recently redemonstrated on an RUQ US. Hepatic function has been historically within normal limits. Patient has had instances of borderline low platelet count but with normal albumin levels.     Denies a personal history of liver disease. Denies family history of liver disease or liver-related cancer. Denies a known past or current infection with viral hepatitis.     In regards to his alcohol use, patient reports a history of heavy alcohol use drinking approximately 2 cases of beer weekly with occasional liquor x33 years. However, stated that he started to feel ill from even a small amount of alcohol and now only drinks on occasion although he continues to stack his alcoholic beverages having multiple beverages in 1 sitting.       REVIEW OF SYSTEMS IS OTHERWISE NEGATIVE.      Historical Information   Past Medical History:   Diagnosis Date   • Enlarged prostate    • Daniels catheter in place    • Hyperlipidemia    • Sleep apnea     does not have a cpap machine   • Tinnitus    • Unable to empty bladder    • Wears reading eyeglasses      Past Surgical History:   Procedure Laterality Date   • APPENDECTOMY     • CLAVICLE FRACTURE REPAIR     • CYST REMOVAL     • HERNIA REPAIR     • KS BX PROSTATE STRTCTC SATURATION SAMPLING IMG GID  "N/A 2022    Procedure: TRANSPERINEALMRI FUSION BIOPSY PROSTATE;  Surgeon: Heber Espinoza MD;  Location: [facility];  Service: Urology   • KS LASER ENUCLEATION PROSTATE W/MORCELLATION N/A 2023    Procedure: LASER ENUCLEATION PROSTATE W MORCELLATION;  Surgeon: Chad Andersen MD;  Location: [facility];  Service: Urology   • TONSILLECTOMY     • TRANSURETHRAL RESECTION OF PROSTATE N/A 2023    Procedure: TRANSURETHRAL RESECTION OF PROSTATE (TURP);  Surgeon: Chad Andersen MD;  Location: [facility];  Service: Urology   • UVULECTOMY       Social History   Social History     Substance and Sexual Activity   Alcohol Use Yes    Comment: rarely     Social History     Substance and Sexual Activity   Drug Use Yes   • Types: Marijuana    Comment: rarely - once every 5-6 months     Social History     Tobacco Use   Smoking Status Former   • Current packs/day: 0.00   • Types: Cigarettes   • Quit date:    • Years since quittin.0   Smokeless Tobacco Never     Family History   Problem Relation Age of Onset   • Dementia Mother    • Prostate cancer Father        Meds/Allergies       Current Outpatient Medications:   •  ketoconazole (NIZORAL) 2 % shampoo  •  sildenafil (VIAGRA) 100 mg tablet  •  tadalafil (CIALIS) 20 MG tablet    Allergies   Allergen Reactions   • Other Anxiety     Ether....high anxiety   • Keflex [Cephalexin] Other (See Comments)     Unsure - wasn't effective many years ago           Objective     Blood pressure 152/78, pulse 64, height 5' 9\" (1.753 m), weight 113 kg (250 lb), SpO2 98%. Body mass index is 36.92 kg/m².      PHYSICAL EXAM:      General Appearance:   Alert, cooperative, no distress   HEENT:   Normocephalic, atraumatic, anicteric.     Neck:  Supple, symmetrical, trachea midline   Lungs:   Clear to auscultation bilaterally; no rales, rhonchi or wheezing; respirations unlabored    Heart::   Regular rate and rhythm; no murmur, rub, or gallop.   Abdomen:   Soft, non-tender, " non-distended; normal bowel sounds; no masses, no organomegaly    Genitalia:   Deferred    Rectal:   Deferred    Extremities:  No cyanosis, clubbing or edema    Pulses:  2+ and symmetric    Skin:  No jaundice, rashes, or lesions    Lymph nodes:  No palpable cervical lymphadenopathy        Lab Results:   No visits with results within 1 Day(s) from this visit.   Latest known visit with results is:   Office Visit on 01/16/2024   Component Date Value   • POST-VOID RESIDUAL VOLUM* 01/16/2024 29          Radiology Results:   XR heel / calcaneus 2+ vw right    Result Date: 1/17/2024  Narrative: RIGHT HEEL INDICATION:   Pain in right foot. COMPARISON:  There are no previous examinations available for comparison. VIEWS:  XR HEEL / CALCANEUS 2+ VW RIGHT Images: 3 FINDINGS: There is no acute fracture or dislocation. No significant degenerative changes. No lytic or blastic osseous lesion. Multiple soft tissue ossifications along the course of the plantar aponeurosis along with a small plantar calcaneal spur. There is a moderate-sized Achilles enthesophyte..     Impression: Ossifications along the plantar aponeurosis with a plantar calcaneal spur. Underlying remote injury to the plantar fascia or planter fasciitis in the differential. Moderate-sized Achilles enthesophyte.. Electronically signed: 01/17/2024 10:49 AM MD Delfina Pizarro PA-C     **Please note: Dictation voice to text software may have been used in the creation of this record. Occasional wrong word or “sound alike” substitutions may have occurred due to the inherent limitations of voice recognition software. Read the chart carefully and recognize, using context, where substitutions have occurred.**

## 2024-01-23 ENCOUNTER — TELEPHONE (OUTPATIENT)
Age: 64
End: 2024-01-23

## 2024-01-24 ENCOUNTER — TELEPHONE (OUTPATIENT)
Age: 64
End: 2024-01-24

## 2024-01-24 DIAGNOSIS — M79.671 PAIN OF RIGHT HEEL: Primary | ICD-10-CM

## 2024-01-24 NOTE — TELEPHONE ENCOUNTER
Patient called in would like to schedule a nurse visit to get his tdap vaccine. Please advise.

## 2024-01-25 ENCOUNTER — HOSPITAL ENCOUNTER (OUTPATIENT)
Dept: ULTRASOUND IMAGING | Facility: HOSPITAL | Age: 64
Discharge: HOME | End: 2024-01-25
Payer: MEDICARE

## 2024-01-25 DIAGNOSIS — F10.21 HISTORY OF ALCOHOL DEPENDENCE (HCC): ICD-10-CM

## 2024-01-25 DIAGNOSIS — K76.0 FATTY LIVER: ICD-10-CM

## 2024-01-25 PROCEDURE — 76981 USE PARENCHYMA: CPT

## 2024-01-26 ENCOUNTER — CLINICAL SUPPORT (OUTPATIENT)
Dept: FAMILY MEDICINE CLINIC | Facility: CLINIC | Age: 64
End: 2024-01-26
Payer: MEDICARE

## 2024-01-26 DIAGNOSIS — Z23 ENCOUNTER FOR IMMUNIZATION: Primary | ICD-10-CM

## 2024-01-26 PROCEDURE — 90715 TDAP VACCINE 7 YRS/> IM: CPT

## 2024-01-26 PROCEDURE — 90471 IMMUNIZATION ADMIN: CPT

## 2024-01-31 LAB — COLOGUARD RESULT REPORTABLE: NEGATIVE

## 2024-02-01 ENCOUNTER — TELEPHONE (OUTPATIENT)
Dept: FAMILY MEDICINE CLINIC | Facility: CLINIC | Age: 64
End: 2024-02-01

## 2024-02-01 NOTE — TELEPHONE ENCOUNTER
----- Message from Levon Parham MD sent at 2/1/2024 10:07 AM EST -----  This test is negative repeat in 3 years

## 2024-02-19 ENCOUNTER — OFFICE VISIT (OUTPATIENT)
Dept: PODIATRY | Facility: CLINIC | Age: 64
End: 2024-02-19
Payer: MEDICARE

## 2024-02-19 VITALS
SYSTOLIC BLOOD PRESSURE: 150 MMHG | BODY MASS INDEX: 35.25 KG/M2 | DIASTOLIC BLOOD PRESSURE: 88 MMHG | WEIGHT: 238 LBS | HEART RATE: 67 BPM | OXYGEN SATURATION: 97 % | HEIGHT: 69 IN

## 2024-02-19 DIAGNOSIS — M79.671 PAIN OF RIGHT HEEL: ICD-10-CM

## 2024-02-19 DIAGNOSIS — M77.51 BURSITIS OF POSTERIOR HEEL, RIGHT: Primary | ICD-10-CM

## 2024-02-19 DIAGNOSIS — M76.61 ACHILLES TENDINITIS, RIGHT LEG: ICD-10-CM

## 2024-02-19 PROCEDURE — 99243 OFF/OP CNSLTJ NEW/EST LOW 30: CPT | Performed by: PODIATRIST

## 2024-02-19 NOTE — PROGRESS NOTES
Assessment/Plan:     The patient's clinical examination today significant for mild tenderness palpation to the posterior aspect of the right heel at the insertion site of the Achilles as well as the area of the retrocalcaneal bursa.  There is tenderness with lateral squeeze of the retrocalcaneal bursa.  There is very mild tenderness of the Achilles tendon along its critical zone.  There are no palpable defects of the right Achilles tendon.  There is no erythema nor edema no calor or ecchymosis.    Recent x-rays of the patient's right heel were personally reviewed and interpreted.  Findings were significant for plantar and retrocalcaneal spurs of the right calcaneus.  Ossicles are noted along the plantar fascia plantarly, however clinically this area is asymptomatic.    The patient's symptomatology is most consistent with insertional Achilles tendinitis and retrocalcaneal bursitis.  Symptoms used to be getting better on their own.  He rates his pain today as a 1 out of 10 on the pain scale.  He defers referral to skilled physical therapy.  We will start him on a home exercise program.  Some simple exercises were included in his AVS today.  He was also fitted with gel heel cups to assist in offloading of the heels and to reduce tension along the Achilles tendons.    As the patient seems to be doing well clinically, he can follow-up with me on as-needed basis.     Diagnoses and all orders for this visit:    Bursitis of posterior heel, right  -     Heel Cup    Pain of right heel  -     Ambulatory Referral to Podiatry  -     Heel Cup    Achilles tendinitis, right leg  -     Heel Cup          Subjective:     Patient ID: Jay M Goldberg is a 63 y.o. male.    The patient presents today for his initial consultation with [facility] podiatry group with a chief complaint of posterior right heel pain that has been present for about 3 months.  The patient notes that the pain has steadily gotten better and today, his pain is  relatively mild.  He rates his pain at about a 1 out of 10 on the pain scale today.  He cannot recall any recent injury or trauma to his right foot.  He typically takes acetaminophen on as-needed basis for pain.      PAST MEDICAL HISTORY:  Past Medical History:   Diagnosis Date    Enlarged prostate     Daniels catheter in place     Hyperlipidemia     Sleep apnea     does not have a cpap machine    Tinnitus     Unable to empty bladder     Wears reading eyeglasses        PAST SURGICAL HISTORY:  Past Surgical History:   Procedure Laterality Date    APPENDECTOMY      CLAVICLE FRACTURE REPAIR      CYST REMOVAL      HERNIA REPAIR      MN BX PROSTATE STRTCTC SATURATION SAMPLING IMG GID N/A 11/01/2022    Procedure: TRANSPERINEALMRI FUSION BIOPSY PROSTATE;  Surgeon: Heber Espinoza MD;  [facility];  Service: Urology    MN LASER ENUCLEATION PROSTATE W/MORCELLATION N/A 2/14/2023    Procedure: LASER ENUCLEATION PROSTATE W MORCELLATION;  Surgeon: Chad Andersen MD;  [facility];  Service: Urology    TONSILLECTOMY      TRANSURETHRAL RESECTION OF PROSTATE N/A 2/14/2023    Procedure: TRANSURETHRAL RESECTION OF PROSTATE (TURP);  Surgeon: Chad Andersen MD;  [facility];  Service: Urology    UVULECTOMY          ALLERGIES:  Other and Keflex [cephalexin]    MEDICATIONS:  Current Outpatient Medications   Medication Sig Dispense Refill    ketoconazole (NIZORAL) 2 % shampoo Apply 1 Application topically 2 (two) times a week 100 mL 0    sildenafil (VIAGRA) 100 mg tablet Take 1 tablet (100 mg total) by mouth daily as needed for erectile dysfunction 30 tablet 3    tadalafil (CIALIS) 20 MG tablet Take 1 tablet (20 mg total) by mouth daily as needed for erectile dysfunction 30 tablet 3     No current facility-administered medications for this visit.       SOCIAL HISTORY:  Social History     Socioeconomic History    Marital status: Single     Spouse name: None    Number of children: None    Years of education: None     Highest education level: None   Occupational History    None   Tobacco Use    Smoking status: Former     Current packs/day: 0.00     Types: Cigarettes     Quit date: 2012     Years since quittin.1    Smokeless tobacco: Never   Vaping Use    Vaping status: Every Day    Substances: Nicotine, Flavoring   Substance and Sexual Activity    Alcohol use: Yes     Comment: rarely    Drug use: Yes     Types: Marijuana     Comment: rarely - once every 5-6 months    Sexual activity: Not Currently   Other Topics Concern    None   Social History Narrative    None     Social Determinants of Health     Financial Resource Strain: Not on file   Food Insecurity: No Food Insecurity (2022)    Hunger Vital Sign     Worried About Running Out of Food in the Last Year: Never true     Ran Out of Food in the Last Year: Never true   Transportation Needs: No Transportation Needs (2022)    PRAPARE - Transportation     Lack of Transportation (Medical): No     Lack of Transportation (Non-Medical): No   Physical Activity: Not on file   Stress: Not on file   Social Connections: Not on file   Intimate Partner Violence: Not on file   Housing Stability: Unknown (2022)    Housing Stability Vital Sign     Unable to Pay for Housing in the Last Year: No     Number of Places Lived in the Last Year: Not on file     Unstable Housing in the Last Year: No        Review of Systems   Constitutional: Negative.    HENT: Negative.     Eyes: Negative.    Respiratory: Negative.     Cardiovascular: Negative.    Endocrine: Negative.    Musculoskeletal: Negative.    Neurological: Negative.    Hematological: Negative.    Psychiatric/Behavioral: Negative.           Objective:     Physical Exam  Vitals reviewed.   Constitutional:       Appearance: Normal appearance.   HENT:      Head: Normocephalic and atraumatic.      Nose: Nose normal.   Eyes:      Conjunctiva/sclera: Conjunctivae normal.      Pupils: Pupils are equal, round, and reactive to light.    Cardiovascular:      Pulses:           Dorsalis pedis pulses are 2+ on the right side.        Posterior tibial pulses are 2+ on the right side.   Pulmonary:      Effort: Pulmonary effort is normal.   Musculoskeletal:        Feet:    Feet:      Right foot:      Skin integrity: Skin integrity normal.      Comments: The patient's clinical examination today significant for mild tenderness palpation to the posterior aspect of the right heel at the insertion site of the Achilles as well as the area of the retrocalcaneal bursa.  There is tenderness with lateral squeeze of the retrocalcaneal bursa.  There is very mild tenderness of the Achilles tendon along its critical zone.  There are no palpable defects of the right Achilles tendon.  There is no erythema nor edema no calor or ecchymosis.  Skin:     General: Skin is warm.      Capillary Refill: Capillary refill takes less than 2 seconds.   Neurological:      General: No focal deficit present.      Mental Status: He is alert and oriented to person, place, and time.   Psychiatric:         Mood and Affect: Mood normal.         Behavior: Behavior normal.         Thought Content: Thought content normal.

## 2024-02-19 NOTE — LETTER
February 22, 2024     Levon Parham MD  [address] [address]    Patient: Jay M Goldberg   YOB: 1960   Date of Visit: 2/19/2024       Dear Dr. Parham:    Thank you for referring Jay Goldberg to me for evaluation. Below are my notes for this consultation.    If you have questions, please do not hesitate to call me. I look forward to following your patient along with you.         Sincerely,        Vitaliy Rojas DPM        CC: No Recipients    Vitaliy Rojas DPM  2/19/2024  4:00 PM  Signed  Assessment/Plan:     The patient's clinical examination today significant for mild tenderness palpation to the posterior aspect of the right heel at the insertion site of the Achilles as well as the area of the retrocalcaneal bursa.  There is tenderness with lateral squeeze of the retrocalcaneal bursa.  There is very mild tenderness of the Achilles tendon along its critical zone.  There are no palpable defects of the right Achilles tendon.  There is no erythema nor edema no calor or ecchymosis.    Recent x-rays of the patient's right heel were personally reviewed and interpreted.  Findings were significant for plantar and retrocalcaneal spurs of the right calcaneus.  Ossicles are noted along the plantar fascia plantarly, however clinically this area is asymptomatic.    The patient's symptomatology is most consistent with insertional Achilles tendinitis and retrocalcaneal bursitis.  Symptoms used to be getting better on their own.  He rates his pain today as a 1 out of 10 on the pain scale.  He defers referral to skilled physical therapy.  We will start him on a home exercise program.  Some simple exercises were included in his AVS today.  He was also fitted with gel heel cups to assist in offloading of the heels and to reduce tension along the Achilles tendons.    As the patient seems to be doing well clinically, he can follow-up with me on as-needed basis.     Diagnoses and all  orders for this visit:    Bursitis of posterior heel, right  -     Heel Cup    Pain of right heel  -     Ambulatory Referral to Podiatry  -     Heel Cup    Achilles tendinitis, right leg  -     Heel Cup          Subjective:     Patient ID: Jay M Goldberg is a 63 y.o. male.    The patient presents today for his initial consultation with [facility] podiatry group with a chief complaint of posterior right heel pain that has been present for about 3 months.  The patient notes that the pain has steadily gotten better and today, his pain is relatively mild.  He rates his pain at about a 1 out of 10 on the pain scale today.  He cannot recall any recent injury or trauma to his right foot.  He typically takes acetaminophen on as-needed basis for pain.      PAST MEDICAL HISTORY:  Past Medical History:   Diagnosis Date   • Enlarged prostate    • Daniels catheter in place    • Hyperlipidemia    • Sleep apnea     does not have a cpap machine   • Tinnitus    • Unable to empty bladder    • Wears reading eyeglasses        PAST SURGICAL HISTORY:  Past Surgical History:   Procedure Laterality Date   • APPENDECTOMY     • CLAVICLE FRACTURE REPAIR     • CYST REMOVAL     • HERNIA REPAIR     • NE BX PROSTATE STRTCTC SATURATION SAMPLING IMG GID N/A 11/01/2022    Procedure: TRANSPERINEALMRI FUSION BIOPSY PROSTATE;  Surgeon: Heber Espinoza MD;  [facility];  Service: Urology   • NE LASER ENUCLEATION PROSTATE W/MORCELLATION N/A 2/14/2023    Procedure: LASER ENUCLEATION PROSTATE W MORCELLATION;  Surgeon: Chad Andersen MD;  [facility];  Service: Urology   • TONSILLECTOMY     • TRANSURETHRAL RESECTION OF PROSTATE N/A 2/14/2023    Procedure: TRANSURETHRAL RESECTION OF PROSTATE (TURP);  Surgeon: Chad Andersen MD;  [facility];  Service: Urology   • UVULECTOMY          ALLERGIES:  Other and Keflex [cephalexin]    MEDICATIONS:  Current Outpatient Medications   Medication Sig Dispense Refill   • ketoconazole (NIZORAL)  2 % shampoo Apply 1 Application topically 2 (two) times a week 100 mL 0   • sildenafil (VIAGRA) 100 mg tablet Take 1 tablet (100 mg total) by mouth daily as needed for erectile dysfunction 30 tablet 3   • tadalafil (CIALIS) 20 MG tablet Take 1 tablet (20 mg total) by mouth daily as needed for erectile dysfunction 30 tablet 3     No current facility-administered medications for this visit.       SOCIAL HISTORY:  Social History     Socioeconomic History   • Marital status: Single     Spouse name: None   • Number of children: None   • Years of education: None   • Highest education level: None   Occupational History   • None   Tobacco Use   • Smoking status: Former     Current packs/day: 0.00     Types: Cigarettes     Quit date:      Years since quittin.   • Smokeless tobacco: Never   Vaping Use   • Vaping status: Every Day   • Substances: Nicotine, Flavoring   Substance and Sexual Activity   • Alcohol use: Yes     Comment: rarely   • Drug use: Yes     Types: Marijuana     Comment: rarely - once every 5-6 months   • Sexual activity: Not Currently   Other Topics Concern   • None   Social History Narrative   • None     Social Determinants of Health     Financial Resource Strain: Not on file   Food Insecurity: No Food Insecurity (2022)    Hunger Vital Sign    • Worried About Running Out of Food in the Last Year: Never true    • Ran Out of Food in the Last Year: Never true   Transportation Needs: No Transportation Needs (2022)    PRAPARE - Transportation    • Lack of Transportation (Medical): No    • Lack of Transportation (Non-Medical): No   Physical Activity: Not on file   Stress: Not on file   Social Connections: Not on file   Intimate Partner Violence: Not on file   Housing Stability: Unknown (2022)    Housing Stability Vital Sign    • Unable to Pay for Housing in the Last Year: No    • Number of Places Lived in the Last Year: Not on file    • Unstable Housing in the Last Year: No        Review  of Systems   Constitutional: Negative.    HENT: Negative.     Eyes: Negative.    Respiratory: Negative.     Cardiovascular: Negative.    Endocrine: Negative.    Musculoskeletal: Negative.    Neurological: Negative.    Hematological: Negative.    Psychiatric/Behavioral: Negative.           Objective:     Physical Exam  Vitals reviewed.   Constitutional:       Appearance: Normal appearance.   HENT:      Head: Normocephalic and atraumatic.      Nose: Nose normal.   Eyes:      Conjunctiva/sclera: Conjunctivae normal.      Pupils: Pupils are equal, round, and reactive to light.   Cardiovascular:      Pulses:           Dorsalis pedis pulses are 2+ on the right side.        Posterior tibial pulses are 2+ on the right side.   Pulmonary:      Effort: Pulmonary effort is normal.   Musculoskeletal:        Feet:    Feet:      Right foot:      Skin integrity: Skin integrity normal.      Comments: The patient's clinical examination today significant for mild tenderness palpation to the posterior aspect of the right heel at the insertion site of the Achilles as well as the area of the retrocalcaneal bursa.  There is tenderness with lateral squeeze of the retrocalcaneal bursa.  There is very mild tenderness of the Achilles tendon along its critical zone.  There are no palpable defects of the right Achilles tendon.  There is no erythema nor edema no calor or ecchymosis.  Skin:     General: Skin is warm.      Capillary Refill: Capillary refill takes less than 2 seconds.   Neurological:      General: No focal deficit present.      Mental Status: He is alert and oriented to person, place, and time.   Psychiatric:         Mood and Affect: Mood normal.         Behavior: Behavior normal.         Thought Content: Thought content normal.

## 2024-03-12 DIAGNOSIS — N52.8 OTHER MALE ERECTILE DYSFUNCTION: ICD-10-CM

## 2024-03-12 DIAGNOSIS — Z79.899 OTHER LONG TERM (CURRENT) DRUG THERAPY: ICD-10-CM

## 2024-03-12 RX ORDER — SILDENAFIL 100 MG/1
100 TABLET, FILM COATED ORAL DAILY PRN
Qty: 30 TABLET | Refills: 5 | Status: SHIPPED | OUTPATIENT
Start: 2024-03-12

## 2024-03-12 RX ORDER — TADALAFIL 20 MG/1
20 TABLET ORAL DAILY PRN
Qty: 90 TABLET | Refills: 1 | Status: SHIPPED | OUTPATIENT
Start: 2024-03-12

## 2024-03-13 RX ORDER — KETOCONAZOLE 20 MG/ML
1 SHAMPOO TOPICAL 2 TIMES WEEKLY
Qty: 100 ML | Refills: 0 | Status: SHIPPED | OUTPATIENT
Start: 2024-03-14

## 2024-06-11 ENCOUNTER — TELEPHONE (OUTPATIENT)
Dept: FAMILY MEDICINE CLINIC | Facility: CLINIC | Age: 64
End: 2024-06-11

## 2025-03-13 NOTE — TELEPHONE ENCOUNTER
I called the pt to talk to her about antibiotics  He has started [employer]  Given his significant ESBL infection I think it makes more sense for him to pause further use now and restart a couple days before surgery (although even this will likely minimal effect overall on risk of infection with the procedure)  We will give him IV antibiotics during surgery and after  He otherwise appears ready for surgery based on cardiology clearance and other blood work that has been done 
1-2 drinks

## 2025-05-07 NOTE — TELEPHONE ENCOUNTER
Post Op Note    Carrol Razo is a 58 y o  male s/p HoLEP performed 2/14/23  Carrol Razo is a patient of Dr Luis Alberto Blanc and is seen at the [facility] office  How would you rate your pain on a scale from 1 to 10, 10 being the worst pain ever? Patient states he is sore, tita near R hip area  Have you had a fever? No  Have your bowel movements been regular? No  If the patient has a cox- are you comfortable caring for your cox? Yes Is it draining urine? Yes  Do you have any other questions or concerns that I can address at this time? Patient is aware of his void trial on 2/21/23 @ [address] and [address]  Informed him he will need to schedule renal US appt with central scheduling prior to his f/u appt here on 3/22/23    Patient states he will get this info from us on 2/21/23
The patient had a difficult HoLEP surgery  Left ureteral orifice was not able to be seen at the end of case  Ultrasound the following day was unremarkable as was kidney function labs and pt also denied flank pain  Plan for catheter removal next week    I would like him to follow-up with a repeat ultrasound to be safe in approximately 3-4 weeks from now 
Void trial scheduled on 2/21/23 and then f/u visit was rescheduled to 3/22/23 to review renal US  Will discuss with patient once discharged 
denies pain/discomfort (Rating = 0)

## 2025-07-16 DIAGNOSIS — N52.8 OTHER MALE ERECTILE DYSFUNCTION: ICD-10-CM

## 2025-07-17 RX ORDER — TADALAFIL 20 MG/1
20 TABLET ORAL DAILY PRN
Qty: 60 TABLET | Refills: 0 | Status: SHIPPED | OUTPATIENT
Start: 2025-07-17

## (undated) DEVICE — PREMIUM DRY TRAY LF: Brand: MEDLINE INDUSTRIES, INC.

## (undated) DEVICE — BAG URINE DRAINAGE 4000ML CONTINUOUS IRR

## (undated) DEVICE — CYSTO TUBING SINGLE IRRIGATION

## (undated) DEVICE — CATH URETERAL 5FR X 70 CM FLEX TIP POLYUR BARD

## (undated) DEVICE — Device

## (undated) DEVICE — EVACUATOR BLADDER ELLIK DISP STRL

## (undated) DEVICE — CHLORHEXIDINE 4PCT 4 OZ

## (undated) DEVICE — URO CATCHER BAG NON STERILE 0-UC33

## (undated) DEVICE — MORCELLATOR CYBERBLADE SNGL USE

## (undated) DEVICE — CYSTO TUBING TUR Y IRRIGATION

## (undated) DEVICE — HF-RESECTION ELECTRODE PLASMALOOP LOOP, MEDIUM, 24 FR., 12°-30°, PK TURIS: Brand: OLYMPUS

## (undated) DEVICE — FIBER STD QUANTA 550 MICRON

## (undated) DEVICE — 4-PORT MANIFOLD: Brand: NEPTUNE 2

## (undated) DEVICE — TUBING SUCTION 5MM X 12 FT

## (undated) DEVICE — BASIC SINGLE BASIN 2-LF: Brand: MEDLINE INDUSTRIES, INC.

## (undated) DEVICE — LUBRICANT SURGILUBE TUBE 4 OZ  FLIP TOP

## (undated) DEVICE — PACK TUR

## (undated) DEVICE — CATH DIAG SLIP 5FR KMP

## (undated) DEVICE — GUIDEWIRE STRGHT TIP 0.035 IN  SOLO PLUS

## (undated) DEVICE — SYSTEM TRANSPERINEAL ACCESS PRECISIONPOINT

## (undated) DEVICE — GLOVE SRG BIOGEL ECLIPSE 7.5